# Patient Record
Sex: FEMALE | Race: WHITE | Employment: OTHER | URBAN - METROPOLITAN AREA
[De-identification: names, ages, dates, MRNs, and addresses within clinical notes are randomized per-mention and may not be internally consistent; named-entity substitution may affect disease eponyms.]

---

## 2017-01-05 PROBLEM — I10 ESSENTIAL HYPERTENSION: Status: ACTIVE | Noted: 2017-01-05

## 2017-06-19 ENCOUNTER — HOSPITAL ENCOUNTER (OUTPATIENT)
Dept: MAMMOGRAPHY | Age: 67
Discharge: HOME | End: 2017-06-19
Attending: FAMILY MEDICINE
Payer: MEDICARE

## 2017-06-19 DIAGNOSIS — Z12.31 VISIT FOR SCREENING MAMMOGRAM: ICD-10-CM

## 2017-06-19 PROCEDURE — 77067 SCR MAMMO BI INCL CAD: CPT

## 2017-07-25 PROBLEM — R05.9 COUGH: Status: ACTIVE | Noted: 2017-07-25

## 2017-08-03 ENCOUNTER — HOSPITAL ENCOUNTER (OUTPATIENT)
Dept: CT IMAGING | Age: 67
Discharge: HOME | End: 2017-08-03
Attending: INTERNAL MEDICINE
Payer: MEDICARE

## 2017-08-03 DIAGNOSIS — J84.9 ILD (INTERSTITIAL LUNG DISEASE) (HCC): ICD-10-CM

## 2017-08-03 PROCEDURE — 71250 CT THORAX DX C-: CPT

## 2017-08-08 NOTE — PROGRESS NOTES
Spoke with the patient in regards to their CT Chest wo Cont results. Explained to the patient that per Dr. Felix Mccartney the scan shows signs of ILD and for our next steps he would like to set the patient up with CPFT's and blood work done and review the results with the patient once they are received. The patient was fine with this and did not have any further questions or concerns. CPFT will be scheduled and orders for the lab work have been ordered. // Emily BROWN

## 2017-08-15 ENCOUNTER — HOSPITAL ENCOUNTER (OUTPATIENT)
Dept: LAB | Age: 67
Discharge: HOME | End: 2017-08-15
Payer: MEDICARE

## 2017-08-15 DIAGNOSIS — J84.9 ILD (INTERSTITIAL LUNG DISEASE) (HCC): ICD-10-CM

## 2017-08-15 LAB
CRP SERPL-MCNC: <0.3 MG/DL (ref 0–0.9)
ERYTHROCYTE [SEDIMENTATION RATE] IN BLOOD: 17 MM/HR (ref 0–30)

## 2017-08-15 PROCEDURE — 86200 CCP ANTIBODY: CPT | Performed by: INTERNAL MEDICINE

## 2017-08-15 PROCEDURE — 86430 RHEUMATOID FACTOR TEST QUAL: CPT | Performed by: INTERNAL MEDICINE

## 2017-08-15 PROCEDURE — 85652 RBC SED RATE AUTOMATED: CPT | Performed by: INTERNAL MEDICINE

## 2017-08-15 PROCEDURE — 86038 ANTINUCLEAR ANTIBODIES: CPT | Performed by: INTERNAL MEDICINE

## 2017-08-15 PROCEDURE — 36415 COLL VENOUS BLD VENIPUNCTURE: CPT | Performed by: INTERNAL MEDICINE

## 2017-08-15 PROCEDURE — 86431 RHEUMATOID FACTOR QUANT: CPT | Performed by: INTERNAL MEDICINE

## 2017-08-15 PROCEDURE — 86140 C-REACTIVE PROTEIN: CPT | Performed by: INTERNAL MEDICINE

## 2017-08-15 PROCEDURE — 86225 DNA ANTIBODY NATIVE: CPT | Performed by: INTERNAL MEDICINE

## 2017-08-16 LAB
ANA SER QL: POSITIVE
CCP IGA+IGG SERPL IA-ACNC: 5 UNITS (ref 0–19)
CENTROMERE B AB SER-ACNC: 1.5 AI (ref 0–0.9)
CHROMATIN AB SERPL-ACNC: 2 AI (ref 0–0.9)
DSDNA AB SER-ACNC: 14 IU/ML (ref 0–9)
ENA JO1 AB SER-ACNC: <0.2 AI (ref 0–0.9)
ENA RNP AB SER-ACNC: 0.2 AI (ref 0–0.9)
ENA SCL70 AB SER-ACNC: 0.8 AI (ref 0–0.9)
ENA SM AB SER-ACNC: >8 AI (ref 0–0.9)
ENA SS-A AB SER-ACNC: <0.2 AI (ref 0–0.9)
ENA SS-B AB SER-ACNC: <0.2 AI (ref 0–0.9)
RHEUMATOID FACT SER QL LA: POSITIVE
RHEUMATOID FACT TITR SER LA: NORMAL {TITER}
SEE BELOW, 164869: ABNORMAL

## 2017-08-17 NOTE — PROGRESS NOTES
Patient has been notified of their blood work results. I explained to the patient that per Dr. Abad Orta the results showed suggestive connective tissue disease such as lupus and he would like to refer to the patient to Rheumatology to see if they concur. The patient was fine with this and did not have any further questions or concerns at this time. Referral to Rheumatology has been ordered. // Brandt BROWN

## 2017-09-18 PROBLEM — M32.9 SLE (SYSTEMIC LUPUS ERYTHEMATOSUS) (HCC): Chronic | Status: ACTIVE | Noted: 2017-09-18

## 2017-09-18 PROBLEM — M35.9 INTERSTITIAL LUNG DISEASE DUE TO CONNECTIVE TISSUE DISEASE (HCC): Chronic | Status: ACTIVE | Noted: 2017-09-18

## 2017-09-18 PROBLEM — J84.89 INTERSTITIAL LUNG DISEASE DUE TO CONNECTIVE TISSUE DISEASE (HCC): Chronic | Status: ACTIVE | Noted: 2017-09-18

## 2018-01-10 PROBLEM — M32.8 OTHER FORMS OF SYSTEMIC LUPUS ERYTHEMATOSUS (HCC): Status: ACTIVE | Noted: 2017-09-18

## 2018-01-23 ENCOUNTER — PATIENT OUTREACH (OUTPATIENT)
Dept: CASE MANAGEMENT | Age: 68
End: 2018-01-23

## 2018-01-23 NOTE — PROGRESS NOTES
Patient categorized as high risk patient, level 3/4. Chart reviewed to determine need for CCM services. Patient has had no admissions or ED visits in 3 years. Patient sees several specialists and has established care with Dr. Vidal Gracia office. Patient is self-directed in care as she is in communication with care team outside of office visits. Patient's chronic conditions are managed. At this time there is no evidence to suggest that CCM services are needed. This note will not be viewable in [address]

## 2018-01-25 PROBLEM — H10.30 ACUTE BACTERIAL CONJUNCTIVITIS: Status: ACTIVE | Noted: 2018-01-25

## 2018-01-25 PROBLEM — J02.0 STREP PHARYNGITIS: Status: ACTIVE | Noted: 2018-01-25

## 2018-01-25 PROBLEM — J06.9 ACUTE URI: Status: ACTIVE | Noted: 2018-01-25

## 2018-03-09 PROBLEM — I10 ESSENTIAL HYPERTENSION: Chronic | Status: ACTIVE | Noted: 2017-01-05

## 2018-03-09 PROBLEM — R05.9 COUGH: Status: RESOLVED | Noted: 2017-07-25 | Resolved: 2018-03-09

## 2018-03-09 PROBLEM — M32.8 OTHER FORMS OF SYSTEMIC LUPUS ERYTHEMATOSUS (HCC): Chronic | Status: ACTIVE | Noted: 2017-09-18

## 2018-04-18 PROBLEM — J06.9 ACUTE URI: Status: RESOLVED | Noted: 2018-01-25 | Resolved: 2018-04-18

## 2018-04-18 PROBLEM — H10.30 ACUTE BACTERIAL CONJUNCTIVITIS: Status: RESOLVED | Noted: 2018-01-25 | Resolved: 2018-04-18

## 2018-04-18 PROBLEM — J02.0 STREP PHARYNGITIS: Status: RESOLVED | Noted: 2018-01-25 | Resolved: 2018-04-18

## 2018-04-20 PROBLEM — N95.1 MENOPAUSE SYNDROME: Status: ACTIVE | Noted: 2018-04-20

## 2018-05-07 PROBLEM — I73.00 RAYNAUD'S PHENOMENON WITHOUT GANGRENE: Status: ACTIVE | Noted: 2018-05-07

## 2018-05-07 PROBLEM — R00.2 PALPITATIONS: Status: ACTIVE | Noted: 2018-05-07

## 2018-06-29 ENCOUNTER — HOSPITAL ENCOUNTER (OUTPATIENT)
Dept: MAMMOGRAPHY | Age: 68
Discharge: HOME | End: 2018-06-29
Attending: FAMILY MEDICINE
Payer: MEDICARE

## 2018-06-29 DIAGNOSIS — Z12.31 VISIT FOR SCREENING MAMMOGRAM: ICD-10-CM

## 2018-06-29 PROCEDURE — 77067 SCR MAMMO BI INCL CAD: CPT

## 2018-09-06 ENCOUNTER — HOSPITAL ENCOUNTER (OUTPATIENT)
Dept: GENERAL RADIOLOGY | Age: 68
Discharge: HOME | End: 2018-09-06
Attending: INTERNAL MEDICINE
Payer: MEDICARE

## 2018-09-06 DIAGNOSIS — J84.9 ILD (INTERSTITIAL LUNG DISEASE) (HCC): ICD-10-CM

## 2018-09-06 PROCEDURE — 71046 X-RAY EXAM CHEST 2 VIEWS: CPT

## 2018-12-19 PROBLEM — E78.00 HYPERCHOLESTEROLEMIA: Status: ACTIVE | Noted: 2018-12-19

## 2019-02-21 PROBLEM — R05.9 COUGH: Status: ACTIVE | Noted: 2019-02-21

## 2019-03-22 PROBLEM — J06.9 ACUTE URI: Status: RESOLVED | Noted: 2018-01-25 | Resolved: 2019-03-22

## 2019-07-03 ENCOUNTER — HOSPITAL ENCOUNTER (OUTPATIENT)
Dept: MAMMOGRAPHY | Age: 69
Discharge: HOME | End: 2019-07-03
Attending: FAMILY MEDICINE
Payer: MEDICARE

## 2019-07-03 DIAGNOSIS — Z12.31 VISIT FOR SCREENING MAMMOGRAM: ICD-10-CM

## 2019-07-03 PROCEDURE — 77067 SCR MAMMO BI INCL CAD: CPT

## 2019-10-31 ENCOUNTER — HOSPITAL ENCOUNTER (OUTPATIENT)
Dept: PHYSICAL THERAPY | Age: 69
Discharge: HOME | End: 2019-10-31
Payer: MEDICARE

## 2019-10-31 PROCEDURE — 97110 THERAPEUTIC EXERCISES: CPT

## 2019-10-31 PROCEDURE — 97162 PT EVAL MOD COMPLEX 30 MIN: CPT

## 2019-10-31 NOTE — PROGRESS NOTES
Omid Gross  : 1950  [place] / [place] / Product Type: Medicare /  [address] at [facility] [place]  [address] [place] [address], [address], [address] [facility]  [phone]   [phone]       OUTPATIENT PHYSICAL THERAPY: Daily Treatment Note 10/31/2019  Visit Count: 1     ICD-10: Treatment Diagnosis: Pain in right hip (M25.551); Stiffness of right hip, not elsewhere classified (M25.651); Sciatica, right side (M54.31)  Precautions/Allergies: From EMR: Sulfa (sulfonamide antibiotics) and Penicillins   TREATMENT PLAN:  Effective Dates: 10/31/2019 TO 2019 (60 days). Frequency/Duration: 1-2 time a week for 60 Day(s) MEDICAL/REFERRING DIAGNOSIS:Sciatica, right side [M54.31]   DATE OF ONSET: 1 1/2 years ago  REFERRING PHYSICIAN: Santino Paz MD MD Orders: Willy Radford and Treat  Return MD Appointment: 2020       Pre-treatment Symptoms/Complaints:  See evaluation note from today. Pain: Initial:    0/10 now, 9/10 at worst Post Session:  0/10   Medications Last Reviewed:  10/31/19    Updated Objective Findings:   See evaluation note from today     TREATMENT:     Therapeutic Exercise: (15 Mintues): Instruction and performance in lumbopelvic and hip mobility exercises, including lumbar extension in standing; pelvic tilts, hip pull back/pelvic shifts, lower trunk rotation, and bent knee fall outs in hook-lying; active hamstrings stretch in supine 90/90, and quadruped respiratory belly lift. Good return demonstration with initial training. HEP: Provided written HEP for the above exercises. She can use heat as needed. Advised to continue to modify activity for symptom management. She verbalizes understanding. [employer] Portal [id number]    Treatment/Session Summary:    · Response to Treatment:  Good start to joint mobility exercises with out immediate symptom increase. Symptoms with low irritability noted today an no significant pain reported with thesee exercises. Receptive to the HEP provided today. · Communication/Consultation:  None today  · Equipment provided today:  None today  · Recommendations/Intent for next treatment session: Continue with manual treatment for lumbar and hip joint stiffness; address flexibility and lower quarter muscle balance issues; review HEP and update as appropriate; and use modalities as needed for pain modulation.      Total Treatment Billable Duration: 15 mintes  PT Patient Time In/Time Out  Time In: 0900  Time Out: 1200 [facility], PT    Future Appointments   Date Time Provider Ron Thurman   11/7/2019  8:15 AM Yennifer Sen, PT [facility]   11/14/2019  9:00 AM Gatito Sessions, PT [facility]   11/21/2019 10:30 AM [facility]   11/27/2019  9:45 AM Gatito Sessions, [facility]   12/17/2019 10:00 AM Margie Shone, MD [facility]

## 2019-10-31 NOTE — THERAPY EVALUATION
Easton Hernandez  : 1950  [place]  Secondary: [employer] Garcia Senior Medicare [address] at [facility] [place]  [address] [place] [address], [address], [place] [address].  [phone]   [phone]       OUTPATIENT PHYSICAL THERAPY:Initial Assessment 10/31/2019     ICD-10: Treatment Diagnosis: Pain in right hip (M25.551); Stiffness of right hip, not elsewhere classified (M25.651); Sciatica, right side (M54.31)  Precautions/Allergies: From EMR: Sulfa (sulfonamide antibiotics) and Penicillins   TREATMENT PLAN:  Effective Dates: 10/31/2019 TO 2019 (60 days). Frequency/Duration: 1-2 time a week for 60 Day(s) MEDICAL/REFERRING DIAGNOSIS:Sciatica, right side [M54.31]   DATE OF ONSET: 1 1/2 years ago  REFERRING PHYSICIAN: Natalia Esquivel MD MD Orders: Alba Armijo and Treat  Return MD Appointment: 2020     INITIAL ASSESSMENT:  Ms. Treva Moralez presents the complaint of R lumbosacral, hip, and LE pain for the past 1 1/2 years that appears to be worsening recently. She has a history of s/p L4-5 laminectomy in  with good resolve of symptoms until this pain episode onset. Lumbar AROM is in a capsular pattern with mild joint sign on extension and R side bend. Straight Leg Raise, Slump, and lumbar quadrant tests are negative for symptom reproduction. The R hip presents with positive joint sign. FABERE, FADIR, Kee and Korey tests are all positive on the R. And there is decreased rotation ROM, especially IR. The hip may be contributing to her pain problem as well. The current impairments of pain, stiffness and muscle balance issues to the lumbopelvic and hip region on the R are limiting her comfort and ability to do basic mobility and her normal activities. She will benefit from PT to address these impairments. PROBLEM LIST (Impacting functional limitations):  1. Pain R lumbosacral spine, hip, LE  2. Stiffness lumbar spine, R hip  3.  Functional weakness LQ INTERVENTIONS PLANNED: (Treatment may consist of any combination of the following)  1. Manual Therapies, thermal and electric modalities for pain  2. Manual Therapies, thermal and electric modalities, therapeutic exercise for stiffness  3. Therapeutic Exercise, Therapeutic Activity for weakness and back care functional mobility training     GOALS: (Goals have been discussed and agreed upon with patient.)  Discharge Goals: Time Frame: 8 weeks  1. Report no more than 4/10 intermittent pain with normal activities, bending, lifting, and housework. 2. R hip PROM is grossly equal L and Kee test negative on R for improved mobility comfort. 3. Score at least 55/80 on the LEFS and less than 25% on the Modified Oswestry for improved perceived ability to perform activities. 4. Independent with back care body mechanics with basic ADL's, and a back and hip HEP for continued self-management. OUTCOME MEASURE:   Tool Used: Lower Extremity Functional Scale (LEFS)  Score:  Initial: 39/80 Most Recent: X/80 (Date: -- )   Interpretation of Score: 20 questions each scored on a 5 point scale with 0 representing \"extreme difficulty or unable to perform\" and 4 representing \"no difficulty\". The lower the score, the greater the functional disability. 80/80 represents no disability. Minimal detectable change is 9 points. Tool Used: Modified Oswestry Low Back Pain Questionnaire  Score:  Initial: 20/50 or 40% disabilty Most Recent: X/50 (Date: -- )   Interpretation of Score: Each section is scored on a 0-5 scale, 5 representing the greatest disability. The scores of each section are added together for a total score of 50. MEDICAL NECESSITY:   · Current pain epsidode limiting basic mobilty, normal activities. · Pain, stiffness, weakness to R lumbopelvic and hip region  REASON FOR SERVICES/OTHER COMMENTS:  · Current pain episode limiting basic mobilty and normal activity level.   Total Duration:  PT Patient Time In/Time Out  Time In: 0900  Time Out: 0950    Rehabilitation Potential For Stated Goals: Good, but may be limited by extent of the lumbar spine and R hip joints  Regarding Thierry Hopping therapy, I certify that the treatment plan above will be carried out by a therapist or under their direction. Thank you for this referral,    Dick Gonzales, PT, MSPT, OCS     Referring Physician Signature: Cody Mauro MD _______________________________ Date _____________       PAIN/SUBJECTIVE:   Initial:   0/10 now, 9/10 at worst Post Session:  0/10   HISTORY:   History of Injury/Illness (Reason for Referral): Pain starting about 1 1/2 years ago the next day after doing a lot of house cleaning. Pain has been intermittent, but persisted and has been worse more recently. Pain is located to central and R lower back, posterior hip, lateral hip and thigh, and lateral calf. Denies numbness and tingling. Pain is intermittent. It is worse first thing thing in the morning, and with prolonged standing, walking, sitting/driving, and vacuuming. Pressing on R thigh muscles helps relieve pain. Has been treating with chiro treatment every other week now; some stretching/exercise; home traction unit. No diagnostic imagine done this episode. Was prescribed a prednisone dose pack to start 11/5/19. Goal is to walk without pain; no pain in R thigh. Past Medical History/Comorbidities: 11 year history of back pain; s/p L4-5 laminectomy 5/2009. Ms. Jesi Lee  has a past medical history of ADD (attention deficit disorder), Arthritis, Depression, Essential hypertension (1/5/2017), Interstitial lung disease due to connective tissue disease ([facility] [address] Menopause, Mitral valve prolapse, Sciatic nerve disease, right, SLE (systemic lupus erythematosus) ([facility] [address] (9/18/2017), Unspecified adverse effect of anesthesia, and Unspecified sleep apnea. Ms. Jesi Lee  has a past surgical history that includes hx gyn; hx other surgical; hx lumbar laminectomy (2009); and hx colonoscopy (08/20/2013).   Social History/Living Environment: Lives with her  in a single-story home. Prior Level of Function/Work/Activity: Independent with all basic mobility. Pain limits activities. She is retired. Active with household activities. Ambulatory/Rehab Services H2 Model Falls Risk Assessment   Risk Factors:       No Risk Factors Identified Ability to Rise from Chair:       (1)  Pushes up, successful in one attempt   Falls Prevention Plan:       No modifications necessary   Total: (5 or greater = High Risk): 1   ©2010 [facility] [employer]. All Rights Reserved. [facility] [employer] Patent #[id number]. Federal Law prohibits the replication, distribution or use without written permission from [facility] [employer]   Current Medications: From EMR:      Current Outpatient Medications:     hydroxychloroquine (PLAQUENIL) 200 mg tablet, Take 1 pill once a day after food. , Disp: 30 Tab, Rfl: 3    amLODIPine (NORVASC) 5 mg tablet, Take 1 Tab by mouth daily. , Disp: 90 Tab, Rfl: 1    amitriptyline (ELAVIL) 10 mg tablet, Take 1 Tab by mouth daily. , Disp: 90 Tab, Rfl: 3    sertraline (ZOLOFT) 100 mg tablet, Take 1 Tab by mouth daily. , Disp: 90 Tab, Rfl: 1    rosuvastatin (CRESTOR) 10 mg tablet, Take 1 Tab by mouth nightly., Disp: 90 Tab, Rfl: 1    omeprazole (PRILOSEC) 20 mg capsule, Take 1 Cap by mouth two (2) times a day., Disp: 180 Cap, Rfl: 1    dextroamphetamine-amphetamine (ADDERALL) 30 mg tablet, Take 1 Tab by mouth dailyEarliest Fill Date: 2/19/19. Max Daily Amount: 1 Tab, Disp: 30 Tab, Rfl: 0    dextroamphetamine-amphetamine (ADDERALL) 30 mg tablet, Take 1 Tab by mouth dailyEarliest Fill Date: 1/19/19. Max Daily Amount: 1 Tab, Disp: 30 Tab, Rfl: 0    dextroamphetamine-amphetamine (ADDERALL) 30 mg tablet, Take 1 Tab by mouth dailyEarliest Fill Date: 12/19/18.   Max Daily Amount: 1 Tab, Disp: 30 Tab, Rfl: 0    diclofenac EC (VOLTAREN) 75 mg EC tablet, Take 1 Tab by mouth two (2) times a day., Disp: 60 Tab, Rfl: 5   acyclovir (ZOVIRAX) 400 mg tablet, Take 1 Tab by mouth three (3) times daily. , Disp: 90 Tab, Rfl: 11    alclometasone (ACLOVATE) 0.05 % topical cream, Apply  to affected area two (2) times a day. apply thin layer as directed, Disp: 15 g, Rfl: 0    nystatin (MYCOSTATIN) topical cream, Apply  to affected area two (2) times a day., Disp: 15 g, Rfl: 11    hydrocortisone (CORTAID) 1 % topical cream, Apply  to affected area two (2) times a day. use thin layer, Disp: 30 g, Rfl: 11    conjugated estrogens (PREMARIN) 0.625 mg/gram vaginal cream, Insert 0.5 g into vagina two (2) days a week., Disp: 1 Each, Rfl: 11    ferrous sulfate (IRON) 325 mg (65 mg iron) tablet, Take  by mouth Daily (before breakfast). , Disp: , Rfl:     aspirin delayed-release 81 mg tablet, Take  by mouth daily. , Disp: , Rfl:     cholecalciferol (VITAMIN D3) 400 unit tab tablet, Take  by mouth daily. , Disp: , Rfl:     b complex vitamins (B COMPLEX 1) tablet, Take 1 Tab by mouth daily. , Disp: , Rfl:     MV,[place],MIN/IRON/FOLIC ACID/LUT (COMPLETE MULTI PO), Take  by mouth., Disp: , Rfl:     cpap machine kit, by Does Not Apply route., Disp: , Rfl:     acetaminophen (TYLENOL) 325 mg tablet, Take  by mouth every four (4) hours as needed for Pain., Disp: , Rfl:     CYANOCOBALAMIN (VITAMIN B-12 PO), take 1 Tab by mouth daily. Vitamin b 12 complex , Disp: , Rfl:     CALCIUM CARBONATE/VITAMIN D3 (CALCIUM 600 + D PO), take 1 Tab by mouth daily. , Disp: , Rfl:     ASCORBIC ACID (VITAMIN C PO), take 400 mg by mouth daily. , Disp: , Rfl:     DOCOSAHEXANOIC ACID/EPA (FISH OIL PO), Take 1,200 mg by mouth daily. , Disp: , Rfl:     vitamin E (AQUA GEMS) 400 unit capsule, take 1 Cap by mouth daily. , Disp: , Rfl:    Date Last Reviewed: 10/31/19   Number of Personal Factors/Comorbidities that affect the Plan of Care: 1-2: MODERATE COMPLEXITY   EXAMINATION:     Observation/Orthostatic Postural Assessment: Walks into clinic without significant distress or limp. Standing alignment: R iliac crest appears low with trunk in R side bend. Supine alignment: R LE appears short, R ASIS mildly high vs L. Prone alignment: R LE appears short, PSIS high vs L. .    ROM:  Trunk AROM: Flexion=full; Extension=50% pain; Side Bend L=75%; Side Bend R=50%; Rotation L=75%; Rotation L=75%  PROM L Hip ER= 25; IR=30 deg  PROM R Hip ER=20; IR=15 deg  Strength:  L Hip: grossly 5/5 throughout  R Hip: 4 to abd, IR, 4+ to ext, ER  Special Tests: SLR and Slump tests: negative, Prone Knee Bend test: negative. Lumbar Spine quadrant test: negative. FABERE: positive pain and stiffness R, mild stiffness L; FADIR: positive pain, stiffness R. Modified Kee: positive on R; Korey: positive R>L. Hamstring length: L and R >60 deg. Neurological Screen: Motor and light touch sensation intact to LE's. Functional Mobility: Sit to/from Stand: independent. Bed Mobility: independent, mildly slow. Walking on level ground: mildly stiff trunk/pelvis with mild glut med lurch pattern on R stance. Balance:  Static Balance in Tandem stance firm surface/eyes open L and R >30\" good control; Single-leg stand: firm surface/eyes open L>10\" good control, R<10\" fair control. Body Structures Involved:  4. Nerves  5. Joints  6. Muscles Body Functions Affected:  4. Sensory/Pain  5. Neuromusculoskeletal  6. Movement Related Activities and Participation Affected:  1. General Tasks and Demands  2.  Mobility   Number of elements (examined above) that affect the Plan of Care: 4+: HIGH COMPLEXITY   CLINICAL PRESENTATION:   Presentation: Evolving clinical presentation with changing clinical characteristics: MODERATE COMPLEXITY   CLINICAL DECISION MAKING:   Use of outcome tool(s) and clinical judgement create a POC that gives a: Questionable prediction of patient's progress: MODERATE COMPLEXITY

## 2019-11-07 ENCOUNTER — HOSPITAL ENCOUNTER (OUTPATIENT)
Dept: PHYSICAL THERAPY | Age: 69
Discharge: HOME | End: 2019-11-07
Payer: MEDICARE

## 2019-11-07 PROCEDURE — 97110 THERAPEUTIC EXERCISES: CPT

## 2019-11-07 NOTE — PROGRESS NOTES
Jarad Root  : 1950  [place] / Product Type: Medicare /  [address]  at [facility] [place]  [address] [place] [address], [address], [place] [address].  [phone]   [phone]       OUTPATIENT PHYSICAL THERAPY: Daily Treatment Note 2019  Visit Count: 2     ICD-10: Treatment Diagnosis: Pain in right hip (M25.551); Stiffness of right hip, not elsewhere classified (M25.651); Sciatica, right side (M54.31)  Precautions/Allergies: From EMR: Sulfa (sulfonamide antibiotics) and Penicillins   TREATMENT PLAN:  Effective Dates: 10/31/2019 TO 2019 (60 days). Frequency/Duration: 1-2 time a week for 60 Day(s) MEDICAL/REFERRING DIAGNOSIS:Sciatica, right side [M54.31]   DATE OF ONSET: 1 1/2 years ago  REFERRING PHYSICIAN: Ling Sparrow MD MD Orders: Helena Copeland and Treat  Return MD Appointment: 2020       Pre-treatment Symptoms/Complaints:  Reports compliance with HEP. Pain is a little better. She started on prednisone yesterday for 5 days. She has also been going to a chiropractor for several months. Pain: Initial: Pain Intensity 1: 0    Post Session:  None   Medications Last Reviewed:  Prednisone started 19    Updated Objective Findings:   See evaluation note from today     TREATMENT:     Therapeutic Exercise: (30 Minutes):  Exercises per grid below to improve mobility and strength. Required moderate visual and verbal cues to ensure correct performance. Progressed complexity of movement as indicated.      Date:  19 Date:   Date:     Activity/Exercise Parameters Parameters Parameters   Standing lumbar extn 5 sec x 10     Supine pelvic tilt 5 sec x10     LTR x10     Bent knee fall outs x10     90/90 hamstring stretch 5 sec 1 x 10 ea     Quadruped belly lift 1x10     Trunk flex with ball 5 sec 2x10     Trunk diagonals with ball 1 x 10 ea     Hip adductor squeeze 2x10     Hip ER with band Red 2x10     SKTC B 1x10     bridging 2x10 HEP: continue current HEP  [facility] Portal [id number]    Treatment/Session Summary:    · Response to Treatment:  No increased pain with exercises and notes that she had less pain when she first got out of bed today. Seems to be progressing. · Communication/Consultation:  None today  · Equipment provided today:  None today  · Recommendations/Intent for next treatment session: continue with exercises. Manual therapy and modals as needed.       Total Treatment Billable Duration: 30 mintes  PT Patient Time In/Time Out  Time In: 0226  Time Out: 455 [facility] Ayesha Colmenares PT

## 2019-11-14 ENCOUNTER — HOSPITAL ENCOUNTER (OUTPATIENT)
Dept: PHYSICAL THERAPY | Age: 69
Discharge: HOME | End: 2019-11-14
Payer: MEDICARE

## 2019-11-14 PROCEDURE — 97110 THERAPEUTIC EXERCISES: CPT

## 2019-11-14 PROCEDURE — 97140 MANUAL THERAPY 1/> REGIONS: CPT

## 2019-11-14 NOTE — PROGRESS NOTES
Phillip Esteves  : 1950  [place] / Product Type: Medicare /  [address]  at [facility] [place]  [address] [facility], [address], [place] [address].  [phone]   [phone]       OUTPATIENT PHYSICAL THERAPY: Daily Treatment Note 2019  Visit Count: 3     ICD-10: Treatment Diagnosis: Pain in right hip (M25.551); Stiffness of right hip, not elsewhere classified (M25.651); Sciatica, right side (M54.31)  Precautions/Allergies: From EMR: Sulfa (sulfonamide antibiotics) and Penicillins   TREATMENT PLAN:  Effective Dates: 10/31/2019 TO 2019 (60 days). Frequency/Duration: 1-2 time a week for 60 Day(s) MEDICAL/REFERRING DIAGNOSIS:Sciatica, right side [M54.31]   DATE OF ONSET: 1 1/2 years ago  REFERRING PHYSICIAN: Christina Olivas MD MD Orders: Mirian Lewis and Treat  Return MD Appointment: 2020       Pre-treatment Symptoms/Complaints:  Says she was very painful to the lateral hip/thigh all day on . Could not walk on it. 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 A little better at the beginning of the week. Had chiro treatment on Tuesday. Hurt all day yesterday. Sore this morning with walking. Finished prednisone dose pack on the . Dr. Rochelle Pelletier is scheduling an MRI for her, then she will follow up with him. Pain: Initial:   0/10 at rest, 6-7/10 with walking Post Session:  0/10   Medications Last Reviewed: diclofenac 2x/day 19    Updated Objective Findings:   Observation: R LE short/ASIS high in supine. ROM: R Hip PROM and AROM stiff to IR>ER. Strength: Pain weakness to R hip abd. Special Tests: FABERE and FADIR:positive stiffness and pain reproduction. SLR, PKB negative. TREATMENT:     Manual Therapies: (15 Minutes): Muscle Energy Technique sequence for posterior rotated R innominate with reduction of leg length discrepancy.  Hip joint mobilizations for stiffness and pain with long axis traction, grade 3, distraction grade 3 to 4, physiologic hip quadrant grade 4- - to 4-, 3x30\" each. Positional Release to R piriformis for muscle restriction. And Mulligrabiel's PRP to R hip in FABERE position for stiffness and pain, 3x10. Therapeutic Exercise: (27 Mintues): Assisted Active Isolated Stretching to R posterior hip, hip adductors, lateral hip, hamstrings in 90/90 and SLR, hip flexors and quads in side-lying, and quads in prone, 3\"x10 each; reciprocal inhibition stretch to hip flexors in side-lying 10\"x6. Lumbopelvic muscle activation with myokinematic hip add pull back with IR in side-lying 10\"x5; bent knee hip abd/ER in side-lying 10\"x5; and standing back to wall hp shift/pull back L and Rnstruction in hip abd/ER and standing hip shift for HEP. Reviewed performance of initial HEP. Appears to have good understanding. HEP:Verbal instructioin for new HEP above. She is to continue with initial HEP as well. She verbalizes understanding. [employer] Portal [id number]    Treatment/Session Summary:    · Response to Treatment: Continued R lateral hip/thigh pain primarily with weight bearing activities. Still presents with positive hip joint signs. · Communication/Consultation:  None today  · Equipment provided today:  None today  · Recommendations/Intent for next treatment session: She has conflicts next week, so will follow up the following week. She is to work on her HEP. We will continue with manual treatment for lumbar and hip joint stiffness; address flexibility and lower quarter muscle balance issues; review HEP and update as appropriate; and use modalities as needed for pain modulation.      Total Treatment Billable Duration: 42 minutes  PT Patient Time In/Time Out  Time In: 0900  Time Out: [address], PT    Future Appointments   Date Time Provider Ron Thurman   11/27/2019  9:45 AM Gatito Waldrop, PT [facility]   12/17/2019 10:00 AM Ingrid Barcenas MD [address] [place] [address]

## 2019-11-21 ENCOUNTER — APPOINTMENT (OUTPATIENT)
Dept: PHYSICAL THERAPY | Age: 69
End: 2019-11-21
Payer: MEDICARE

## 2019-11-27 ENCOUNTER — HOSPITAL ENCOUNTER (OUTPATIENT)
Dept: PHYSICAL THERAPY | Age: 69
Discharge: HOME | End: 2019-11-27
Payer: MEDICARE

## 2019-11-27 PROCEDURE — 97110 THERAPEUTIC EXERCISES: CPT

## 2019-11-27 NOTE — PROGRESS NOTES
Halle Tejeda  : 1950  [place] / Product Type: Medicare /  [address] [facility]  at [facility] [place] [address]  [address] [facility], [address], [place] [address].  [phone]   [phone]       OUTPATIENT PHYSICAL THERAPY: Daily Treatment Note 2019  Visit Count: 4     ICD-10: Treatment Diagnosis: Pain in right hip (M25.551); Stiffness of right hip, not elsewhere classified (M25.651); Sciatica, right side (M54.31)  Precautions/Allergies: From EMR: Sulfa (sulfonamide antibiotics) and Penicillins   TREATMENT PLAN:  Effective Dates: 10/31/2019 TO 2019 (60 days). Frequency/Duration: 1-2 time a week for 60 Day(s) MEDICAL/REFERRING DIAGNOSIS:Sciatica, right side [M54.31]   DATE OF ONSET: 1 1/2 years ago  REFERRING PHYSICIAN: Michael Martin MD MD Orders: Irasema Calzada and Treat  Return MD Appointment: 2020     Reports having a consult with Dr. Author Tian last week. MRI done on her back show pathology to entire lumbar spine. Also, did x-rays of the R hip showing marked DJD to the hip joint. He referred her to Dr. Rachelle Soria for [place] [address] consult. Pre-treatment Symptoms/Complaints:  Says her hip is a little better today. Did a lot of cooking yesterday, so on her feet a lot. Has been doing her HEP. Would like written HEP for the exercises. Pain: Initial:   0/10 at rest, 6-7/10 with walking Post Session:  0/10   Medications Last Reviewed: diclofenac 2x/day 19    Updated Objective Findings:   Observation: R LE short/ASIS high in supine. ROM: R Hip PROM and AROM stiff to IR>ER. Strength: Pain weakness to R hip abd. Special Tests: FABERE and FADIR:positive stiffness and pain reproduction. SLR, PKB negative. TREATMENT:     Manual Therapies: (5 Minutes): Hip joint mobilizations for stiffness and pain with long axis traction, grade 3, and distraction grade 3 to 4x30\" each.   Therapeutic Exercise: (36 Mintues): Assisted Active Isolated Stretching to R posterior hip, hip adductors, lateral hip, hamstrings in 90/90 and SLR, hip flexors and quads in side-lying, and quads in prone, 3\"x10 each; reciprocal inhibition stretch to hip flexors in side-lying 10\"x6. Lumbopelvic and hip muscle activation with myokinematic hip add pull back with IR in side-lying 10\"x5; bent knee hip abd/ER in side-lying 10\"x5; added side-lying straight leg hip abd x10, hook-lying bridges x10 and bridge with hip shift; sitting hip ER and IR with hip shift, red band 1x10 each; sit to stand with hip shift L emphasis. HEP: Provided written instructioin for hip exercises done today. Advised in use of cane on L for unload R hip when more sore and limps on it. She verbalizes understanding. [employer] Portal [id number]    Treatment/Session Summary:    · Response to Treatment: New imaging show degenerative pathology to the lumbar spine and the to the R hip. She is to have GENARO hip consult. Stiff to the R hip joint with joint sign and weakness to hip abductors. Good performance of the exercises today. · Communication/Consultation:  None today  · Equipment provided today:  None today  · Recommendations/Intent for next treatment session:  She is to work on her HEP. We will continue with manual treatment for lumbar and hip joint stiffness; address flexibility and lower quarter muscle balance issues; review HEP and update as appropriate; and use modalities as needed for pain modulation.      Total Treatment Billable Duration: 45 minutes  PT Patient Time In/Time Out  Time In: 9324  Time Out: 129 Patricia Blanco PT    Future Appointments   Date Time Provider Ron Thurman   12/4/2019  9:45 AM Lynnda Opitz, Milbert Fat[facility]   12/12/2019  9:00 [facility]   12/17/2019 10:00 AM Verner Earthly, MD [facility]   12/20/2019  9:00 AM Chris Reynolds, ARNIE DUMONT [facility]

## 2019-12-04 ENCOUNTER — HOSPITAL ENCOUNTER (OUTPATIENT)
Dept: PHYSICAL THERAPY | Age: 69
Discharge: HOME | End: 2019-12-04
Payer: MEDICARE

## 2019-12-04 PROCEDURE — 97110 THERAPEUTIC EXERCISES: CPT

## 2019-12-04 PROCEDURE — 97140 MANUAL THERAPY 1/> REGIONS: CPT

## 2019-12-04 NOTE — PROGRESS NOTES
Binh Maravilla  : 1950  [place] / Product Type: Medicare /  [address]  at [facility] [place]  [facility] [facility], [address], [place] [address].  [phone]   [phone]       OUTPATIENT PHYSICAL THERAPY: Daily Treatment Note 2019  Visit Count: 5     ICD-10: Treatment Diagnosis: Pain in right hip (M25.551); Stiffness of right hip, not elsewhere classified (M25.651); Sciatica, right side (M54.31)  Precautions/Allergies: From EMR: Sulfa (sulfonamide antibiotics) and Penicillins   TREATMENT PLAN:  Effective Dates: 10/31/2019 TO 2019 (60 days). Frequency/Duration: 1-2 time a week for 60 Day(s) MEDICAL/REFERRING DIAGNOSIS:Sciatica, right side [M54.31]   DATE OF ONSET: 1 1/2 years ago  REFERRING PHYSICIAN: Shaji Iyer MD MD Orders: Snehal Robledo and Treat  Return MD Appointment: 2020       Pre-treatment Symptoms/Complaints:  Says she has been doing pretty good with low pain to the R hip region and thigh, but increased pain starting yesterday. Says she did a long shopping trip and errands on Monday afternoon/evening. Pain to top/lateral thigh. Pain when on her feet, and typically when on her feet. Pain over night last night, however. No significant pain to the lower back. Used heat on it yesterday and last night. She is ot have her consult with Dr. Marly Connolly on Monday, then here on Thursday next week. Pain: Initial:   7/10 with weight bearing, 0/10 when off feet Post Session: 0/10 imme   Medications Last Reviewed: diclofenac, tylenol PM 19    Updated Objective Findings:   Observation: R LE short/ASIS high in supine. ROM: R Hip PROM and AROM stiff to IR>ER. Strength: Pain weakness to R hip abd. Special Tests: FABERE and FADIR:positive stiffness and pain reproduction. SLR, PKB negative. TREATMENT:     Therapeutic Modalities: (15 Minutes):  Moist heat to R lateral hip/proximal thigh for thermal effects to soft tissue; in L side-lying, x15'. No adverse skin reaction during or post.    Manual Therapies: (25 Minutes): Hip joint mobilizations for stiffness and pain with long axis traction, grade 3, 3x30-60\"; distraction grade 3 to 4x30\"; physiologic IR with knee extended, abd, and hip quadrant, grade 2 to 4- - to 4- as pain allows, 3-4 x30\" each. Therapeutic Exercise: (15 Mintues): Exercises for active Lumbopelvic and hip mobility with hook-lying pelvic tilts and hip shift pull backs 3x10 each. Assisted Active Isolated Stretching to R posterior hip, hip adductors with knee extended and knee flexed, hamstrings in 90/90 and SLR, hip flexors and quads in side-lying, 3\"x10 each. Reviewed mobility and active stretching HEP, and hip muscle activation/strength HEP for continued use with respect to pain. Demonstrates good understanding. HEP: She is to continue with her existing HEP. She verbalizes understanding. [employer] Portal [id number]    Treatment/Session Summary:    · Response to Treatment: Increased pain reported this morning most likely from activity on her feet on Monday. Stiff and painful to end ranges of hip motion. No significant pain immediately after session. · Communication/Consultation:  None today  · Equipment provided today:  None today  · Recommendations/Intent for next treatment session:  She is to work on her HEP. We will follow up after her hip consult next week and plan to update HEP as needed for hip strength and motion.       Total Treatment Billable Duration: 55 minutes  PT Patient Time In/Time Out  Time In: 4628  Time Out: 3200 [facility], PT    Future Appointments   Date Time Provider Ron Thurman   12/12/2019  9:00 AM Delia Ascencio PT [facility]   12/17/2019 10:00 AM Agustín Silverman MD [facility]   12/20/2019  9:00 AM [facility]

## 2019-12-12 ENCOUNTER — HOSPITAL ENCOUNTER (OUTPATIENT)
Dept: PHYSICAL THERAPY | Age: 69
Discharge: HOME | End: 2019-12-12
Payer: MEDICARE

## 2019-12-12 PROCEDURE — 97110 THERAPEUTIC EXERCISES: CPT

## 2019-12-12 NOTE — PROGRESS NOTES
Cristina Markham  : 1950  [place] / Product Type: Medicare /  [address]  at [facility] [place]  [facility], [address], [place] [address].  [phone]   [phone]       OUTPATIENT PHYSICAL THERAPY: Daily Treatment Note 2019  Visit Count: 6     ICD-10: Treatment Diagnosis: Pain in right hip (M25.551); Stiffness of right hip, not elsewhere classified (M25.651); Sciatica, right side (M54.31)  Precautions/Allergies: From EMR: Sulfa (sulfonamide antibiotics) and Penicillins   TREATMENT PLAN:  Effective Dates: 10/31/2019 TO 2019 (60 days). Frequency/Duration: 1-2 time a week for 60 Day(s) MEDICAL/REFERRING DIAGNOSIS:Sciatica, right side [M54.31]   DATE OF ONSET: 1 1/2 years ago  REFERRING PHYSICIAN: Brandi Royal MD MD Orders: Lodema Blend and Treat  Return MD Appointment: 2020       Reports having her consult with Dr. Andria Kebede. He is recommending GENARO, and she is scheduled in March. Pre-treatment Symptoms/Complaints: Says she was sore in the hip yesterday but not as bad today. Has been doing her HEP, and using her cane when she feels she needs it with walking. Pain primarily with walking. Pain to anterior thigh. Pain: Initial:   7/10 with weight bearing, 0/10 when off feet Post Session: 0/10 imme   Medications Last Reviewed: diclofenac, tylenol PM 19    Updated Objective Findings:   No new measure. TREATMENT:     Manual Therapies: (3 Minutes): Hip joint mobilizations for stiffness and pain with long axis traction between exercises. Therapeutic Exercise: (39 Mintues): Exercises for active Lumbopelvic and hip mobility with hook-lying pelvic tilts and hip shift pull backs x10 each.     Assisted Active Isolated Stretching to R posterior hip, hip adductors with knee extended and knee flexed, hip IR with knee extended, lateral hip, hamstrings in 90/90 and SLR, hip flexors and quads in side-lying, 3\"x10 each; abd with knee bent and IR with knee extended done 3 sets. Instruction and performance in active hip AROM/low level strength including: side-lying bent knee hip pull back/IR, hip abd/ER, straight leg abd; prone knee flexion and hip extension, side-lying hip add; hook-lying bridge and bridge with yan-bridge. Instruction for HEP. Good return demonstration with training. HEP: Provided written HEP for the new exercises. She is to continue with her existing HEP. She verbalizes understanding. [employer] Portal [id number]; [facility]     Treatment/Session Summary:    · Response to Treatment: She is now scheduled to have R GENARO in March. Doing well with her exercises. Instructing for pre-op exercise to prep for the surgery. · Communication/Consultation:  None today  · Equipment provided today:  None today  · Recommendations/Intent for next treatment session:  She is to work on her HEP. We will follow up next week to review and modify HEP as needed and prep for discharge until her surgery.      Total Treatment Billable Duration: 43 minutes  PT Patient Time In/Time Out  Time In: 0900  Time Out: 1200 [facility]     Future Appointments   Date Time Provider Ron Thurman   12/17/2019 10:00 AM Bryn Kidd MD [facility]   12/20/2019  9:00 AM Kirt Mattson PT [facility]   3/24/2020 10:00 AM Say Damon MD [facility]

## 2019-12-20 ENCOUNTER — HOSPITAL ENCOUNTER (OUTPATIENT)
Dept: PHYSICAL THERAPY | Age: 69
Discharge: HOME | End: 2019-12-20
Payer: MEDICARE

## 2019-12-20 PROCEDURE — 97110 THERAPEUTIC EXERCISES: CPT

## 2019-12-20 NOTE — THERAPY DISCHARGE
Luis A Harrisangelist  : 1950  [place]  Secondary: [place] [place] Senior Medicare [address] [facility] at [facility] [place]  [address] [facility], [address] [place] [address],[address], [facility] [address]  [phone]   [phone]       OUTPATIENT PHYSICAL THERAPY:Discharge Summary 2019     ICD-10: Treatment Diagnosis: Pain in right hip (M25.551); Stiffness of right hip, not elsewhere classified (M25.651); Sciatica, right side (M54.31)  Precautions/Allergies: From EMR: Sulfa (sulfonamide antibiotics) and Penicillins   TREATMENT PLAN:  Effective Dates: 10/31/2019 TO 2019 (60 days). Frequency/Duration: 1-2 time a week for 60 Day(s) MEDICAL/REFERRING DIAGNOSIS:Sciatica, right side [M54.31]   DATE OF ONSET: 1 1/2 years ago  REFERRING PHYSICIAN: Laurel Clement MD MD Orders: Manuel Fuchs and Treat  Return MD Appointment: 2020     DISCHARGE ASSESSMENT:  Ms. Yohannes Ramirez attended 7 of 7 scheduled visits since the initial visit on 10/31/19 for her complaint of R thigh pain. She is doing somewhat better. She has been diagnosed with hip degenerative joint disease and is scheduled for a GENARO in March. She continues to have pain into the anterior thigh that is intermittent with weight bearing. There is audible crepitus to the hip. Her hip ROM is WFL's, but limited to IR and extension primarily. She has good strength to the hip and knee at this point. She walks with a mild limp on R stance which is improved with use of a cane. She is independent with her HEP. She made progress toward her discharge goals. She is comfortable with self managing until her surgery. ischarge Goals:   1. Report no more than 4/10 intermittent pain with normal activities, bending, lifting, and housework. Progressed 19  2. R hip PROM is grossly equal L and Kee test negative on R for improved mobility comfort. Progressed 19  3.  Score at least 55/80 on the LEFS and less than 25% on the Modified Oswestry for improved perceived ability to perform activities. No follow up score 12/20/19  4. Independent with back care body mechanics with basic ADL's, and a back and hip HEP for continued self-management. Met 12/20/19    RECOMMENDATION: I will plan to discharge Liza Willis from Physical Therapy at this time. She is to continue with her HEP as instructed. I will be happy to follow up with her if there is a need for Physical Therapy prior to GENARO surgery, as well as post op. Thank you for the opportunity to serve this client.     Clyde Mckenzie, PT, MSPT, OCS

## 2019-12-20 NOTE — PROGRESS NOTES
Alexandria Foley  : 1950  [place] / Product Type: Medicare /  [address] at [facility] [place]  [address] [facility], [address], [place] [address].  [phone]   [phone]       OUTPATIENT PHYSICAL THERAPY: Daily Treatment Note 2019  Visit Count: 7     ICD-10: Treatment Diagnosis: Pain in right hip (M25.551); Stiffness of right hip, not elsewhere classified (M25.651); Sciatica, right side (M54.31)  Precautions/Allergies: From EMR: Sulfa (sulfonamide antibiotics) and Penicillins   TREATMENT PLAN:  Effective Dates: 10/31/2019 TO 2019 (60 days). Frequency/Duration: 1-2 time a week for 60 Day(s) MEDICAL/REFERRING DIAGNOSIS:Sciatica, right side [M54.31]   DATE OF ONSET: 1 1/2 years ago  REFERRING PHYSICIAN: Cody Mauro MD MD Orders: José Miguel Duarte and Treat  Return MD Appointment: 2020        Pre-treatment Symptoms/Complaints: Says she has been doing \"pretty good\". Doing her HEP without issue. Uses the cane with walking when there is more pain. Still gets soreness to the front and side of the thigh. Did some walking activity yesterday and did not have too much pain. She is now on a wait list for hip replacement. Feels comfortable with working on her HEP until then. Pain: Initial:   7/10 with weight bearing, 0/10 when off feet Post Session: 0/10 imme   Medications Last Reviewed: diclofenac, tylenol PM 19    Updated Objective Findings:   No new measure. TREATMENT:     Manual Therapies: (5 Minutes): Hip joint mobilizations for stiffness and pain with long axis traction, hip distraction, and IR physiologic mobilization, grade 2 to 4-, 3x20-30\" each.     Therapeutic Exercise: (36 Mintues): Exercises for hip and LE motion with assisted Active Isolated Stretching to R posterior hip, hip adductors with knee extended and knee flexed, hip IR with knee extended, lateral hip, hamstrings in 90/90 and SLR, hip flexors and quads in side-lying, 3\"x10 each. Reviewed and performed HEP exercises, including  side-lying bent knee hip pull back/IR, hip abd/ER, straight leg abd; prone knee flexion, straight knee hip extension, side-lying hip add, and hook-lying bridge. Good understanding and return demonstration. Added static balance in tandem 20-30\" each, and single-leg attempts. HEP: She is to continue with her existing HEP and to add the static balance. She verbalizes understanding. [employer] Portal [id number]; [facility]     Treatment/Session Summary:    · Response to Treatment: She is scheduled to have R GENARO in March. Doing well with her exercises and comfortable with self-managing until then. · Communication/Consultation:  None today  · Equipment provided today:  None today  · Recommendations/Intent for next treatment session:  She is to work on her HEP. Will discharge from PT until surgery.      Total Treatment Billable Duration: 45 minutes  PT Patient Time In/Time Out  Time In: 5036  Time Out: 1200 [facility]    Future Appointments   Date Time Provider Ron Thurman   3/24/2020 10:00 AM Doug Napier MD [facility]   4/16/2020  9:40 AM Alejandra Bernal MD [address] [facility]

## 2020-01-28 ENCOUNTER — HOSPITAL ENCOUNTER (OUTPATIENT)
Dept: PHYSICAL THERAPY | Age: 70
Discharge: HOME | End: 2020-01-28
Payer: MEDICARE

## 2020-01-28 ENCOUNTER — HOME HEALTH ADMISSION (OUTPATIENT)
Dept: HOME HEALTH SERVICES | Facility: HOME HEALTH | Age: 70
End: 2020-01-28
Payer: MEDICARE

## 2020-01-28 ENCOUNTER — HOSPITAL ENCOUNTER (OUTPATIENT)
Dept: SURGERY | Age: 70
Discharge: HOME | End: 2020-01-28
Payer: MEDICARE

## 2020-01-28 LAB
ANION GAP SERPL CALC-SCNC: 6 MMOL/L (ref 7–16)
APTT PPP: 28.1 SEC (ref 24.7–39.8)
ATRIAL RATE: 91 BPM
BASOPHILS # BLD: 0 K/UL (ref 0–0.2)
BASOPHILS NFR BLD: 1 % (ref 0–2)
BUN SERPL-MCNC: 15 MG/DL (ref 8–23)
CALCIUM SERPL-MCNC: 9.2 MG/DL (ref 8.3–10.4)
CALCULATED P AXIS, ECG09: 49 DEGREES
CALCULATED R AXIS, ECG10: 0 DEGREES
CALCULATED T AXIS, ECG11: 4 DEGREES
CHLORIDE SERPL-SCNC: 106 MMOL/L (ref 98–107)
CO2 SERPL-SCNC: 31 MMOL/L (ref 21–32)
CREAT SERPL-MCNC: 0.79 MG/DL (ref 0.6–1)
DIAGNOSIS, 93000: NORMAL
DIFFERENTIAL METHOD BLD: ABNORMAL
EOSINOPHIL # BLD: 0.2 K/UL (ref 0–0.8)
EOSINOPHIL NFR BLD: 2 % (ref 0.5–7.8)
ERYTHROCYTE [DISTWIDTH] IN BLOOD BY AUTOMATED COUNT: 12.3 % (ref 11.9–14.6)
EST. AVERAGE GLUCOSE BLD GHB EST-MCNC: 108 MG/DL
GLUCOSE SERPL-MCNC: 88 MG/DL (ref 65–100)
HBA1C MFR BLD: 5.4 %
HCT VFR BLD AUTO: 40.5 % (ref 35.8–46.3)
HGB BLD-MCNC: 12.6 G/DL (ref 11.7–15.4)
IMM GRANULOCYTES # BLD AUTO: 0 K/UL (ref 0–0.5)
IMM GRANULOCYTES NFR BLD AUTO: 0 % (ref 0–5)
INR PPP: 0.9
LYMPHOCYTES # BLD: 0.9 K/UL (ref 0.5–4.6)
LYMPHOCYTES NFR BLD: 14 % (ref 13–44)
MCH RBC QN AUTO: 29.2 PG (ref 26.1–32.9)
MCHC RBC AUTO-ENTMCNC: 31.1 G/DL (ref 31.4–35)
MCV RBC AUTO: 94 FL (ref 79.6–97.8)
MONOCYTES # BLD: 0.5 K/UL (ref 0.1–1.3)
MONOCYTES NFR BLD: 8 % (ref 4–12)
NEUTS SEG # BLD: 4.7 K/UL (ref 1.7–8.2)
NEUTS SEG NFR BLD: 74 % (ref 43–78)
NRBC # BLD: 0 K/UL (ref 0–0.2)
P-R INTERVAL, ECG05: 164 MS
PLATELET # BLD AUTO: 186 K/UL (ref 150–450)
PMV BLD AUTO: 11.2 FL (ref 9.4–12.3)
POTASSIUM SERPL-SCNC: 3.4 MMOL/L (ref 3.5–5.1)
PROTHROMBIN TIME: 12.3 SEC (ref 11.7–14.5)
Q-T INTERVAL, ECG07: 362 MS
QRS DURATION, ECG06: 88 MS
QTC CALCULATION (BEZET), ECG08: 445 MS
RBC # BLD AUTO: 4.31 M/UL (ref 4.05–5.2)
SODIUM SERPL-SCNC: 143 MMOL/L (ref 136–145)
VENTRICULAR RATE, ECG03: 91 BPM
WBC # BLD AUTO: 6.4 K/UL (ref 4.3–11.1)

## 2020-01-28 PROCEDURE — 80048 BASIC METABOLIC PNL TOTAL CA: CPT

## 2020-01-28 PROCEDURE — 85730 THROMBOPLASTIN TIME PARTIAL: CPT

## 2020-01-28 PROCEDURE — 85025 COMPLETE CBC W/AUTO DIFF WBC: CPT

## 2020-01-28 PROCEDURE — 85610 PROTHROMBIN TIME: CPT

## 2020-01-28 PROCEDURE — 97161 PT EVAL LOW COMPLEX 20 MIN: CPT

## 2020-01-28 PROCEDURE — 83036 HEMOGLOBIN GLYCOSYLATED A1C: CPT

## 2020-01-28 PROCEDURE — 93005 ELECTROCARDIOGRAM TRACING: CPT | Performed by: ANESTHESIOLOGY

## 2020-01-28 PROCEDURE — 36415 COLL VENOUS BLD VENIPUNCTURE: CPT

## 2020-01-28 PROCEDURE — 77030027138 HC INCENT SPIROMETER -A

## 2020-01-28 PROCEDURE — 87641 MR-STAPH DNA AMP PROBE: CPT

## 2020-01-28 NOTE — PROGRESS NOTES
Yu Eugene  : (82 y.o.) [facility] at [facility]  [address], [place] [address]  [phone]       Physical Therapy Prehab Plan of Treatment and Evaluation Summary:2020    ICD-10: Treatment Diagnosis:   · Pain in Right Hip (M25.551)  · Stiffness of Right Hip, Not elsewhere classified (M25.651)  Precautions/Allergies:   Sulfa (sulfonamide antibiotics) and Penicillins  MEDICAL/REFERRING DIAGNOSIS:  Unilateral primary osteoarthritis, right hip [M16.11]  REFERRING PHYSICIAN: Liliana Santo MD  DATE OF SURGERY: 20    Assessment:   Comments:  She is here alone and plans on going home at [place] with the support of her spouse. She is using a cane for amb now. She should do well after surgery     PROBLEM LIST (Impacting functional limitations):  Ms. Lissa Rodgers presents with the following right lower extremity(s) problems:  1. Strength  2. Range of Motion  3. Home Exercise Program  4. Pain   INTERVENTIONS PLANNED:  1. Home Exercise Program  2. Educational Discussion      TREATMENT PLAN: Effective Dates: 2020 TO 2020. Frequency/Duration: Patient to continue to perform home exercise program at least twice per day up until her surgery. GOALS: (Goals have been discussed and agreed upon with patient.)  Discharge Goals: Time Frame: 1 Day  1. Patient will demonstrate independence with a home exercise program designed to increase strength, range of motion and pain control to minimize functional deficits and optimize patient for total joint replacement. Rehabilitation Potential For Stated Goals: Good  Regarding Maddie rodriguez, I certify that the treatment plan above will be carried out by a therapist or under their direction.   Thank you for this referral,  Wesly Clifford, PT               HISTORY:   Present Symptoms:  Pain Intensity 1: 8(at its worst)  Pain Location 1: Hip, Leg  Pain Orientation 1: Anterior, Lateral, Posterior, Right   History of Present Injury/Illness (Reason for Referral):  Medical/Referring Diagnosis: Unilateral primary osteoarthritis, right hip [M16.11]   Past Medical History/Comorbidities:   Ms. Ryne Segura  has a past medical history of ADD (attention deficit disorder), Arthritis, Depression, Essential hypertension (1/5/2017), Interstitial lung disease due to connective tissue disease ([facility] [address] Menopause, Mitral valve prolapse, Sciatic nerve disease, right, SLE (systemic lupus erythematosus) ([address] (9/18/2017), Unspecified adverse effect of anesthesia, and Unspecified sleep apnea. She also has no past medical history of Arrhythmia, Asthma, CAD (coronary artery disease), Cancer ([address] Chronic kidney disease, Chronic pain, COPD, Diabetes ([address] Difficult intubation, GERD (gastroesophageal reflux disease), Heart failure ([address] Liver disease, Malignant hyperthermia due to anesthesia, Nausea & vomiting, Pseudocholinesterase deficiency, PUD (peptic ulcer disease), Seizures ([address] Stroke ([address] Thromboembolus ([address] or Thyroid disease. Ms. Ryne Segura  has a past surgical history that includes hx gyn; hx other surgical; hx lumbar laminectomy (2009); and hx colonoscopy (08/20/2013).   Social History/Living Environment:   Home Environment: Private residence  # Steps to Enter: 3  Hand Rails : Bilateral  One/Two Story Residence: One story  Living Alone: No  Support Systems: Spouse/Significant Other/Partner  Patient Expects to be Discharged to[de-identified] Private residence  Current DME Used/Available at Home: Casie Pronto, Shower chair, Commode, bedside  Tub or Shower Type: Shower  Work/Activity:  retired  Dominant Side:  RIGHT  Current Medications:  See Pre-assessment nursing note   Number of Personal Factors/Comorbidities that affect the Plan of Care: 1-2: MODERATE COMPLEXITY   EXAMINATION:   ADLs (Current Functional Status):   Ambulation:  [x] Independent  [] Walk Indoors Only  [] Walk Outdoors  [x] Use Assistive Device  [] Use Wheelchair Only Dressing:  [x] Independent  Requires Assistance from Someone for:  [] Sock/Shoes  [] Pants  [] Everything   Bathing/Showering:   [x] Independent  [] Requires Assistance from Someone  [] [address] Only Household Activities:  [] Routine house and yard work  [x] Light Housework Only  [] None   Observation/Orthostatic Postural Assessment:    Leg length discrepancy, right(R LE 1/8\" shorter than L)  ROM/Flexibility:   AROM: Within functional limits(L LE)                       RLE AROM  R Hip Flexion: 120  R Hip ABduction: 25   Strength:   Strength: Generally decreased, functional(L LE 4/5)              RLE Strength  R Hip Flexion: 3+  R Hip ABduction: 3+  R Knee Extension: 4  R Ankle Dorsiflexion: 4   Functional Mobility:    Sensation: Intact(LLE)    Stand to Sit: Independent  Sit to Stand: Independent  Stand Pivot Transfers: Independent  Distance (ft): 300 Feet (ft)  Ambulation - Level of Assistance: Modified independent  Assistive Device: Cane, straight  Speed/Susie: Pace decreased (<100 feet/min)  Step Length: Left shortened  Stance: Right decreased  Gait Abnormalities: Antalgic          Balance:    Sitting: Intact  Standing: Intact   Body Structures Involved:  1. Bones  2. Joints  3. Muscles Body Functions Affected:  1. Neuromusculoskeletal  2. Movement Related Activities and Participation Affected:  1. General Tasks and Demands  2. Mobility   Number of elements that affect the Plan of Care: 4+: HIGH COMPLEXITY   CLINICAL PRESENTATION:   Presentation: Stable and uncomplicated: LOW COMPLEXITY   CLINICAL DECISION MAKING:   Outcome Measure: Tool Used: Lower Extremity Functional Scale (LEFS)  Score:  Initial: 25/80 Most Recent: X/80 (Date: -- )   Interpretation of Score: 20 questions each scored on a 5 point scale with 0 representing \"extreme difficulty or unable to perform\" and 4 representing \"no difficulty\". The lower the score, the greater the functional disability. 80/80 represents no disability. Minimal detectable change is 9 points.     Medical Necessity:   · Ms. Franco Parsons is expected to optimize her lower extremity strength and ROM in preparation for joint replacement surgery. Reason for Services/Other Comments:  · Achieve baseline assesment of musculoskeletal system, functional mobility and home environment. , educate in PT HEP in preparation for surgery, educate in hospital plan of care. Use of outcome tool(s) and clinical judgement create a POC that gives a: Clear prediction of patient's progress: LOW COMPLEXITY   TREATMENT:   Treatment/Session Assessment:  Patient was instructed in PT- HEP to increase strength and ROM in LEs. Answered all questions. · Post session pain:  2  · Compliance with Program/Exercises: compliant most of the time.   Total Treatment Duration:  PT Patient Time In/Time Out  Time In: 1045  Time Out: 4500 [facility]

## 2020-01-28 NOTE — PROGRESS NOTES
HERBER met with pt in Prehab to discuss Right GENARO scheduled for 2/18/20. Pt plans to return home with her spouse and HHPT. Pt resides in [facility] and is chose [facility]. [facility] referral completed. Pt has a cane and BSC. Pt plans to borrow a RW from a friend or [employer]. No additional needs or questions identified at this time. [place] Records    The Plan for Transition of Care is related to the following treatment goals: Home [place]    The Patient  was provided with a choice of provider and agrees   with the discharge plan. [x] Yes [] No    Freedom of choice list was provided with basic dialogue that supports the patient's individualized plan of care/goals, treatment preferences and shares the quality data associated with the providers.  [x] Yes [] No

## 2020-01-28 NOTE — PERIOP NOTES
Patient verified name and . Order for consent  found in EHR and matches case posting; patient verified. Type III surgery, walk in assessment complete. Labs per surgeon: CBC,BMP, PT/PTT, Hgb A1C ; results within limits  Labs per anesthesia protocol: no additional  EKG:performed per anesthesia protocol. Results NSR. Copy placed on chart. MRSA/MSSA swab collected; pharmacy to review and dose antibiotic as appropriate. Hospital approved surgical skin cleanser and instructions to return bottle on DOS given per hospital policy. Patient provided with handouts including Guide to Surgery, Pain Management, Hand Hygiene, Blood Transfusion Education, and [facility] Brochure. Patient answered medical/surgical history questions at their best of ability. All prior to admission medications documented in [facility]. Original medication prescription bottle  visualized during patient appointment. Patient instructed to hold all vitamins 3 weeks prior to surgery and NSAIDS 5 days prior to surgery. Patient teach back successful and patient demonstrates knowledge of instruction.

## 2020-01-28 NOTE — ADVANCED PRACTICE NURSE
Total Joint Surgery Preoperative Chart Review      Patient ID:  Erica Lincoln  [id number]  72 y.o.  1950  Surgeon: Dr. Terry Woods  Date of Surgery: 2/10/2020  Procedure: Total Right Hip Arthroplasty  Primary Care Physician: Juan Graham [phone]  Specialty Physician(s):      Subjective:   Erica Lincoln is a 68 y.o. WHITE OR  female who presents for preoperative evaluation for Total Right Hip arthroplasty. This is a preoperative chart review note based on data collected by the nurse at the surgical Pre-Assessment visit. Past Medical History:   Diagnosis Date    ADD (attention deficit disorder)     Arthritis     osteoarthritis in knees and hands    Depression     Essential hypertension 1/5/2017    Interstitial lung disease due to connective tissue disease ([facility]     Menopause     Mitral valve prolapse     Sciatic nerve disease, right     SLE (systemic lupus erythematosus) ([facility] 9/18/2017    Unspecified adverse effect of anesthesia     Unspecified sleep apnea     sleeps with c-pap      Past Surgical History:   Procedure Laterality Date    HX COLONOSCOPY  08/20/2013    with polypectomy    HX GYN      d and c in [address] [place] [address]  2009    HX OTHER SURGICAL      tonsillectomy at age 3yrs.      Family History   Problem Relation Age of Onset    Heart Disease Sister     Stroke Mother     Cancer Father         Lung    Heart Disease Father         CAD/MI    Cancer Brother         cancer    Lung Disease Brother         COPD    Malignant Hyperthermia Neg Hx     Pseudocholinesterase Deficiency Neg Hx     Post-op Nausea/Vomiting Neg Hx     Emergence Delirium Neg Hx     Post-op Cognitive Dysfunction Neg Hx     Other Neg Hx     Breast Cancer Neg Hx       Social History     Tobacco Use    Smoking status: Never Smoker    Smokeless tobacco: Never Used   Substance Use Topics    Alcohol use: Yes       Prior to Admission medications    Medication Sig Start Date End Date Taking? Authorizing Provider   acetaminophen/diphenhydramine (TYLENOL PM PO) Take  by mouth nightly. Yes Provider, Historical   hydroxychloroquine (PLAQUENIL) 200 mg tablet Take 1 pill once a day after food. Patient taking differently: daily (after breakfast). Take 1 pill once a day after food. 12/17/19  Yes Marie Yin MD   amLODIPine (NORVASC) 5 mg tablet Take 1 Tab by mouth daily. 1/16/19  Yes Fe Quijano MD   amitriptyline (ELAVIL) 10 mg tablet Take 1 Tab by mouth daily. Patient taking differently: Take 10 mg by mouth nightly. 1/16/19  Yes Fe Quijano MD   sertraline (ZOLOFT) 100 mg tablet Take 1 Tab by mouth daily. Patient taking differently: Take 100 mg by mouth nightly. 1/16/19  Yes Fe Quijano MD   rosuvastatin (CRESTOR) 10 mg tablet Take 1 Tab by mouth nightly. 1/16/19  Yes Fe Quijano MD   omeprazole (PRILOSEC) 20 mg capsule Take 1 Cap by mouth two (2) times a day. 1/16/19  Yes Fe Quijano MD   dextroamphetamine-amphetamine (ADDERALL) 30 mg tablet Take 1 Tab by mouth dailyEarliest Fill Date: 2/19/19. Max Daily Amount: 1 Tab 2/19/19  Yes Fe Quijano MD   dextroamphetamine-amphetamine (ADDERALL) 30 mg tablet Take 1 Tab by mouth dailyEarliest Fill Date: 1/19/19. Max Daily Amount: 1 Tab 1/19/19  Yes Fe Quijano MD   dextroamphetamine-amphetamine (ADDERALL) 30 mg tablet Take 1 Tab by mouth dailyEarliest Fill Date: 12/19/18. Max Daily Amount: 1 Tab 12/19/18  Yes Fe Quijano MD   diclofenac EC (VOLTAREN) 75 mg EC tablet Take 1 Tab by mouth two (2) times a day. 12/19/18  Yes Fe Quijano MD   acyclovir (ZOVIRAX) 400 mg tablet Take 1 Tab by mouth three (3) times daily. 12/19/18  Yes Fe Quijano MD   nystatin (MYCOSTATIN) topical cream Apply  to affected area two (2) times a day. 5/9/18  Yes Juan Forrest MD   hydrocortisone (CORTAID) 1 % topical cream Apply  to affected area two (2) times a day.  use thin layer 5/9/18  Yes Destiny Langford MD JN   conjugated estrogens (PREMARIN) 0.625 mg/gram vaginal cream Insert 0.5 g into vagina two (2) days a week. 4/21/18  Yes Lani Cui MD   acetaminophen (TYLENOL) 325 mg tablet Take  by mouth every four (4) hours as needed for Pain. Yes Provider, Historical   alclometasone (ACLOVATE) 0.05 % topical cream Apply  to affected area two (2) times a day. apply thin layer as directed 12/19/18   Soni Chapman MD   ferrous sulfate (IRON) 325 mg (65 mg iron) tablet Take  by mouth Daily (before breakfast). Provider, Historical   aspirin delayed-release 81 mg tablet Take  by mouth daily. Provider, Historical   cholecalciferol (VITAMIN D3) 400 unit tab tablet Take  by mouth daily. Provider, Historical   b complex vitamins (B COMPLEX 1) tablet Take 1 Tab by mouth daily. Provider, Historical   MV,[place],MIN/IRON/FOLIC ACID/LUT (COMPLETE MULTI PO) Take  by mouth. Provider, Historical   cpap machine kit by Does Not Apply route. Provider, Historical   CYANOCOBALAMIN (VITAMIN B-12 PO) take 1 Tab by mouth daily. Vitamin b 12 complex     Provider, Historical   CALCIUM CARBONATE/VITAMIN D3 (CALCIUM 600 + D PO) take 1 Tab by mouth daily. Provider, Historical   ASCORBIC ACID (VITAMIN C PO) take 400 mg by mouth daily. Provider, Historical   DOCOSAHEXANOIC ACID/EPA (FISH OIL PO) Take 1,200 mg by mouth daily. Provider, Historical   vitamin E (AQUA GEMS) 400 unit capsule take 1 Cap by mouth daily. Provider, Historical     Allergies   Allergen Reactions    Sulfa (Sulfonamide Antibiotics) Other (comments)     Visual dusturbances    Other Plant, Animal, Environmental Rash     Nickel or inexpensive metals    Penicillins Rash          Objective:     Physical Exam:   No data found.     ECG:    EKG Results     None          Data Review:   Labs:   Recent Results (from the past 24 hour(s))   CBC WITH AUTOMATED DIFF    Collection Time: 01/28/20 10:45 AM   Result Value Ref Range    WBC 6.4 4.3 - 11.1 K/uL RBC 4.31 4.05 - 5.2 M/uL    HGB 12.6 11.7 - 15.4 g/dL    HCT 40.5 35.8 - 46.3 %    MCV 94.0 79.6 - 97.8 FL    MCH 29.2 26.1 - 32.9 PG    MCHC 31.1 (L) 31.4 - 35.0 g/dL    RDW 12.3 11.9 - 14.6 %    PLATELET 427 682 - 273 K/uL    MPV 11.2 9.4 - 12.3 FL    ABSOLUTE NRBC 0.00 0.0 - 0.2 K/uL    DF AUTOMATED      NEUTROPHILS 74 43 - 78 %    LYMPHOCYTES 14 13 - 44 %    MONOCYTES 8 4.0 - 12.0 %    EOSINOPHILS 2 0.5 - 7.8 %    BASOPHILS 1 0.0 - 2.0 %    IMMATURE GRANULOCYTES 0 0.0 - 5.0 %    ABS. NEUTROPHILS 4.7 1.7 - 8.2 K/UL    ABS. LYMPHOCYTES 0.9 0.5 - 4.6 K/UL    ABS. MONOCYTES 0.5 0.1 - 1.3 K/UL    ABS. EOSINOPHILS 0.2 0.0 - 0.8 K/UL    ABS. BASOPHILS 0.0 0.0 - 0.2 K/UL    ABS. IMM.  GRANS. 0.0 0.0 - 0.5 K/UL   HEMOGLOBIN A1C WITH EAG    Collection Time: 01/28/20 10:45 AM   Result Value Ref Range    Hemoglobin A1c 5.4 %    Est. average glucose 192 mg/dL   METABOLIC PANEL, BASIC    Collection Time: 01/28/20 10:45 AM   Result Value Ref Range    Sodium 143 136 - 145 mmol/L    Potassium 3.4 (L) 3.5 - 5.1 mmol/L    Chloride 106 98 - 107 mmol/L    CO2 31 21 - 32 mmol/L    Anion gap 6 (L) 7 - 16 mmol/L    Glucose 88 65 - 100 mg/dL    BUN 15 8 - 23 MG/DL    Creatinine 0.79 0.6 - 1.0 MG/DL    GFR est AA >60 >60 ml/min/1.73m2    GFR est non-AA >60 >60 ml/min/1.73m2    Calcium 9.2 8.3 - 10.4 MG/DL   PROTHROMBIN TIME + INR    Collection Time: 01/28/20 10:45 AM   Result Value Ref Range    Prothrombin time 12.3 11.7 - 14.5 sec    INR 0.9     PTT    Collection Time: 01/28/20 10:45 AM   Result Value Ref Range    aPTT 28.1 24.7 - 39.8 SEC         Problem List:  )  Patient Active Problem List   Diagnosis Code    Sleep apnea G47.30    Arthritis M19.90    Mitral valve prolapse I34.1    ADD (attention deficit disorder) F98.8    Pain of right lower leg M79.661    Chronic right-sided low back pain with right-sided sciatica M54.41, G89.29    Dyspepsia R10.13    Essential hypertension I10    Interstitial lung disease due to connective tissue disease ([facility] M35.8, J84.89    Other forms of systemic lupus erythematosus ([facility] M32.8    Menopause syndrome N95.1    Raynaud's phenomenon without gangrene I73.00    Palpitations R00.2    Hypercholesterolemia E78.00    Cough R05       Total Joint Surgery Pre-Assessment Recommendations:           Patient is to wear home CPAP during hospitalization.      Signed By: LESLI Perez    January 28, 2020

## 2020-01-28 NOTE — PERIOP NOTES
Lab results within limits per anesthesia protocol; OK for surgery. Recent Results (from the past 12 hour(s))   CBC WITH AUTOMATED DIFF    Collection Time: 01/28/20 10:45 AM   Result Value Ref Range    WBC 6.4 4.3 - 11.1 K/uL    RBC 4.31 4.05 - 5.2 M/uL    HGB 12.6 11.7 - 15.4 g/dL    HCT 40.5 35.8 - 46.3 %    MCV 94.0 79.6 - 97.8 FL    MCH 29.2 26.1 - 32.9 PG    MCHC 31.1 (L) 31.4 - 35.0 g/dL    RDW 12.3 11.9 - 14.6 %    PLATELET 755 502 - 917 K/uL    MPV 11.2 9.4 - 12.3 FL    ABSOLUTE NRBC 0.00 0.0 - 0.2 K/uL    DF AUTOMATED      NEUTROPHILS 74 43 - 78 %    LYMPHOCYTES 14 13 - 44 %    MONOCYTES 8 4.0 - 12.0 %    EOSINOPHILS 2 0.5 - 7.8 %    BASOPHILS 1 0.0 - 2.0 %    IMMATURE GRANULOCYTES 0 0.0 - 5.0 %    ABS. NEUTROPHILS 4.7 1.7 - 8.2 K/UL    ABS. LYMPHOCYTES 0.9 0.5 - 4.6 K/UL    ABS. MONOCYTES 0.5 0.1 - 1.3 K/UL    ABS. EOSINOPHILS 0.2 0.0 - 0.8 K/UL    ABS. BASOPHILS 0.0 0.0 - 0.2 K/UL    ABS. IMM.  GRANS. 0.0 0.0 - 0.5 K/UL   HEMOGLOBIN A1C WITH EAG    Collection Time: 01/28/20 10:45 AM   Result Value Ref Range    Hemoglobin A1c 5.4 %    Est. average glucose 639 mg/dL   METABOLIC PANEL, BASIC    Collection Time: 01/28/20 10:45 AM   Result Value Ref Range    Sodium 143 136 - 145 mmol/L    Potassium 3.4 (L) 3.5 - 5.1 mmol/L    Chloride 106 98 - 107 mmol/L    CO2 31 21 - 32 mmol/L    Anion gap 6 (L) 7 - 16 mmol/L    Glucose 88 65 - 100 mg/dL    BUN 15 8 - 23 MG/DL    Creatinine 0.79 0.6 - 1.0 MG/DL    GFR est AA >60 >60 ml/min/1.73m2    GFR est non-AA >60 >60 ml/min/1.73m2    Calcium 9.2 8.3 - 10.4 MG/DL   PROTHROMBIN TIME + INR    Collection Time: 01/28/20 10:45 AM   Result Value Ref Range    Prothrombin time 12.3 11.7 - 14.5 sec    INR 0.9     PTT    Collection Time: 01/28/20 10:45 AM   Result Value Ref Range    aPTT 28.1 24.7 - 39.8 SEC

## 2020-01-29 LAB
BACTERIA SPEC CULT: ABNORMAL
SERVICE CMNT-IMP: ABNORMAL

## 2020-01-31 VITALS
HEIGHT: 68 IN | BODY MASS INDEX: 21.85 KG/M2 | TEMPERATURE: 97.5 F | RESPIRATION RATE: 16 BRPM | WEIGHT: 144.13 LBS | OXYGEN SATURATION: 95 % | SYSTOLIC BLOOD PRESSURE: 136 MMHG | HEART RATE: 88 BPM | DIASTOLIC BLOOD PRESSURE: 65 MMHG

## 2020-01-31 NOTE — PROGRESS NOTES
01/28/20 1030   Oxygen Therapy   O2 Sat (%) 97 %   Pulse via Oximetry 105 beats per minute   O2 Device Room air   Pre-Treatment   Breath Sounds Bilateral Clear   Pre FEV1 (liters) 2.1 liters   % Predicted 82   Incentive Spirometry Treatment   Actual Volume (ml) 1500 ml     Initial respiratory Assessment completed with pt. Pt was interviewed and evaluated in Joint camp prior to surgery. Patient ID:  Tracy Silva  [id number]  47 y.o.  1950  Surgeon: Dr. Man Bacon  Date of Surgery: The linked surgery was not found. Please check manually. Procedure: Total Right Hip Arthroplasty  Primary Care Physician: Rafal Mario -[phone]  Specialists: [facility] 3/22/2019                     Pt instructed in the use of Incentive Spirometry. Pt instructed to bring Incentive Spirometer back on date of surgery & to start using Is upon return to pt room. Pt taught proper cough technique    History of smoking:   DENIES                 Quit date:         Secondhand smoke:FATHER    Past procedures with Oxygen desaturation or delayed awakening:DENIES    Past Medical History:   Diagnosis Date    ADD (attention deficit disorder)     Rx daily    Arthritis     osteoarthritis in knees and hands    Depression     Essential hypertension 01/05/2017    controlled w/med    Interstitial lung disease due to connective tissue disease ([facility]     followed by Marya Rose. most recent OV 3/2019-ILD r/t SLE. seen annually w/CXR and Spirometry.  Menopause     Mitral valve prolapse     Sciatic nerve disease, right     SLE (systemic lupus erythematosus) ([facility] 09/18/2017    followed by [facility]-most recent [address] 12/2019.  SLE stable w/med    Unspecified sleep apnea     sleeps with c-pap      HX OF PNA 2017, INTERSTITIAL LUNG DISEASE  Respiratory history:DENIES SOB                               Respiratory meds:  DENIES    FAMILY PRESENT:            SPOUSE, PAST SLEEP STUDY:        YES                   HX OF KELLY:                        YES                      KELLY assessment:                                               SLEEPS ON SIDE       &      BACK                                                     PHYSICAL EXAM   Body mass index is 22.24 kg/m².    Visit Vitals  /65 (BP 1 Location: Right arm, BP Patient Position: Sitting)   Pulse 88   Temp 97.5 °F (36.4 °C)   Resp 16   Ht 5' 7.5\" (1.715 m)   Wt 65.4 kg (144 lb 2 oz)   SpO2 95%   BMI 22.24 kg/m²     Neck circumference:  38    cm    Loud snoring:                                                YES             Witnessed apnea or wakening gasping or choking:            APNEA  Awakens with headaches:                                              DENIES    Morning or daytime tiredness/ sleepiness:                            TIRED                                                                                         Dry mouth or sore throat in morning:          YES                                                                                   Thomas stage:  3    SACS score:16  Stop Bang   STOP-BANG  Does the patient snore loudly (louder than talking or loud enough to be heard through closed doors)?: Yes  Does the patient often feel tired, fatigued, or sleepy during the daytime, even after a \"good\" night's sleep?: Yes  Has anyone ever observed the patient stop breathing during their sleep? : Yes  Does the patient have or are they being treated for high blood pressure?: Yes  Is the patient's BMI greater than 35?: No  Is your neck circumference greater than 17 inches (Male) or 16 inches (Female)?: No  Is the patient older than 48?: Yes  Is the patient male?: No  KELLY Score: 5  Has the patient been referred to Sleep Medicine?: No  Has the patient previously been diagnosed with Obstructive Sleep Apnea?: Yes  Treated or Untreated?: Treated                            CPAP: HOME CPAP             Referrals:    Pt. Phone Number:

## 2020-02-14 NOTE — H&P
H&P    Patient ID:  Piotr Lozada  [id number]  74 y.o.  1950  Surgeon:  Angella Perales MD  Date of Surgery: * No surgery date entered *  Procedure: Right Hip Total Arthroplasty  Primary Care Physician: Sae Mendez MD        Subjective:  Piotr Lozada is a 68 y.o. WHITE OR  female who presents with Right Hip pain. She has history of Right Hip pain for several months. Symptoms worse with walking long distances and relieved with rest. Conservative treatment consisting of  activity modification has not helped. The patient lives with their family. The patients goal after surgery is improved pain and function. Past Medical History:   Diagnosis Date    ADD (attention deficit disorder)     Rx daily    Arthritis     osteoarthritis in knees and hands    Depression     Essential hypertension 01/05/2017    controlled w/med    Interstitial lung disease due to connective tissue disease ([facility] [address]     followed by [facility]. most recent OV 3/2019-ILD r/t SLE. seen annually w/CXR and Spirometry.  Menopause     Mitral valve prolapse     Sciatic nerve disease, right     SLE (systemic lupus erythematosus) ([facility] [address] 09/18/2017    followed by [facility]-most recent [address] 12/2019.  SLE stable w/med    Unspecified sleep apnea     sleeps with c-pap      Past Surgical History:   Procedure Laterality Date    HX COLONOSCOPY  08/20/2013    with polypectomy    HX DILATION AND CURETTAGE  1985    HX LUMBAR LAMINECTOMY  2009    HX TONSILLECTOMY  1953     Family History   Problem Relation Age of Onset    Heart Disease Sister     Stroke Mother     Cancer Father         Lung    Heart Disease Father         CAD/MI    Cancer Brother         cancer    Lung Disease Brother         COPD    Malignant Hyperthermia Neg Hx     Pseudocholinesterase Deficiency Neg Hx     Post-op Nausea/Vomiting Neg Hx     Emergence Delirium Neg Hx     Post-op Cognitive Dysfunction Neg Hx     Other Neg Hx  Breast Cancer Neg Hx       Social History     Tobacco Use    Smoking status: Never Smoker    Smokeless tobacco: Never Used   Substance Use Topics    Alcohol use: Yes       Prior to Admission medications    Medication Sig Start Date End Date Taking? Authorizing Provider   acetaminophen/diphenhydramine (TYLENOL PM PO) Take  by mouth nightly. Provider, Nghia   hydroxychloroquine (PLAQUENIL) 200 mg tablet Take 1 pill once a day after food. Patient taking differently: daily (after breakfast). Take 1 pill once a day after food. 12/17/19   Yared Cardenas MD   amLODIPine (NORVASC) 5 mg tablet Take 1 Tab by mouth daily. Patient taking differently: Take 5 mg by mouth daily. Per anesthesia protocol:instructed to take am of surgery. 1/16/19   Emigdio Maldonado MD   amitriptyline (ELAVIL) 10 mg tablet Take 1 Tab by mouth daily. Patient taking differently: Take 10 mg by mouth nightly. 1/16/19   Emigdio Maldonado MD   sertraline (ZOLOFT) 100 mg tablet Take 1 Tab by mouth daily. Patient taking differently: Take 100 mg by mouth nightly. 1/16/19   Emigdio Maldonado MD   rosuvastatin (CRESTOR) 10 mg tablet Take 1 Tab by mouth nightly. 1/16/19   Emigdio Maldonado MD   omeprazole (PRILOSEC) 20 mg capsule Take 1 Cap by mouth two (2) times a day. Patient taking differently: Take 20 mg by mouth two (2) times a day. Per anesthesia protocol:instructed to take am of surgery. 1/16/19   Emigdio Maldonado MD   dextroamphetamine-amphetamine (ADDERALL) 30 mg tablet Take 1 Tab by mouth dailyEarliest Fill Date: 2/19/19. Max Daily Amount: 1 Tab 2/19/19   Emigdio Maldonado MD   dextroamphetamine-amphetamine (ADDERALL) 30 mg tablet Take 1 Tab by mouth dailyEarliest Fill Date: 1/19/19. Max Daily Amount: 1 Tab 1/19/19   Emigdio Maldonado MD   dextroamphetamine-amphetamine (ADDERALL) 30 mg tablet Take 1 Tab by mouth dailyEarliest Fill Date: 12/19/18.   Max Daily Amount: 1 Tab 12/19/18   Emigdio Maldonado MD   diclofenac EC (VOLTAREN) 75 mg EC tablet Take 1 Tab by mouth two (2) times a day. Patient taking differently: Take 75 mg by mouth two (2) times a day. Per anesthesia protocol: pt instructed to stop med 5 days prior to day of surgery. 12/19/18   Angela Chopra MD   acyclovir (ZOVIRAX) 400 mg tablet Take 1 Tab by mouth three (3) times daily. 12/19/18   Angela Chopra MD   alclometasone (ACLOVATE) 0.05 % topical cream Apply  to affected area two (2) times a day. apply thin layer as directed 12/19/18   Angela Chopra MD   nystatin (MYCOSTATIN) topical cream Apply  to affected area two (2) times a day. 5/9/18   Samia Rincon MD   hydrocortisone (CORTAID) 1 % topical cream Apply  to affected area two (2) times a day. use thin layer 5/9/18   Samia Rincon MD   conjugated estrogens (PREMARIN) 0.625 mg/gram vaginal cream Insert 0.5 g into vagina two (2) days a week. 4/21/18   Samia Rincon MD   ferrous sulfate (IRON) 325 mg (65 mg iron) tablet Take  by mouth Daily (before breakfast). Provider, Historical   aspirin delayed-release 81 mg tablet Take  by mouth daily. Provider, Historical   cholecalciferol (VITAMIN D3) 400 unit tab tablet Take  by mouth daily. Provider, Historical   b complex vitamins (B COMPLEX 1) tablet Take 1 Tab by mouth daily. Provider, Historical   MV,[place],MIN/IRON/FOLIC ACID/LUT (COMPLETE MULTI PO) Take  by mouth. Provider, Historical   cpap machine kit by Does Not Apply route. Provider, Historical   acetaminophen (TYLENOL) 325 mg tablet Take  by mouth every four (4) hours as needed for Pain. Provider, Historical   CYANOCOBALAMIN (VITAMIN B-12 PO) take 1 Tab by mouth daily. Vitamin b 12 complex     Provider, Historical   CALCIUM CARBONATE/VITAMIN D3 (CALCIUM 600 + D PO) take 1 Tab by mouth daily. Provider, Historical   ASCORBIC ACID (VITAMIN C PO) take 400 mg by mouth daily. Provider, Historical   DOCOSAHEXANOIC ACID/EPA (FISH OIL PO) Take 1,200 mg by mouth daily. Provider, Historical   vitamin E (AQUA GEMS) 400 unit capsule take 1 Cap by mouth daily. Provider, Historical     Allergies   Allergen Reactions    Other Food Other (comments)     Pineapple, tomatoes    Sulfa (Sulfonamide Antibiotics) Other (comments)     Visual dusturbances    Penicillins Rash    Other Plant, Animal, Environmental Rash     Nickel or inexpensive metals        REVIEW OF SYSTEMS:  CONSTITUTIONAL: Denies fever, decreased appetite, weight loss/gain, night sweats or fatigue. HEENT: Denies vision or hearing changes. denies glasses. denies hearing aids. CARDIAC: Denies CP, palpitations, rheumatic fever, murmur, peripheral edema, carotid artery disease or syncopal episodes. RESPIRATORY: Denies dyspnea on exertion, asthma, COPD or orthopnea. GI: Denies GERD, history of GI bleed or melena, PUD, hepatitis or cirrhosis. : Denies dysuria, hematuria. denies BPH symptoms. HEMATOLOGIC: Denies anemia or blood disorders. ENDOCRINE: Denies thyroid disease. MUSCULOSKELETAL: See HPI. NEUROLOGIC: Denies seizure, peripheral neuropathy or memory loss. PSYCH: Denies depression, anxiety or insomnia. SKIN: Denies rash or open sores. Objective:    PHYSICAL EXAM  GENERAL: No data found. EYES: PERRL. EOM intact. MOUTH:Teeth and Gums normal. NECK: Full ROM. Trachea midline. No thyromegaly or JVD. CARDIOVASCULAR: Regular rate and rhythm. No murmur or gallops. No carotid bruits. Peripheral pulses: radial 2 +, PT 2+, DP 2+ bilaterally. LUNGS: CTA bilaterally. No wheezes, rhonchi or rales. GI: positive BS. Abdomen nontender. NEUROLOGIC: Alert and oriented x 3. Bilateral equal strong had grasp and bilateral equal strong plantar flexion and dorsiflexion. GAIT: abnormal MUSCULOSKELETAL: ROM: full without pain. Tenderness: . Crepitus: not present. SKIN: No rash, bruising, swelling, redness or warmth. Labs:  No results found for this or any previous visit (from the past 24 hour(s)).     Xray Right Hip: joint space narrowing    Assessment:  Advanced Right Hip Osteoarthritis. Total Right Hip Arthroplasty Indicated. Patient Active Problem List   Diagnosis Code    Sleep apnea G47.30    Arthritis M19.90    Mitral valve prolapse I34.1    ADD (attention deficit disorder) F98.8    Pain of right lower leg M79.661    Chronic right-sided low back pain with right-sided sciatica M54.41, G89.29    Dyspepsia R10.13    Essential hypertension I10    Interstitial lung disease due to connective tissue disease ([facility] [address] M35.8, J84.89    Other forms of systemic lupus erythematosus ([facility] [address] M32.8    Menopause syndrome N95.1    Raynaud's phenomenon without gangrene I73.00    Palpitations R00.2    Hypercholesterolemia E78.00    Cough R05       Plan:  I have advised the patient of the risks and consequences, including possible complications of performing total joint replacement, as well as not doing this operation. The patient had the opportunity to ask questions and have them answered to their satisfaction.      Signed:  JENNA Taylor 2/14/2020  2

## 2020-02-18 ENCOUNTER — HOSPITAL ENCOUNTER (INPATIENT)
Age: 70
LOS: 1 days | Discharge: HOME HEALTH | DRG: 470 | End: 2020-02-19
Attending: ORTHOPAEDIC SURGERY | Admitting: ORTHOPAEDIC SURGERY
Payer: MEDICARE

## 2020-02-18 ENCOUNTER — APPOINTMENT (OUTPATIENT)
Dept: GENERAL RADIOLOGY | Age: 70
DRG: 470 | End: 2020-02-18
Attending: ORTHOPAEDIC SURGERY
Payer: MEDICARE

## 2020-02-18 ENCOUNTER — ANESTHESIA (OUTPATIENT)
Dept: SURGERY | Age: 70
DRG: 470 | End: 2020-02-18
Payer: MEDICARE

## 2020-02-18 ENCOUNTER — ANESTHESIA EVENT (OUTPATIENT)
Dept: SURGERY | Age: 70
DRG: 470 | End: 2020-02-18
Payer: MEDICARE

## 2020-02-18 DIAGNOSIS — Z96.641 STATUS POST TOTAL REPLACEMENT OF RIGHT HIP: Primary | ICD-10-CM

## 2020-02-18 PROBLEM — M16.11 ARTHRITIS OF RIGHT HIP: Status: ACTIVE | Noted: 2020-02-18

## 2020-02-18 LAB
GLUCOSE BLD STRIP.AUTO-MCNC: 102 MG/DL (ref 65–100)
HGB BLD-MCNC: 10.6 G/DL (ref 11.7–15.4)

## 2020-02-18 PROCEDURE — 74011000250 HC RX REV CODE- 250: Performed by: NURSE ANESTHETIST, CERTIFIED REGISTERED

## 2020-02-18 PROCEDURE — 74011000258 HC RX REV CODE- 258: Performed by: ORTHOPAEDIC SURGERY

## 2020-02-18 PROCEDURE — 77030018547 HC SUT ETHBND1 J&J -B: Performed by: ORTHOPAEDIC SURGERY

## 2020-02-18 PROCEDURE — C1776 JOINT DEVICE (IMPLANTABLE): HCPCS | Performed by: ORTHOPAEDIC SURGERY

## 2020-02-18 PROCEDURE — 77030031139 HC SUT VCRL2 J&J -A: Performed by: ORTHOPAEDIC SURGERY

## 2020-02-18 PROCEDURE — 74011000250 HC RX REV CODE- 250: Performed by: ANESTHESIOLOGY

## 2020-02-18 PROCEDURE — 85018 HEMOGLOBIN: CPT

## 2020-02-18 PROCEDURE — 74011250636 HC RX REV CODE- 250/636: Performed by: NURSE ANESTHETIST, CERTIFIED REGISTERED

## 2020-02-18 PROCEDURE — 74011250637 HC RX REV CODE- 250/637: Performed by: ANESTHESIOLOGY

## 2020-02-18 PROCEDURE — 82962 GLUCOSE BLOOD TEST: CPT

## 2020-02-18 PROCEDURE — 77030002966 HC SUT PDS J&J -A: Performed by: ORTHOPAEDIC SURGERY

## 2020-02-18 PROCEDURE — 76060000033 HC ANESTHESIA 1 TO 1.5 HR: Performed by: ORTHOPAEDIC SURGERY

## 2020-02-18 PROCEDURE — 97110 THERAPEUTIC EXERCISES: CPT

## 2020-02-18 PROCEDURE — 76210000016 HC OR PH I REC 1 TO 1.5 HR: Performed by: ORTHOPAEDIC SURGERY

## 2020-02-18 PROCEDURE — 0SR904A REPLACEMENT OF RIGHT HIP JOINT WITH CERAMIC ON POLYETHYLENE SYNTHETIC SUBSTITUTE, UNCEMENTED, OPEN APPROACH: ICD-10-PCS | Performed by: ORTHOPAEDIC SURGERY

## 2020-02-18 PROCEDURE — 77030018836 HC SOL IRR NACL ICUM -A: Performed by: ORTHOPAEDIC SURGERY

## 2020-02-18 PROCEDURE — 97535 SELF CARE MNGMENT TRAINING: CPT

## 2020-02-18 PROCEDURE — 77030037713 HC CLOSR DEV INCIS ZIP STRY -B: Performed by: ORTHOPAEDIC SURGERY

## 2020-02-18 PROCEDURE — 76010000161 HC OR TIME 1 TO 1.5 HR INTENSV-TIER 1: Performed by: ORTHOPAEDIC SURGERY

## 2020-02-18 PROCEDURE — 65270000029 HC RM PRIVATE

## 2020-02-18 PROCEDURE — 74011000250 HC RX REV CODE- 250: Performed by: ORTHOPAEDIC SURGERY

## 2020-02-18 PROCEDURE — 97161 PT EVAL LOW COMPLEX 20 MIN: CPT

## 2020-02-18 PROCEDURE — 77030013708 HC HNDPC SUC IRR PULS STRY –B: Performed by: ORTHOPAEDIC SURGERY

## 2020-02-18 PROCEDURE — 77030007880 HC KT SPN EPDRL BBMI -B: Performed by: ANESTHESIOLOGY

## 2020-02-18 PROCEDURE — 77030012935 HC DRSG AQUACEL BMS -B: Performed by: ORTHOPAEDIC SURGERY

## 2020-02-18 PROCEDURE — 74011250636 HC RX REV CODE- 250/636: Performed by: ANESTHESIOLOGY

## 2020-02-18 PROCEDURE — 74011250636 HC RX REV CODE- 250/636: Performed by: ORTHOPAEDIC SURGERY

## 2020-02-18 PROCEDURE — 97165 OT EVAL LOW COMPLEX 30 MIN: CPT

## 2020-02-18 PROCEDURE — 74011250637 HC RX REV CODE- 250/637: Performed by: ORTHOPAEDIC SURGERY

## 2020-02-18 PROCEDURE — 77030019557 HC ELECTRD VES SEAL MEDT -F: Performed by: ORTHOPAEDIC SURGERY

## 2020-02-18 PROCEDURE — 77030018673: Performed by: ORTHOPAEDIC SURGERY

## 2020-02-18 PROCEDURE — 72170 X-RAY EXAM OF PELVIS: CPT

## 2020-02-18 PROCEDURE — 36415 COLL VENOUS BLD VENIPUNCTURE: CPT

## 2020-02-18 PROCEDURE — 94760 N-INVAS EAR/PLS OXIMETRY 1: CPT

## 2020-02-18 PROCEDURE — 77030037715 HC DRSG ZIP STRY -B: Performed by: ORTHOPAEDIC SURGERY

## 2020-02-18 PROCEDURE — 77030006835 HC BLD SAW SAG STRY -B: Performed by: ORTHOPAEDIC SURGERY

## 2020-02-18 PROCEDURE — 77030003666 HC NDL SPINAL BD -A: Performed by: ORTHOPAEDIC SURGERY

## 2020-02-18 PROCEDURE — 77030040922 HC BLNKT HYPOTHRM STRY -A: Performed by: ANESTHESIOLOGY

## 2020-02-18 PROCEDURE — 77030033067 HC SUT PDO STRATFX SPIR J&J -B: Performed by: ORTHOPAEDIC SURGERY

## 2020-02-18 PROCEDURE — 77030003665 HC NDL SPN BBMI -A: Performed by: ANESTHESIOLOGY

## 2020-02-18 DEVICE — PINNACLE HIP SOLUTIONS ALTRX POLYETHYLENE ACETABULAR LINER NEUTRAL 36MM ID 54MM OD
Type: IMPLANTABLE DEVICE | Site: HIP | Status: FUNCTIONAL
Brand: PINNACLE ALTRX

## 2020-02-18 RX ORDER — HYDROCORTISONE 1 %
CREAM (GRAM) TOPICAL 2 TIMES DAILY
Status: DISCONTINUED | OUTPATIENT
Start: 2020-02-18 | End: 2020-02-18

## 2020-02-18 RX ORDER — DEXAMETHASONE SODIUM PHOSPHATE 100 MG/10ML
10 INJECTION INTRAMUSCULAR; INTRAVENOUS ONCE
Status: DISCONTINUED | OUTPATIENT
Start: 2020-02-19 | End: 2020-02-19 | Stop reason: HOSPADM

## 2020-02-18 RX ORDER — TRANEXAMIC ACID 100 MG/ML
INJECTION, SOLUTION INTRAVENOUS AS NEEDED
Status: DISCONTINUED | OUTPATIENT
Start: 2020-02-18 | End: 2020-02-18 | Stop reason: HOSPADM

## 2020-02-18 RX ORDER — HYDROMORPHONE HYDROCHLORIDE 1 MG/ML
1 INJECTION, SOLUTION INTRAMUSCULAR; INTRAVENOUS; SUBCUTANEOUS
Status: DISCONTINUED | OUTPATIENT
Start: 2020-02-18 | End: 2020-02-19 | Stop reason: HOSPADM

## 2020-02-18 RX ORDER — SODIUM CHLORIDE 0.9 % (FLUSH) 0.9 %
5-40 SYRINGE (ML) INJECTION AS NEEDED
Status: DISCONTINUED | OUTPATIENT
Start: 2020-02-18 | End: 2020-02-19 | Stop reason: HOSPADM

## 2020-02-18 RX ORDER — MIDAZOLAM HYDROCHLORIDE 1 MG/ML
INJECTION, SOLUTION INTRAMUSCULAR; INTRAVENOUS AS NEEDED
Status: DISCONTINUED | OUTPATIENT
Start: 2020-02-18 | End: 2020-02-18 | Stop reason: HOSPADM

## 2020-02-18 RX ORDER — CELECOXIB 200 MG/1
200 CAPSULE ORAL EVERY 12 HOURS
Status: DISCONTINUED | OUTPATIENT
Start: 2020-02-18 | End: 2020-02-19 | Stop reason: HOSPADM

## 2020-02-18 RX ORDER — MIDAZOLAM HYDROCHLORIDE 1 MG/ML
2 INJECTION, SOLUTION INTRAMUSCULAR; INTRAVENOUS
Status: DISCONTINUED | OUTPATIENT
Start: 2020-02-18 | End: 2020-02-18 | Stop reason: HOSPADM

## 2020-02-18 RX ORDER — SODIUM CHLORIDE, SODIUM LACTATE, POTASSIUM CHLORIDE, CALCIUM CHLORIDE 600; 310; 30; 20 MG/100ML; MG/100ML; MG/100ML; MG/100ML
100 INJECTION, SOLUTION INTRAVENOUS CONTINUOUS
Status: DISCONTINUED | OUTPATIENT
Start: 2020-02-18 | End: 2020-02-18 | Stop reason: HOSPADM

## 2020-02-18 RX ORDER — NALOXONE HYDROCHLORIDE 0.4 MG/ML
.2-.4 INJECTION, SOLUTION INTRAMUSCULAR; INTRAVENOUS; SUBCUTANEOUS
Status: DISCONTINUED | OUTPATIENT
Start: 2020-02-18 | End: 2020-02-19 | Stop reason: HOSPADM

## 2020-02-18 RX ORDER — ACETAMINOPHEN 500 MG
1000 TABLET ORAL ONCE
Status: DISCONTINUED | OUTPATIENT
Start: 2020-02-18 | End: 2020-02-18 | Stop reason: HOSPADM

## 2020-02-18 RX ORDER — EPHEDRINE SULFATE/0.9% NACL/PF 50 MG/5 ML
SYRINGE (ML) INTRAVENOUS AS NEEDED
Status: DISCONTINUED | OUTPATIENT
Start: 2020-02-18 | End: 2020-02-18 | Stop reason: HOSPADM

## 2020-02-18 RX ORDER — ACETAMINOPHEN 500 MG
1000 TABLET ORAL EVERY 6 HOURS
Status: DISCONTINUED | OUTPATIENT
Start: 2020-02-19 | End: 2020-02-19 | Stop reason: HOSPADM

## 2020-02-18 RX ORDER — LIDOCAINE HYDROCHLORIDE 10 MG/ML
0.3 INJECTION INFILTRATION; PERINEURAL ONCE
Status: COMPLETED | OUTPATIENT
Start: 2020-02-18 | End: 2020-02-18

## 2020-02-18 RX ORDER — ASPIRIN 81 MG/1
81 TABLET ORAL EVERY 12 HOURS
Status: DISCONTINUED | OUTPATIENT
Start: 2020-02-18 | End: 2020-02-19 | Stop reason: HOSPADM

## 2020-02-18 RX ORDER — DEXTROAMPHETAMINE SACCHARATE, AMPHETAMINE ASPARTATE, DEXTROAMPHETAMINE SULFATE AND AMPHETAMINE SULFATE 2.5; 2.5; 2.5; 2.5 MG/1; MG/1; MG/1; MG/1
30 TABLET ORAL DAILY
Status: DISCONTINUED | OUTPATIENT
Start: 2020-02-19 | End: 2020-02-19 | Stop reason: HOSPADM

## 2020-02-18 RX ORDER — ROSUVASTATIN CALCIUM 5 MG/1
10 TABLET, COATED ORAL
Status: DISCONTINUED | OUTPATIENT
Start: 2020-02-18 | End: 2020-02-19 | Stop reason: HOSPADM

## 2020-02-18 RX ORDER — LIDOCAINE HYDROCHLORIDE 20 MG/ML
INJECTION, SOLUTION EPIDURAL; INFILTRATION; INTRACAUDAL; PERINEURAL AS NEEDED
Status: DISCONTINUED | OUTPATIENT
Start: 2020-02-18 | End: 2020-02-18 | Stop reason: HOSPADM

## 2020-02-18 RX ORDER — OXYCODONE HYDROCHLORIDE 5 MG/1
5-10 TABLET ORAL
Status: DISCONTINUED | OUTPATIENT
Start: 2020-02-18 | End: 2020-02-19 | Stop reason: HOSPADM

## 2020-02-18 RX ORDER — OXYCODONE HYDROCHLORIDE 5 MG/1
10 TABLET ORAL
Status: COMPLETED | OUTPATIENT
Start: 2020-02-18 | End: 2020-02-18

## 2020-02-18 RX ORDER — ONDANSETRON 2 MG/ML
INJECTION INTRAMUSCULAR; INTRAVENOUS AS NEEDED
Status: DISCONTINUED | OUTPATIENT
Start: 2020-02-18 | End: 2020-02-18 | Stop reason: HOSPADM

## 2020-02-18 RX ORDER — DEXAMETHASONE SODIUM PHOSPHATE 4 MG/ML
INJECTION, SOLUTION INTRA-ARTICULAR; INTRALESIONAL; INTRAMUSCULAR; INTRAVENOUS; SOFT TISSUE AS NEEDED
Status: DISCONTINUED | OUTPATIENT
Start: 2020-02-18 | End: 2020-02-18 | Stop reason: HOSPADM

## 2020-02-18 RX ORDER — CEFAZOLIN SODIUM/WATER 2 G/20 ML
2 SYRINGE (ML) INTRAVENOUS EVERY 8 HOURS
Status: COMPLETED | OUTPATIENT
Start: 2020-02-18 | End: 2020-02-19

## 2020-02-18 RX ORDER — DIPHENHYDRAMINE HCL 25 MG
25 CAPSULE ORAL
Status: DISCONTINUED | OUTPATIENT
Start: 2020-02-18 | End: 2020-02-19 | Stop reason: HOSPADM

## 2020-02-18 RX ORDER — PROPOFOL 10 MG/ML
INJECTION, EMULSION INTRAVENOUS
Status: DISCONTINUED | OUTPATIENT
Start: 2020-02-18 | End: 2020-02-18 | Stop reason: HOSPADM

## 2020-02-18 RX ORDER — AMOXICILLIN 250 MG
2 CAPSULE ORAL DAILY
Status: DISCONTINUED | OUTPATIENT
Start: 2020-02-19 | End: 2020-02-19 | Stop reason: HOSPADM

## 2020-02-18 RX ORDER — ACYCLOVIR 800 MG/1
400 TABLET ORAL 3 TIMES DAILY
Status: DISCONTINUED | OUTPATIENT
Start: 2020-02-18 | End: 2020-02-18

## 2020-02-18 RX ORDER — SODIUM CHLORIDE 0.9 % (FLUSH) 0.9 %
5-40 SYRINGE (ML) INJECTION EVERY 8 HOURS
Status: CANCELLED | OUTPATIENT
Start: 2020-02-18

## 2020-02-18 RX ORDER — ACETAMINOPHEN 10 MG/ML
1000 INJECTION, SOLUTION INTRAVENOUS ONCE
Status: COMPLETED | OUTPATIENT
Start: 2020-02-18 | End: 2020-02-18

## 2020-02-18 RX ORDER — BUPIVACAINE HYDROCHLORIDE 7.5 MG/ML
INJECTION, SOLUTION INTRASPINAL
Status: DISCONTINUED | OUTPATIENT
Start: 2020-02-18 | End: 2020-02-18 | Stop reason: HOSPADM

## 2020-02-18 RX ORDER — CEFAZOLIN SODIUM/WATER 2 G/20 ML
2 SYRINGE (ML) INTRAVENOUS ONCE
Status: COMPLETED | OUTPATIENT
Start: 2020-02-18 | End: 2020-02-18

## 2020-02-18 RX ORDER — PANTOPRAZOLE SODIUM 40 MG/1
40 TABLET, DELAYED RELEASE ORAL
Status: DISCONTINUED | OUTPATIENT
Start: 2020-02-19 | End: 2020-02-19 | Stop reason: HOSPADM

## 2020-02-18 RX ORDER — PROPOFOL 10 MG/ML
INJECTION, EMULSION INTRAVENOUS AS NEEDED
Status: DISCONTINUED | OUTPATIENT
Start: 2020-02-18 | End: 2020-02-18 | Stop reason: HOSPADM

## 2020-02-18 RX ORDER — PROMETHAZINE HYDROCHLORIDE 25 MG/1
25 TABLET ORAL
Status: DISCONTINUED | OUTPATIENT
Start: 2020-02-18 | End: 2020-02-19 | Stop reason: HOSPADM

## 2020-02-18 RX ORDER — FENTANYL CITRATE 50 UG/ML
100 INJECTION, SOLUTION INTRAMUSCULAR; INTRAVENOUS ONCE
Status: DISCONTINUED | OUTPATIENT
Start: 2020-02-18 | End: 2020-02-18 | Stop reason: HOSPADM

## 2020-02-18 RX ORDER — AMITRIPTYLINE HYDROCHLORIDE 10 MG/1
10 TABLET, FILM COATED ORAL
Status: DISCONTINUED | OUTPATIENT
Start: 2020-02-18 | End: 2020-02-19 | Stop reason: HOSPADM

## 2020-02-18 RX ORDER — HYDROMORPHONE HYDROCHLORIDE 2 MG/ML
0.5 INJECTION, SOLUTION INTRAMUSCULAR; INTRAVENOUS; SUBCUTANEOUS
Status: DISCONTINUED | OUTPATIENT
Start: 2020-02-18 | End: 2020-02-18 | Stop reason: HOSPADM

## 2020-02-18 RX ORDER — ONDANSETRON 8 MG/1
8 TABLET, ORALLY DISINTEGRATING ORAL
Status: DISCONTINUED | OUTPATIENT
Start: 2020-02-18 | End: 2020-02-19 | Stop reason: HOSPADM

## 2020-02-18 RX ORDER — SERTRALINE HYDROCHLORIDE 100 MG/1
100 TABLET, FILM COATED ORAL
Status: DISCONTINUED | OUTPATIENT
Start: 2020-02-18 | End: 2020-02-19 | Stop reason: HOSPADM

## 2020-02-18 RX ORDER — ROPIVACAINE HYDROCHLORIDE 2 MG/ML
INJECTION, SOLUTION EPIDURAL; INFILTRATION; PERINEURAL AS NEEDED
Status: DISCONTINUED | OUTPATIENT
Start: 2020-02-18 | End: 2020-02-18 | Stop reason: HOSPADM

## 2020-02-18 RX ORDER — AMLODIPINE BESYLATE 5 MG/1
5 TABLET ORAL DAILY
Status: DISCONTINUED | OUTPATIENT
Start: 2020-02-19 | End: 2020-02-19 | Stop reason: HOSPADM

## 2020-02-18 RX ORDER — SODIUM CHLORIDE 9 MG/ML
100 INJECTION, SOLUTION INTRAVENOUS CONTINUOUS
Status: DISCONTINUED | OUTPATIENT
Start: 2020-02-18 | End: 2020-02-19 | Stop reason: HOSPADM

## 2020-02-18 RX ADMIN — LIDOCAINE HYDROCHLORIDE 0.3 ML: 10 INJECTION, SOLUTION INFILTRATION; PERINEURAL at 10:38

## 2020-02-18 RX ADMIN — PROPOFOL: 10 INJECTION, EMULSION INTRAVENOUS at 13:53

## 2020-02-18 RX ADMIN — Medication 3 AMPULE: at 10:38

## 2020-02-18 RX ADMIN — HYDROMORPHONE HYDROCHLORIDE 1 MG: 1 INJECTION, SOLUTION INTRAMUSCULAR; INTRAVENOUS; SUBCUTANEOUS at 20:47

## 2020-02-18 RX ADMIN — SODIUM CHLORIDE, SODIUM LACTATE, POTASSIUM CHLORIDE, AND CALCIUM CHLORIDE: 600; 310; 30; 20 INJECTION, SOLUTION INTRAVENOUS at 13:52

## 2020-02-18 RX ADMIN — OXYCODONE 10 MG: 5 TABLET ORAL at 17:53

## 2020-02-18 RX ADMIN — DEXAMETHASONE SODIUM PHOSPHATE 5 MG: 4 INJECTION, SOLUTION INTRAMUSCULAR; INTRAVENOUS at 13:20

## 2020-02-18 RX ADMIN — PROPOFOL 20 MG: 10 INJECTION, EMULSION INTRAVENOUS at 13:09

## 2020-02-18 RX ADMIN — AMITRIPTYLINE HYDROCHLORIDE 10 MG: 10 TABLET, FILM COATED ORAL at 20:48

## 2020-02-18 RX ADMIN — LIDOCAINE HYDROCHLORIDE 20 MG: 20 INJECTION, SOLUTION EPIDURAL; INFILTRATION; INTRACAUDAL; PERINEURAL at 13:09

## 2020-02-18 RX ADMIN — Medication 2 G: at 20:46

## 2020-02-18 RX ADMIN — Medication 1 AMPULE: at 20:46

## 2020-02-18 RX ADMIN — TRANEXAMIC ACID 1000 MG: 100 INJECTION, SOLUTION INTRAVENOUS at 13:10

## 2020-02-18 RX ADMIN — Medication 10 MG: at 13:40

## 2020-02-18 RX ADMIN — OXYCODONE 10 MG: 5 TABLET ORAL at 15:16

## 2020-02-18 RX ADMIN — ONDANSETRON 4 MG: 2 INJECTION INTRAMUSCULAR; INTRAVENOUS at 13:20

## 2020-02-18 RX ADMIN — SERTRALINE HYDROCHLORIDE 100 MG: 100 TABLET ORAL at 20:48

## 2020-02-18 RX ADMIN — ONDANSETRON 8 MG: 8 TABLET, ORALLY DISINTEGRATING ORAL at 20:50

## 2020-02-18 RX ADMIN — SODIUM CHLORIDE, SODIUM LACTATE, POTASSIUM CHLORIDE, AND CALCIUM CHLORIDE 100 ML/HR: 600; 310; 30; 20 INJECTION, SOLUTION INTRAVENOUS at 10:38

## 2020-02-18 RX ADMIN — Medication 2 G: at 13:05

## 2020-02-18 RX ADMIN — BUPIVACAINE HYDROCHLORIDE IN DEXTROSE 10 MG: 7.5 INJECTION, SOLUTION SUBARACHNOID at 13:02

## 2020-02-18 RX ADMIN — PROPOFOL 25 MCG/KG/MIN: 10 INJECTION, EMULSION INTRAVENOUS at 13:09

## 2020-02-18 RX ADMIN — ACETAMINOPHEN 1000 MG: 500 TABLET, FILM COATED ORAL at 23:32

## 2020-02-18 RX ADMIN — MIDAZOLAM 2 MG: 1 INJECTION INTRAMUSCULAR; INTRAVENOUS at 12:58

## 2020-02-18 RX ADMIN — CELECOXIB 200 MG: 200 CAPSULE ORAL at 20:47

## 2020-02-18 RX ADMIN — ROSUVASTATIN CALCIUM 10 MG: 5 TABLET, COATED ORAL at 20:48

## 2020-02-18 RX ADMIN — ASPIRIN 81 MG: 81 TABLET ORAL at 20:47

## 2020-02-18 RX ADMIN — ACETAMINOPHEN 1000 MG: 10 INJECTION, SOLUTION INTRAVENOUS at 17:53

## 2020-02-18 RX ADMIN — PROPOFOL 50 MCG/KG/MIN: 10 INJECTION, EMULSION INTRAVENOUS at 13:12

## 2020-02-18 NOTE — ANESTHESIA PROCEDURE NOTES
Spinal Block    Start time: 2/18/2020 12:56 PM  End time: 2/18/2020 1:02 PM  Performed by: Radha Stein MD  Authorized by: Radha Stein MD     Pre-procedure:   Indications: primary anesthetic  Preanesthetic Checklist: patient identified, risks and benefits discussed, anesthesia consent, patient being monitored and timeout performed    Timeout Time: 12:56          Spinal Block:   Patient Position:  Seated  Prep Region:  Lumbar  Prep: chlorhexidine      Location:  L3-4  Technique:  Single shot    Local Dose (mL):  3    Needle:   Needle Type:  Pencan  Needle Gauge:  25 G  Attempts:  1      Events: CSF confirmed, no blood with aspiration and no paresthesia        Assessment:  Insertion:  Uncomplicated  Patient tolerance:  Patient tolerated the procedure well with no immediate complications  All needles out intact, procedure tolerated well without problems

## 2020-02-18 NOTE — PROGRESS NOTES
02/18/20 1615   Oxygen Therapy   O2 Sat (%) 97 %   Pulse via Oximetry 90 beats per minute   O2 Device Room air   Patient achieved   2000   Ml/sec on IS. Patient encouraged to do 10 breaths every hour while awake-patient agreed and demonstrated. No shortness of breath or distress noted. BS are clear b/l. Joint [place] notes reviewed- patient stated that she has home CPAP that she is planning on using.

## 2020-02-18 NOTE — PERIOP NOTES
Teach back method used with patient concerning hibiclens wash, TB screening, incentive spirometer, pain management goals, and discharge needs list.Is goal 1500

## 2020-02-18 NOTE — ANESTHESIA PREPROCEDURE EVALUATION
Relevant Problems   No relevant active problems       Anesthetic History   No history of anesthetic complications            Review of Systems / Medical History  Patient summary reviewed and pertinent labs reviewed    Pulmonary        Sleep apnea: CPAP           Neuro/Psych   Within defined limits           Cardiovascular    Hypertension              Exercise tolerance: >4 METS     GI/Hepatic/Renal     GERD: well controlled           Endo/Other        Arthritis     Other Findings              Physical Exam    Airway  Mallampati: I  TM Distance: 4 - 6 cm  Neck ROM: normal range of motion   Mouth opening: Normal     Cardiovascular  Regular rate and rhythm,  S1 and S2 normal,  no murmur, click, rub, or gallop  Rhythm: regular  Rate: normal         Dental  No notable dental hx       Pulmonary  Breath sounds clear to auscultation               Abdominal         Other Findings            Anesthetic Plan    ASA: 2  Anesthesia type: spinal          Induction: Intravenous  Anesthetic plan and risks discussed with: Patient

## 2020-02-18 NOTE — INTERVAL H&P NOTE
H&P Update: 
Vinod Miller was seen and examined. History and physical has been reviewed. The patient has been examined.  There have been no significant clinical changes since the completion of the originally dated History and Physical.

## 2020-02-18 NOTE — PROGRESS NOTES
Problem: Self Care Deficits Care Plan (Adult)  Goal: *Acute Goals and Plan of Care (Insert Text)  Description  GOALS:   DISCHARGE GOALS (in preparation for going home/rehab):  3 days  1. Ms. Ryan Carlos will perform one lower body dressing activity with minimal assistance with adaptive equipment to demonstrate improved functional mobility and safety. 2.  Ms. Ryan Carlos will perform one lower body bathing activity with minimal  assistance with adaptive equipment to demonstrate improved functional mobility and safety. 3.  Ms. Ryan Carlos will perform toileting/toilet transfer with contact guard assistance with adaptive equipment to demonstrate improved functional mobility and safety. 4.  Ms. Ryan Carlos will perform shower transfer with contact guard assistance with adaptive equipment to demonstrate improved functional mobility and safety. 5.  Ms. Ryan Carlos will state GENARO precautions with two verbal cues to demonstrate improved functional mobility and safety. JOINT CAMP OCCUPATIONAL THERAPY GENARO: Initial Assessment and Daily Note 2/18/2020  INPATIENT: Hospital Day: 1  [place] / [place] / Product Type: Medicare /      NAME/AGE/GENDER: Sandro Nuno is a 68 y.o. female   PRIMARY DIAGNOSIS:  Primary localized osteoarthrosis of right hip [M16.11]   Procedure(s) and Anesthesia Type:     * RIGHT TOTAL HIP ARTHROPLASTY - Spinal (Right)  ICD-10: Treatment Diagnosis:    Pain in Right Hip (M25.551)  Stiffness of Right Hip, Not elsewhere classified (M25.651)      ASSESSMENT:     Ms. Ryan Carlos is s/p Right GENARO and presents with decreased weight bearing on R LE and decreased independence with functional mobility and activities of daily living as compared to prior level of function and safety. Patient would benefit from skilled Occupational Therapy to maximize independence and safety with self-care task and functional mobility.   Pt would also benefit from education on lower body adaptive equipment and hip precautions post-surgery in preparation for going home. Patient able to don gown and briefs at edge of bed. Mobilized from bed to toilet to recliner using a rolling walker with assist. Should progress well with ADL's tomorrow. This section established at most recent assessment   PROBLEM LIST (Impairments causing functional limitations):  Decreased Strength  Decreased ADL/Functional Activities  Decreased Transfer Abilities  Increased Pain  Increased Fatigue  Decreased Flexibility/Joint Mobility  Decreased Knowledge of Precautions   INTERVENTIONS PLANNED: (Benefits and precautions of occupational therapy have been discussed with the patient.)  Activities of daily living training  Adaptive equipment training  Balance training  Clothing management  Donning&doffing training  Theraputic activity     TREATMENT PLAN: Frequency/Duration: Follow patient 1-2tx to address above goals. Rehabilitation Potential For Stated Goals: Excellent     RECOMMENDED REHABILITATION/EQUIPMENT: (at time of discharge pending progress): Continue Skilled Therapy. OCCUPATIONAL PROFILE AND HISTORY:   History of Present Injury/Illness (Reason for Referral): Pt presents this date s/p (Right) GENARO. Past Medical History/Comorbidities:   Ms. Kaylene Daniels  has a past medical history of ADD (attention deficit disorder), Arthritis, Depression, Essential hypertension (01/05/2017), Interstitial lung disease due to connective tissue disease ([address] Menopause, Mitral valve prolapse, Sciatic nerve disease, right, SLE (systemic lupus erythematosus) ([address] (09/18/2017), and Unspecified sleep apnea.  She also has no past medical history of Aneurysm ([address] Arrhythmia, Asthma, CAD (coronary artery disease), Cancer ([address] Chronic kidney disease, Chronic obstructive pulmonary disease ([address] Chronic pain, Coagulation disorder ([address] COPD, Diabetes ([address] Difficult intubation, Endocarditis, GERD (gastroesophageal reflux disease), Heart failure ([address] Liver disease, Malignant hyperthermia due to anesthesia, Morbid obesity ([facility] [address] Nausea & vomiting, Nicotine vapor product user, Non-nicotine vapor product user, Pseudocholinesterase deficiency, PUD (peptic ulcer disease), Rheumatic fever, Seizures ([facility] [address] Stroke ([facility] [address] Thromboembolus ([facility] [address] or Thyroid disease. Ms. Jacqueline Mims  has a past surgical history that includes hx lumbar laminectomy (2009); hx colonoscopy (08/20/2013); hx tonsillectomy (1953); and hx dilation and curettage (1985). Social History/Living Environment:   Home Environment: Private residence  One/Two Story Residence: One story  Living Alone: No  Support Systems: Spouse/Significant Other/Partner  Patient Expects to be Discharged to[de-identified] Private residence  Current DME Used/Available at Home: Skye Beavers  Prior Level of Function/Work/Activity:  Independent prior. Number of Personal Factors/Comorbidities that affect the Plan of Care: Brief history (0):  LOW COMPLEXITY   ASSESSMENT OF OCCUPATIONAL PERFORMANCE[de-identified]   Most Recent Physical Functioning:   Balance  Sitting: Intact  Standing: With support                    Coordination  Fine Motor Skills-Upper: Left Intact; Right Intact  Gross Motor Skills-Upper: Left Intact; Right Intact         Mental Status  Neurologic State: Alert  Orientation Level: Oriented X4  Cognition: Appropriate decision making  Perception: Appears intact                Basic ADLs (From Assessment) Complex ADLs (From Assessment)   Basic ADL  Feeding: Independent  Oral Facial Hygiene/Grooming: Setup  Bathing: Minimum assistance  Upper Body Dressing: Setup  Lower Body Dressing: Moderate assistance  Toileting: Moderate assistance     Grooming/Bathing/Dressing Activities of Daily Living                       Functional Transfers  Bathroom Mobility: Minimum assistance  Toilet Transfer : Moderate assistance  Shower Transfer:  Moderate assistance     Bed/Mat Mobility  Supine to Sit: Minimum assistance  Sit to Stand: Minimum assistance  Stand to Sit: Minimum assistance  Bed to Chair: Minimum assistance         Physical Skills Involved:  Range of Motion  Balance  Strength Cognitive Skills Affected (resulting in the inability to perform in a timely and safe manner):  [facility]  Psychosocial Skills Affected:  WFL    Number of elements that affect the Plan of Care: 1-3:  LOW COMPLEXITY   CLINICAL DECISION MAKIN[address] [place] [address] AM-PAC 6 Clicks   Daily Activity Inpatient Short Form  How much help from another person does the patient currently need. .. Total A Lot A Little None   1. Putting on and taking off regular lower body clothing? [] 1   [x] 2   [] 3   [] 4   2. Bathing (including washing, rinsing, drying)? [] 1   [x] 2   [] 3   [] 4   3. Toileting, which includes using toilet, bedpan or urinal?   [] 1   [x] 2   [] 3   [] 4   4. Putting on and taking off regular upper body clothing? [] 1   [] 2   [] 3   [x] 4   5. Taking care of personal grooming such as brushing teeth? [] 1   [] 2   [] 3   [x] 4   6. Eating meals? [] 1   [] 2   [] 3   [x] 4   © , Trustees of [address] [place] [address], under license to [employer]. All rights reserved     Score:  Initial: 18 Most Recent: X (Date: -- )    Interpretation of Tool:  Represents activities that are increasingly more difficult (i.e. Bed mobility, Transfers, Gait). Medical Necessity:     Skilled intervention continues to be required due to Deficits noted above. Reason for Services/Other Comments:  Patient continues to require skilled intervention due to   New TKA   .    Use of outcome tool(s) and clinical judgement create a POC that gives a: MODERATE COMPLEXITY            TREATMENT:   (In addition to Assessment/Re-Assessment sessions the following treatments were rendered)     Pre-treatment Symptoms/Complaints:    Pain: Initial:   Pain Intensity 1: 4  Post Session:  4     Self Care: (10): Procedure(s) (per grid) utilized to improve and/or restore self-care/home management as related to dressing, bathing, and toileting. Required minimal verbal and tactile cueing to facilitate activities of daily living skills. Initial evaluation 5 minutes. Treatment/Session Assessment:     Response to Treatment:  Good, sitting up in recliner. Education:  [] Home Exercises  [x] Fall Precautions  [x] Hip Precautions [] Going Home Video  [] Knee/Hip Prosthesis Review  [x] Walker Management/Safety [x] Adaptive Equipment as Needed       Interdisciplinary Collaboration:   Physical Therapist  Occupational Therapist  Registered Nurse    After treatment position/precautions:   Up in chair  Bed/Chair-wheels locked  Caregiver at bedside  Call light within reach  RN notified     Compliance with Program/Exercises: Compliant all of the time, Will assess as treatment progresses. Recommendations/Intent for next treatment session:  Treatment next visit will focus on increasing Ms. Tonio Graves independence with bed mobility, transfers, self care, functional mobility, modalities for pain, and patient education.       Total Treatment Duration:  OT Patient Time In/Time Out  Time In: 1615  Time Out: [address] [place], [place]

## 2020-02-18 NOTE — PROGRESS NOTES
Admission Assessment Complete. Pt had a RTHA today. Pt is A&Ox 3.  +2 pedal pulses with purposeful movement in all four extremities. Dressing is clean, dry and intact. Pt denies any pain or need at this time. Bed low and locked. Side rails x3. Call light with in reach. Pt verbalizes understanding of call light.

## 2020-02-18 NOTE — ANESTHESIA POSTPROCEDURE EVALUATION
Procedure(s):  RIGHT TOTAL HIP ARTHROPLASTY. spinal    Anesthesia Post Evaluation      Multimodal analgesia: multimodal analgesia used between 6 hours prior to anesthesia start to PACU discharge  Patient location during evaluation: bedside  Patient participation: complete - patient participated  Level of consciousness: awake and alert  Pain management: adequate  Airway patency: patent  Anesthetic complications: no  Cardiovascular status: hemodynamically stable  Respiratory status: spontaneous ventilation  Hydration status: euvolemic  Comments: Patient stable and may discharge at this time. Good result with spinal anesthesia, resolving normally.     Vitals Value Taken Time   /59 2/18/2020  2:34 PM   Temp     Pulse 73 2/18/2020  2:34 PM   Resp 16 2/18/2020  2:34 PM   SpO2 96 % 2/18/2020  2:34 PM

## 2020-02-18 NOTE — ANESTHESIA PROCEDURE NOTES
Peripheral Block Performed by: Melissa Aviles MD 
Authorized by: Melissa Aviles MD  
 
 
Block Type: Assessment: 
 
Injection Assessment:

## 2020-02-18 NOTE — PERIOP NOTES
TRANSFER - OUT REPORT:    Verbal report given to Jammie Ribeiro on Martha Valencia  being transferred to [facility] routine progression of care       Report consisted of patients Situation, Background, Assessment and   Recommendations(SBAR). Information from the following report(s) SBAR, Kardex, [facility] and Recent Results was reviewed with the receiving nurse. Lines:   Peripheral IV 08/55/90 Left Cephalic (Active)   Site Assessment Clean, dry, & intact 2/18/2020 10:39 AM   Phlebitis Assessment 0 2/18/2020 10:39 AM   Infiltration Assessment 0 2/18/2020 10:39 AM   Dressing Status Clean, dry, & intact 2/18/2020 10:39 AM   Dressing Type Tape;Transparent 2/18/2020 10:39 AM   Hub Color/Line Status Infusing 2/18/2020 10:39 AM   Action Taken Blood drawn 2/18/2020 10:39 AM        Opportunity for questions and clarification was provided.       Patient transported with:   [employer]

## 2020-02-18 NOTE — OP NOTES
[address] [facility]  Total Hip Procedure Note  Patient:Kenyetta Mary   : 1950  Medical Record [id number]      Pre-operative Diagnosis:  Primary localized osteoarthrosis of right hip [M16.11]  Post-operative Diagnosis: Primary localized osteoarthrosis of right hip [M16.11]    Surgeon: Som Martino MD  Assistant:Carlos Corley PA-C    Anesthesia: Spinal      Procedure: Total Hip Arthroplasty   The complexity of the total joint surgery requires the use of a first assistant for positioning, retraction and assistance in closure. The patient's Body mass index is 22.24 kg/m²., BMI's greater then 40 make surgical exposure and retraction extremely difficult and increase operative time. Marvel Stevenson was brought to the operating room and positioned on the operating table. She was anesthestized . IV antibiotics per CMS protocol were administered. Prior to the incision being made a timeout was called identifying the patient, procedure ,operative side and surgeon. Marvel Stevenson was positioned in the lateral decubitus position with the  right  side up. The limb was prepped and draped in the usual sterile fashion. The direct lateral approach was utilized to expose the hip. The incision was carried through the subcutaneous tissue and underlying fascia with homeostasis obtained using the bovie cauterization. A Charnley retractor was inserted. The abductors were taken down at the junction of the anterior and middle third. The sciatic nerve was palpated and identified. Following comparison of leg lengths, the femoral head was dislocated. The neck was osteotomized in the appropriate position just above the lesser trochanteric region. The acetabular retractor was placed and the appropriate capsulotomy performed. Soft tissue was removed from the acetabulum. The acetabulum was sequentially reamed. Using trial components the acetabulum was sized to a 54 mm acetabular cup.       Utilizing the femoral retractor, the canal was prepared with appropriate laterization and reamed with the starter reamer. The canal was then broached progressively to size 4. The calcar planar was untilized. A trial reduction with a size +5.0 neck length was utilized. The Head size was 36mm. This was found to be the most stable to flexion greater than 90 degrees and an internal and external rotation. Limb lengths were found to be equilibrated and with appropriate stability as mentioned above. All trial components were removed. The cementless permanent stem was impacted into place. A trial reduction was performed and the appropiate neck length was selected. A permanent 36mm femoral head was impacted into place. Kenyetta Carias's hip was reduced and the stability was as mentioned as above. The betadine lavage protocol was used. The sciatic nerve was palpated and noted to be intact. The abductors were repaired through drill holes in the greater trochanter. The remainder was closed in layers with a Zipline closure for the skin. The patient was then rolled to a supine position. The sponge and needle counts were correct. The patient tolerated the procedure without difficulty and left the operating room in satisfactory condition. EBL:300cc  Additional Findings: Severe DJD  Implants:   Implant Name Type Inv.  Item Serial No.  Lot No. LRB No. Used Action   Acetabular shell    [employer] [id number] Right 1 Implanted   [place] hole eliminator    [employer] [id number] Right 1 Implanted   ALTRX NEUT [id number] - [id number]  ALTRX NEUT [id number]  [employer] [id number] Right 1 Implanted   Femoral stem    [id number] [id number] Right 1 Implanted   Ceramic femoral head    [employer] [id number] Right 1 Implanted     Signed By: Leila Razo MD

## 2020-02-18 NOTE — PERIOP NOTES
TRANSFER - OUT REPORT:    Verbal report given to Tomi( name) on Luis Creed  being transferred to [facility](unit) for routine post - op       Report consisted of patients Situation, Background, Assessment and   Recommendations(SBAR). Information from the following report(s) SBAR, Procedure Summary and MAR was reviewed with the receiving nurse. Lines:   Peripheral IV 17/89/92 Left Cephalic (Active)   Site Assessment Clean, dry, & intact 2/18/2020  2:49 PM   Phlebitis Assessment 0 2/18/2020  2:49 PM   Infiltration Assessment 0 2/18/2020  2:49 PM   Dressing Status Clean, dry, & intact 2/18/2020  2:49 PM   Dressing Type Transparent;Tape 2/18/2020  2:49 PM   Hub Color/Line Status Infusing 2/18/2020 10:39 AM   Action Taken Blood drawn 2/18/2020 10:39 AM        Opportunity for questions and clarification was provided.       Patient transported with:

## 2020-02-18 NOTE — PROGRESS NOTES
Problem: Mobility Impaired (Adult and Pediatric)  Goal: *Acute Goals and Plan of Care (Insert Text)  Note:   GOALS (1-4 days):  (1.)Ms. Rad Chamberlain will move from supine to sit and sit to supine  in bed with CONTACT GUARD ASSIST.    (2.)Ms. Rad Chamberlain will transfer from bed to chair and chair to bed with SUPERVISION using the least restrictive device. (3.)Ms. Rad Chamberlain will ambulate with SUPERVISION for 250 feet with the least restrictive device. (4.)Ms. Rad Chamberlain will ambulate up/down 4 steps with bilateral  railing with CONTACT GUARD ASSIST with no device. (5.)Ms. Rda Chamberlain will state/observe GENARO precautions with 0 verbal cues. ________________________________________________________________________________________________      PHYSICAL THERAPY JOINT CAMP GENARO: Initial Assessment and PM 2/18/2020  INPATIENT: Hospital Day: 1  [place] / [place] / Product Type: Medicare /      NAME/AGE/GENDER: Felicia Fagan is a 68 y.o. female   PRIMARY DIAGNOSIS:  Primary localized osteoarthrosis of right hip [M16.11]   Procedure(s) and Anesthesia Type:     * RIGHT TOTAL HIP ARTHROPLASTY - Spinal (Right)  ICD-10: Treatment Diagnosis:    Pain in Right Hip (M25.551)  Stiffness of Right Hip, Not elsewhere classified (M25.651)  Other abnormalities of gait and mobility (R26.89)      ASSESSMENT:     Ms. Rad Chamberlain presents S/P R GENARO and demonstrates a decline in her premorbid level of function. She would benefit from further PT while here to address these deficits: decreased R LE strength,ROM,sensation , standing balance, functional mobility and GENARO awareness. She plans on going home at [place] with her spouse's assist. She did have some stress incontinence when getting out of bed. Amb to restroom and was able to cleanse her nicolle area with CGA. She amb in room for 20', performs R LE exs and stays up in recliner. Tor Certain arrives as PT is leaving.     This section established at most recent assessment   PROBLEM LIST (Impairments causing functional limitations):  Decreased Strength  Decreased ADL/Functional Activities  Decreased Transfer Abilities  Decreased Ambulation Ability/Technique  Decreased Balance  Increased Pain  Decreased Activity Tolerance  Increased Fatigue  Decreased Flexibility/Joint Mobility  Decreased Knowledge of Precautions  Decreased [place] with Home Exercise Program  Decreased strength   INTERVENTIONS PLANNED: (Benefits and precautions of physical therapy have been discussed with the patient.)  GENARO education   bed mobility  gait training  home exercise program (HEP)  Range of Motion: active/assisted/passive  Therapeutic Activities  therapeutic exercise/strengthening  transfer training  Group Therapy     TREATMENT PLAN: Frequency/Duration: Follow patient BID for duration of hospital stay to address above goals. Rehabilitation Potential For Stated Goals: Good     RECOMMENDED REHABILITATION/EQUIPMENT: (at time of discharge pending progress): Continue Skilled Therapy and Home Health: Physical Therapy. HISTORY:   History of Present Injury/Illness (Reason for Referral):  S/P R GENARO  Past Medical History/Comorbidities:   Ms. Kaylene Daniels  has a past medical history of ADD (attention deficit disorder), Arthritis, Depression, Essential hypertension (01/05/2017), Interstitial lung disease due to connective tissue disease ([address] Menopause, Mitral valve prolapse, Sciatic nerve disease, right, SLE (systemic lupus erythematosus) ([address] (09/18/2017), and Unspecified sleep apnea.  She also has no past medical history of Aneurysm ([address] Arrhythmia, Asthma, CAD (coronary artery disease), Cancer ([address] Chronic kidney disease, Chronic obstructive pulmonary disease ([address] Chronic pain, Coagulation disorder ([address] COPD, Diabetes ([address] Difficult intubation, Endocarditis, GERD (gastroesophageal reflux disease), Heart failure ([address] Liver disease, Malignant hyperthermia due to anesthesia, Morbid obesity ([address] Nausea & vomiting, Nicotine vapor product user, Non-nicotine vapor product user, Pseudocholinesterase deficiency, PUD (peptic ulcer disease), Rheumatic fever, Seizures ([facility] Stroke ([facility] Thromboembolus ([facility] or Thyroid disease. Ms. Glenn Solorzano  has a past surgical history that includes hx lumbar laminectomy (2009); hx colonoscopy (08/20/2013); hx tonsillectomy (1953); and hx dilation and curettage (1985). Social History/Living Environment:   Home Environment: Private residence  # Steps to Enter: 3  Hand Rails : Bilateral  One/Two Story Residence: One story  Living Alone: No  Support Systems: Spouse/Significant Other/Partner  Patient Expects to be Discharged to[de-identified] Private residence  Current DME Used/Available at Home: [employer] or Shower Type: Shower  Prior Level of Function/Work/Activity:  Functionally I with ADls and amb   Number of Personal Factors/Comorbidities that affect the Plan of Care: 1-2: MODERATE COMPLEXITY   EXAMINATION:   Most Recent Physical Functioning:      Gross Assessment  AROM: Within functional limits(L LE)  Strength: Generally decreased, functional(L LE 3+)  Sensation: Impaired(L LE spinal still intact)        RLE AROM  R Hip Flexion: 70  R Hip ABduction: 15       RLE Strength  R Hip Flexion: 2  R Hip ABduction: 2  R Knee Extension: 2+  R Ankle Dorsiflexion: 2+    Bed Mobility  Supine to Sit: Minimum assistance    Transfers  Sit to Stand: Minimum assistance  Stand to Sit: Minimum assistance  Stand Pivot Transfers: Minimum assistance  Bed to Chair: Minimum assistance  Toilet Transfers : Minimum assistance    Balance  Sitting: Intact  Standing: Pull to stand; With support              Weight Bearing Status  Right Side Weight Bearing: As tolerated  Distance (ft): 20 Feet (ft)  Ambulation - Level of Assistance: Minimal assistance  Assistive Device: Walker, rolling  Speed/Susie: Pace decreased (<100 feet/min)  Step Length: Left shortened  Stance: Right decreased  Gait Abnormalities: Antalgic; Step to gait  Interventions: Safety awareness training;Verbal cues; Visual/Demos     Braces/Orthotics:            Body Structures Involved:  Bones  Joints  Muscles Body Functions Affected:  Sensory/Pain  Neuromusculoskeletal  Movement Related Activities and Participation Affected:  General Tasks and Demands  Mobility  Self Care   Number of elements that affect the Plan of Care: 4+: HIGH COMPLEXITY   CLINICAL PRESENTATION:   Presentation: Stable and uncomplicated: LOW COMPLEXITY   CLINICAL DECISION MAKIN[address] [place] [address] AM-PAC 6 Clicks   Basic Mobility Inpatient Short Form  How much difficulty does the patient currently have. .. Unable A Lot A Little None   1. Turning over in bed (including adjusting bedclothes, sheets and blankets)? [] 1   [] 2   [x] 3   [] 4   2. Sitting down on and standing up from a chair with arms ( e.g., wheelchair, bedside commode, etc.)   [] 1   [] 2   [x] 3   [] 4   3. Moving from lying on back to sitting on the side of the bed? [] 1   [] 2   [x] 3   [] 4   How much help from another person does the patient currently need. .. Total A Lot A Little None   4. Moving to and from a bed to a chair (including a wheelchair)? [] 1   [] 2   [x] 3   [] 4   5. Need to walk in hospital room? [] 1   [] 2   [x] 3   [] 4   6. Climbing 3-5 steps with a railing? [] 1   [x] 2   [] 3   [] 4   © , Trustees of [address] [place] [address], under license to [employer]. All rights reserved     Score:  Initial: 17 Most Recent: X (Date: -- )    Interpretation of Tool:  Represents activities that are increasingly more difficult (i.e. Bed mobility, Transfers, Gait). Medical Necessity:     Patient demonstrates   good   rehab potential due to higher previous functional level. Reason for Services/Other Comments:  Patient   continues to require present interventions due to patient's inability to perform functional mobility at a CGA level safely   .    Use of outcome tool(s) and clinical judgement create a POC that gives a: Clear prediction of patient's progress: LOW COMPLEXITY            TREATMENT:   (In addition to Assessment/Re-Assessment sessions the following treatments were rendered)     Pre-treatment Symptoms/Complaints:  stress incontinence  Pain Initial:   Pain Intensity 1: 3  Pain Location 1: Hip  Pain Orientation 1: Right  Pain Intervention(s) 1: Ambulation/Increased Activity, Elevation, Exercise, Repositioned  Post Session:  2, LEs elevated     Therapeutic Exercise: (10 Minutes):  Exercises per grid below to improve mobility, strength, and coordination. Required minimal visual, verbal, and tactile cues to promote proper body alignment. Progressed range, repetitions, and complexity of movement as indicated. Assessment: 5 minutes   Date:  2/18/20 Date:   Date:     ACTIVITY/EXERCISE AM PM AM PM AM PM   GROUP THERAPY  []  []  []  []  []  []   Ankle Pumps  10       Quad Sets  10       Gluteal Sets  10       Hip ABd/ADduction  10AA       Straight Leg Raises         Knee Slides  10AA       Short Arc Quads  10       Long Arc Quads         Chair Slides                  B = bilateral; AA = active assistive; A = active; P = passive      Treatment/Session Assessment:     Response to Treatment:  did fairly well. Still a little numb in LEs    Education:  [x] Home Exercises  [x] Fall Precautions  [x] Hip Precautions [] D/C Instruction Review  [] Hip Prosthesis Review  [x] Walker Management/Safety [] Adaptive Equipment as Needed       Interdisciplinary Collaboration:   Physical Therapist  Occupational Therapist  Registered Nurse    After treatment position/precautions:   Up in chair  Bed/Chair-wheels locked  Call light within reach  RN notified  Family at bedside    Compliance with Program/Exercises: Will assess as treatment progresses.     Recommendations/Intent for next treatment session:  Treatment next visit will focus on increasing Ms. Wilder Craw independence with bed mobility, transfers, gait training, strength/ROM exercises, modalities for pain, and patient education.       Total Treatment Duration:  PT Patient Time In/Time Out  Time In: 4245  Time Out: 71 Annie Underwood

## 2020-02-19 VITALS
WEIGHT: 144.13 LBS | TEMPERATURE: 98 F | DIASTOLIC BLOOD PRESSURE: 72 MMHG | SYSTOLIC BLOOD PRESSURE: 146 MMHG | HEART RATE: 79 BPM | HEIGHT: 68 IN | OXYGEN SATURATION: 96 % | RESPIRATION RATE: 18 BRPM | BODY MASS INDEX: 21.85 KG/M2

## 2020-02-19 PROBLEM — Z96.649 S/P TOTAL HIP ARTHROPLASTY: Status: ACTIVE | Noted: 2020-02-19

## 2020-02-19 PROCEDURE — 97535 SELF CARE MNGMENT TRAINING: CPT

## 2020-02-19 PROCEDURE — 97116 GAIT TRAINING THERAPY: CPT

## 2020-02-19 PROCEDURE — 97150 GROUP THERAPEUTIC PROCEDURES: CPT

## 2020-02-19 PROCEDURE — 74011250637 HC RX REV CODE- 250/637: Performed by: ORTHOPAEDIC SURGERY

## 2020-02-19 PROCEDURE — 97110 THERAPEUTIC EXERCISES: CPT

## 2020-02-19 PROCEDURE — 74011250636 HC RX REV CODE- 250/636: Performed by: ORTHOPAEDIC SURGERY

## 2020-02-19 RX ORDER — ASPIRIN 81 MG/1
81 TABLET ORAL EVERY 12 HOURS
Qty: 60 TAB | Refills: 0 | Status: SHIPPED | OUTPATIENT
Start: 2020-02-19 | End: 2020-03-20

## 2020-02-19 RX ORDER — OXYCODONE HYDROCHLORIDE 5 MG/1
5-10 TABLET ORAL
Qty: 60 TAB | Refills: 0 | Status: SHIPPED | OUTPATIENT
Start: 2020-02-19 | End: 2020-02-26

## 2020-02-19 RX ADMIN — OXYCODONE 10 MG: 5 TABLET ORAL at 12:17

## 2020-02-19 RX ADMIN — Medication 2 G: at 05:51

## 2020-02-19 RX ADMIN — Medication 1 AMPULE: at 08:38

## 2020-02-19 RX ADMIN — CELECOXIB 200 MG: 200 CAPSULE ORAL at 08:39

## 2020-02-19 RX ADMIN — PANTOPRAZOLE SODIUM 40 MG: 40 TABLET, DELAYED RELEASE ORAL at 05:51

## 2020-02-19 RX ADMIN — OXYCODONE 10 MG: 5 TABLET ORAL at 05:51

## 2020-02-19 RX ADMIN — OXYCODONE 10 MG: 5 TABLET ORAL at 01:50

## 2020-02-19 RX ADMIN — ACETAMINOPHEN 1000 MG: 500 TABLET, FILM COATED ORAL at 05:51

## 2020-02-19 RX ADMIN — AMLODIPINE BESYLATE 5 MG: 5 TABLET ORAL at 08:39

## 2020-02-19 RX ADMIN — DEXTROAMPHETAMINE SACCHARATE, AMPHETAMINE ASPARTATE, DEXTROAMPHETAMINE SULFATE AND AMPHETAMINE SULFATE 30 MG: 2.5; 2.5; 2.5; 2.5 TABLET ORAL at 08:39

## 2020-02-19 NOTE — PHYSICIAN ADVISORY
Short Stay Review Pt Name:  Cheryle Bryant MR#  [id number] [facility]#   [id number] Room and Hospital  315/01  @ [address] [place] [address] Hospitalization date  2/18/2020 10:16 AM 
No discharge date for patient encounter. Current Attending Physician  Gera Bustamante MD  
 
A discharge order has been placed for this episode of hospital care for Ms. Cheryle Bryant; since this hospital stay is less than two midnights, I reviewed MsMelchor Anna Carias's chart. Ms. Katie Young healthcare insurance/benefit include: 
[place] / [place] / Product Type: Medicare /  
 
Utilization Review related case summary:  
Age  68 y.o.  
BMI  Body mass index is 22.24 kg/m². Functional status ASA Class  2 PMHx includes  Mitral valve disease, post menopausal syndrome, HTN, Hyperlipidemia, Dyspepsia, Lupus, sciatica EKG  NSR Echo Hospital course  Uncomplicated TKA PT OT evaluation  Pending Other Social/logistical issues Risk of deterioration at the time this patient was hospitalized   Moderate On the basis of chart review, this patient's hospitalization status     
is appropriate for INPATIENT The information in this document is a recommendation to be used for utilization review and utilization management purposes only. This recommendation is not an order. The recommendation is made based on the information reviewed at the time of the referral, is pursuant to the [place] [facility] Conditions of Participation (42 CFR Part 482), and is neither a judgment nor an assessment with regard to the appropriateness or quality of clinical care. For all Managed Care patients:  The Criteria are intended solely for use as screening guidelines with respect to the medical appropriateness of healthcare services and not for final clinical or payment determinations concerning the type or level of medical care provided, or proposed to be provided, to a patient. They help the reviewers determine whether a patient is appropriate for observation or inpatient admission at the time a decision to admit the patient is being made. All efforts are made to apply the pertinent payor criteria ([employer] or [employer]) as well as the clinical judgements based on the information reviewed at the time of the referral.\" Nothing in this document may be used to limit, alter, or affect clinical services provided to the patient named below. Akosua Nelson MD MPH FACP Cell: [phone] Physician Advisor Adan  [address]  
Utilization Review, Care Management CSN:  [id number] EDWARD:   [id number] Admitted on :  2/18/2020 Discharge order

## 2020-02-19 NOTE — PROGRESS NOTES
Orthopedic Joint Progress Note    2020  Admit Date: 2020  Admit Diagnosis: Primary localized osteoarthrosis of right hip [M16.11]  Arthritis of right hip [M16.11]    1 Day Post-Op    Subjective:     Vinod Glass awake and alert    Review of Systems: Pertinent items are noted in HPI. Objective:     PT/OT:     PATIENT MOBILITY    Bed Mobility  Supine to Sit: Minimum assistance  Transfers  Sit to Stand: Minimum assistance  Stand to Sit: Minimum assistance  Stand Pivot Transfers: Minimum assistance  Bed to Chair: Minimum assistance  Toilet Transfers : Minimum assistance      Gait  Speed/Susie: Pace decreased (<100 feet/min)  Step Length: Left shortened  Stance: Right decreased  Gait Abnormalities: Antalgic, Step to gait  Ambulation - Level of Assistance: Minimal assistance  Distance (ft): 20 Feet (ft)  Assistive Device: Walker, rolling  Interventions: Safety awareness training, Verbal cues, Visual/Demos   Weight Bearing Status  Right Side Weight Bearing: As tolerated        Vital Signs:    Blood pressure 131/78, pulse 82, temperature 97.8 °F (36.6 °C), resp. rate 16, height 5' 7.5\" (1.715 m), weight 65.4 kg (144 lb 2 oz), SpO2 95 %.   Temp (24hrs), Av.9 °F (36.6 °C), Min:97.4 °F (36.3 °C), Max:98.4 °F (36.9 °C)      Pain Control:   Pain Assessment  Pain Scale 1: Numeric (0 - 10)  Pain Intensity 1: 4  Pain Location 1: Hip  Pain Orientation 1: Right  Pain Description 1: Aching  Pain Intervention(s) 1: Medication (see MAR)    Meds:  Current Facility-Administered Medications   Medication Dose Route Frequency    amitriptyline (ELAVIL) tablet 10 mg  10 mg Oral QHS    amLODIPine (NORVASC) tablet 5 mg  5 mg Oral DAILY    dextroamphetamine-amphetamine (ADDERALL) tablet 30 mg  30 mg Oral DAILY    pantoprazole (PROTONIX) tablet 40 mg  40 mg Oral ACB    rosuvastatin (CRESTOR) tablet 10 mg  10 mg Oral QHS    sertraline (ZOLOFT) tablet 100 mg  100 mg Oral QHS    alcohol 62% (NOZIN) nasal  1 Ampule  1 Ampule Topical Q12H    0.9% sodium chloride infusion  100 mL/hr IntraVENous CONTINUOUS    sodium chloride (NS) flush 5-40 mL  5-40 mL IntraVENous PRN    acetaminophen (TYLENOL) tablet 1,000 mg  1,000 mg Oral Q6H    celecoxib (CELEBREX) capsule 200 mg  200 mg Oral Q12H    oxyCODONE IR (ROXICODONE) tablet 5-10 mg  5-10 mg Oral Q4H PRN    HYDROmorphone (PF) (DILAUDID) injection 1 mg  1 mg IntraVENous Q3H PRN    naloxone (NARCAN) injection 0.2-0.4 mg  0.2-0.4 mg IntraVENous Q10MIN PRN    dexamethasone (DECADRON) injection 10 mg  10 mg IntraVENous ONCE    promethazine (PHENERGAN) tablet 25 mg  25 mg Oral Q6H PRN    diphenhydrAMINE (BENADRYL) capsule 25 mg  25 mg Oral Q4H PRN    senna-docusate (PERICOLACE) 8.6-50 mg per tablet 2 Tab  2 Tab Oral DAILY    aspirin delayed-release tablet 81 mg  81 mg Oral Q12H    ondansetron (ZOFRAN ODT) tablet 8 mg  8 mg Oral Q8H PRN        LAB:    Lab Results   Component Value Date/Time    INR 0.9 01/28/2020 10:45 AM     Lab Results   Component Value Date/Time    HGB 10.6 (L) 02/18/2020 08:03 PM    HGB 12.6 01/28/2020 10:45 AM    HGB 12.1 12/17/2019 11:54 AM       Wound Hip Right (Active)   Dressing Status Clean, dry, and intact 2/18/2020  8:30 PM   Dressing Type Aquacel 2/18/2020  8:30 PM   Number of days: 1         Physical Exam:  Calves soft/ neuro intact      Assessment:      Principal Problem:    S/P total hip arthroplasty (2/19/2020)    Active Problems:    Arthritis of right hip (2/18/2020)         Plan:     Continue PT/OT/Rehab  Consult: Rehab team including PT, OT, recreational therapy, and    Home soon    Patient Expects to be Discharged to[de-identified] Private residence

## 2020-02-19 NOTE — PROGRESS NOTES
Pt in bed with family members present. Alert and Oriented x3. Denies pain. No NV deficits noted. +2 pedal pulses. Strong push/pulls. Surgical bandage clean, dry and intact. Bed in low and locked position. Call light within reach.

## 2020-02-19 NOTE — PROGRESS NOTES
Problem: Mobility Impaired (Adult and Pediatric)  Goal: *Acute Goals and Plan of Care (Insert Text)  Note:   GOALS (1-4 days):  (1.)Ms. Franco Parsons will move from supine to sit and sit to supine  in bed with CONTACT GUARD ASSIST.    (2.)Ms. Franco Parsons will transfer from bed to chair and chair to bed with SUPERVISION using the least restrictive device. (3.)Ms. Franco Parsons will ambulate with SUPERVISION for 250 feet with the least restrictive device. (4.)Ms. Franco Parsons will ambulate up/down 4 steps with bilateral  railing with CONTACT GUARD ASSIST with no device. (5.)Ms. Franco Parsons will state/observe GENARO precautions with 0 verbal cues. ________________________________________________________________________________________________      PHYSICAL THERAPY JOINT CAMP GENARO: Daily Note and AM 2/19/2020  INPATIENT: Hospital Day: 2  [place] / [place] / Product Type: Medicare /      NAME/AGE/GENDER: Leatrice Canavan is a 68 y.o. female   PRIMARY DIAGNOSIS:  Primary localized osteoarthrosis of right hip [M16.11]   Procedure(s) and Anesthesia Type:     * RIGHT TOTAL HIP ARTHROPLASTY - Spinal (Right)  ICD-10: Treatment Diagnosis:    · Pain in Right Hip (M25.551)  · Stiffness of Right Hip, Not elsewhere classified (M25.651)  · Other abnormalities of gait and mobility (R26.89)      ASSESSMENT:     Ms. Franco Parsons presents S/P R GENARO and demonstrates a decline in her premorbid level of function. She would benefit from further PT while here to address these deficits: decreased R LE strength,ROM,sensation , standing balance, functional mobility and GENARO awareness. She plans on going home at [place] with her spouse's assist.   2/19/20 AM:  Pt sitting up in chair on arrival. She is agreeable to PT and plans to go home today with HHPT. She did well with mobility and exercises. This section established at most recent assessment   PROBLEM LIST (Impairments causing functional limitations):  1. Decreased Strength  2.  Decreased ADL/Functional Activities  3. Decreased Transfer Abilities  4. Decreased Ambulation Ability/Technique  5. Decreased Balance  6. Increased Pain  7. Decreased Activity Tolerance  8. Increased Fatigue  9. Decreased Flexibility/Joint Mobility  10. Decreased Knowledge of Precautions  11. Decreased [place] with Home Exercise Program  12. Decreased strength   INTERVENTIONS PLANNED: (Benefits and precautions of physical therapy have been discussed with the patient.)  1. GENARO education   2. bed mobility  3. gait training  4. home exercise program (HEP)  5. Range of Motion: active/assisted/passive  6. Therapeutic Activities  7. therapeutic exercise/strengthening  8. transfer training  9. Group Therapy     TREATMENT PLAN: Frequency/Duration: Follow patient BID for duration of hospital stay to address above goals. Rehabilitation Potential For Stated Goals: Good     RECOMMENDED REHABILITATION/EQUIPMENT: (at time of discharge pending progress): Continue Skilled Therapy and Home Health: Physical Therapy. HISTORY:   History of Present Injury/Illness (Reason for Referral):  S/P R GENARO  Past Medical History/Comorbidities:   Ms. Jhoana Romero  has a past medical history of ADD (attention deficit disorder), Arthritis, Depression, Essential hypertension (01/05/2017), Interstitial lung disease due to connective tissue disease ([address] Menopause, Mitral valve prolapse, Sciatic nerve disease, right, SLE (systemic lupus erythematosus) ([address] (09/18/2017), and Unspecified sleep apnea.  She also has no past medical history of Aneurysm ([address] Arrhythmia, Asthma, CAD (coronary artery disease), Cancer ([address] Chronic kidney disease, Chronic obstructive pulmonary disease ([address] Chronic pain, Coagulation disorder ([address] COPD, Diabetes ([address] Difficult intubation, Endocarditis, GERD (gastroesophageal reflux disease), Heart failure ([address] Liver disease, Malignant hyperthermia due to anesthesia, Morbid obesity ([address] Nausea & vomiting, Nicotine vapor product user, Non-nicotine vapor product user, Pseudocholinesterase deficiency, PUD (peptic ulcer disease), Rheumatic fever, Seizures ([facility] Stroke ([facility] Thromboembolus ([facility] or Thyroid disease. Ms. Johnson Jenkins  has a past surgical history that includes hx lumbar laminectomy (2009); hx colonoscopy (08/20/2013); hx tonsillectomy (1953); and hx dilation and curettage (1985). Social History/Living Environment:   Home Environment: Private residence  # Steps to Enter: 3  Hand Rails : Bilateral  One/Two Story Residence: One story  Living Alone: No  Support Systems: Spouse/Significant Other/Partner  Patient Expects to be Discharged to[de-identified] Private residence  Current DME Used/Available at Home: .[employer] or Shower Type: Shower  Prior Level of Function/Work/Activity:  Functionally I with ADls and amb   Number of Personal Factors/Comorbidities that affect the Plan of Care: 1-2: MODERATE COMPLEXITY   EXAMINATION:   Most Recent Physical Functioning:                            Bed Mobility  Supine to Sit: Contact guard assistance    Transfers  Sit to Stand: Stand-by assistance  Stand to Sit: Stand-by assistance  Bed to Chair: Stand-by assistance    Balance  Sitting: Intact  Standing: With support; Without support              Weight Bearing Status  Right Side Weight Bearing: As tolerated  Distance (ft): 120 Feet (ft)  Ambulation - Level of Assistance: Stand-by assistance  Assistive Device: Walker, rolling  Speed/Susie: Delayed;Pace decreased (<100 feet/min)  Step Length: Left shortened;Right shortened  Stance: Right decreased  Gait Abnormalities: Antalgic;Decreased step clearance  Interventions: Safety awareness training;Verbal cues     Braces/Orthotics:     Right Hip Cold  Type: Cold/ice packs      Body Structures Involved:  1. Bones  2. Joints  3. Muscles Body Functions Affected:  1. Sensory/Pain  2. Neuromusculoskeletal  3. Movement Related Activities and Participation Affected:  1. General Tasks and Demands  2. Mobility  3.  Self Care Number of elements that affect the Plan of Care: 4+: HIGH COMPLEXITY   CLINICAL PRESENTATION:   Presentation: Stable and uncomplicated: LOW COMPLEXITY   CLINICAL DECISION MAKIN [facility] [address] [address] AM-PAC 6 Clicks   Basic Mobility Inpatient Short Form  How much difficulty does the patient currently have. .. Unable A Lot A Little None   1. Turning over in bed (including adjusting bedclothes, sheets and blankets)? [] 1   [] 2   [x] 3   [] 4   2. Sitting down on and standing up from a chair with arms ( e.g., wheelchair, bedside commode, etc.)   [] 1   [] 2   [x] 3   [] 4   3. Moving from lying on back to sitting on the side of the bed? [] 1   [] 2   [x] 3   [] 4   How much help from another person does the patient currently need. .. Total A Lot A Little None   4. Moving to and from a bed to a chair (including a wheelchair)? [] 1   [] 2   [x] 3   [] 4   5. Need to walk in hospital room? [] 1   [] 2   [x] 3   [] 4   6. Climbing 3-5 steps with a railing? [] 1   [x] 2   [] 3   [] 4   © , Trustees of [facility] [address] [address], under license to [employer]. All rights reserved     Score:  Initial: 17 Most Recent: X (Date: -- )    Interpretation of Tool:  Represents activities that are increasingly more difficult (i.e. Bed mobility, Transfers, Gait). Medical Necessity:     · Patient demonstrates   · good  ·  rehab potential due to higher previous functional level. Reason for Services/Other Comments:  · Patient   · continues to require present interventions due to patient's inability to perform functional mobility at a CGA level safely   · .    Use of outcome tool(s) and clinical judgement create a POC that gives a: Clear prediction of patient's progress: LOW COMPLEXITY            TREATMENT:   (In addition to Assessment/Re-Assessment sessions the following treatments were rendered)     Pre-treatment Symptoms/Complaints:  Minimal complaints of pain  Pain Initial:      Post Session:  2, LEs elevated     Gait Training (15 Minutes):  Gait training to improve and/or restore physical functioning as related to mobility, strength and balance. Ambulated 120 Feet (ft) with Stand-by assistance using a Walker, rolling and minimal Safety awareness training;Verbal cues related to their stance phase and stride length to promote proper body alignment, promote proper body posture and promote proper body mechanics. Therapeutic Exercise: (10 Minutes):  Exercises per grid below to improve mobility, strength, and coordination. Required minimal visual, verbal, and tactile cues to promote proper body alignment. Progressed range, repetitions, and complexity of movement as indicated. Date:  2/18/20 Date:  2/19/20 Date:     ACTIVITY/EXERCISE AM PM AM PM AM PM   GROUP THERAPY  []  []  []  []  []  []   Ankle Pumps  10 15      Quad Sets  10 15      Gluteal Sets  10 15      Hip ABd/ADduction  10AA 15      Straight Leg Raises         Knee Slides  10AA 15      Short Arc Quads  10       Long Arc Quads   15      Chair Slides                  B = bilateral; AA = active assistive; A = active; P = passive      Treatment/Session Assessment:     Response to Treatment:  Pt doing well with mobility and exercises. Education:  [x] Home Exercises  [x] Fall Precautions  [x] Hip Precautions [] D/C Instruction Review  [x] Hip Prosthesis Review  [x] Walker Management/Safety [] Adaptive Equipment as Needed       Interdisciplinary Collaboration:   o Physical Therapy Assistant  o Registered Nurse    After treatment position/precautions:   o Up in chair  o Bed/Chair-wheels locked  o Caregiver at bedside  o Call light within reach  o RN notified    Compliance with Program/Exercises: Will assess as treatment progresses. Recommendations/Intent for next treatment session:  Treatment next visit will focus on increasing Ms. Mendel Niece independence with bed mobility, transfers, gait training, strength/ROM exercises, modalities for pain, and patient education. Total Treatment Duration:  PT Patient Time In/Time Out  Time In: 0910  Time Out: 0935    Salas Uriarte PTA

## 2020-02-19 NOTE — DISCHARGE SUMMARY
[address] [place] [address]  Total Joint Discharge Summary      Patient ID:  Yoshi Mccray  [id number]  42 y.o.  1950    Admit date: 2/18/2020  Discharge date and time: 2/19/20  Admitting Physician: Jessica Parker MD  Surgeon: Same  Admission Diagnoses: Primary localized osteoarthrosis of right hip [M16.11]  Arthritis of right hip [M16.11]  Discharge Diagnoses: Principal Problem:    S/P total hip arthroplasty (2/19/2020)    Active Problems:    Arthritis of right hip (2/18/2020)                                Perioperative Antibiotics: Ancef 1 to 2 mg was given depending on patient's weight. If allergic to Ancef or due to other indications, patient was given Vancomycin. Hospital Medications given:   [unfilled]  [unfilled]  [unfilled]    Discharge Medications given:  Current Discharge Medication List      START taking these medications    Details   oxyCODONE IR (ROXICODONE) 5 mg immediate release tablet Take 1-2 Tabs by mouth every four (4) hours as needed for Pain for up to 7 days. Max Daily Amount: 60 mg.  Qty: 60 Tab, Refills: 0    Associated Diagnoses: Status post total replacement of right hip         CONTINUE these medications which have CHANGED    Details   aspirin delayed-release 81 mg tablet Take 1 Tab by mouth every twelve (12) hours every twelve (12) hours for 30 days. Qty: 60 Tab, Refills: 0         CONTINUE these medications which have NOT CHANGED    Details   acetaminophen/diphenhydramine (TYLENOL PM PO) Take  by mouth nightly. hydroxychloroquine (PLAQUENIL) 200 mg tablet Take 1 pill once a day after food. Qty: 30 Tab, Refills: 3    Associated Diagnoses: Systemic lupus erythematosus, unspecified SLE type, unspecified organ involvement status (HCC)      amLODIPine (NORVASC) 5 mg tablet Take 1 Tab by mouth daily. Qty: 90 Tab, Refills: 1    Associated Diagnoses: Essential hypertension      amitriptyline (ELAVIL) 10 mg tablet Take 1 Tab by mouth daily.   Qty: 90 Tab, Refills: 3 Associated Diagnoses: Arthritis      sertraline (ZOLOFT) 100 mg tablet Take 1 Tab by mouth daily. Qty: 90 Tab, Refills: 1    Associated Diagnoses: Other depression      rosuvastatin (CRESTOR) 10 mg tablet Take 1 Tab by mouth nightly. Qty: 90 Tab, Refills: 1    Associated Diagnoses: Hypercholesterolemia      omeprazole (PRILOSEC) 20 mg capsule Take 1 Cap by mouth two (2) times a day. Qty: 180 Cap, Refills: 1      !! dextroamphetamine-amphetamine (ADDERALL) 30 mg tablet Take 1 Tab by mouth dailyEarliest Fill Date: 12/19/18. Max Daily Amount: 1 Tab  Qty: 30 Tab, Refills: 0    Associated Diagnoses: Attention deficit disorder, unspecified hyperactivity presence      acyclovir (ZOVIRAX) 400 mg tablet Take 1 Tab by mouth three (3) times daily. Qty: 90 Tab, Refills: 11    Associated Diagnoses: Herpes labialis      alclometasone (ACLOVATE) 0.05 % topical cream Apply  to affected area two (2) times a day. apply thin layer as directed  Qty: 15 g, Refills: 0    Associated Diagnoses: Dermatitis      nystatin (MYCOSTATIN) topical cream Apply  to affected area two (2) times a day. Qty: 15 g, Refills: 11      hydrocortisone (CORTAID) 1 % topical cream Apply  to affected area two (2) times a day. use thin layer  Qty: 30 g, Refills: 11      conjugated estrogens (PREMARIN) 0.625 mg/gram vaginal cream Insert 0.5 g into vagina two (2) days a week. Qty: 1 Each, Refills: 11      ferrous sulfate (IRON) 325 mg (65 mg iron) tablet Take  by mouth Daily (before breakfast). cholecalciferol (VITAMIN D3) 400 unit tab tablet Take  by mouth daily. b complex vitamins (B COMPLEX 1) tablet Take 1 Tab by mouth daily. MV,[place],MIN/IRON/FOLIC ACID/LUT (COMPLETE MULTI PO) Take  by mouth. cpap machine kit by Does Not Apply route. acetaminophen (TYLENOL) 325 mg tablet Take  by mouth every four (4) hours as needed for Pain. CYANOCOBALAMIN (VITAMIN B-12 PO) take 1 Tab by mouth daily.  Vitamin b 12 complex       CALCIUM CARBONATE/VITAMIN D3 (CALCIUM 600 + D PO) take 1 Tab by mouth daily. ASCORBIC ACID (VITAMIN C PO) take 400 mg by mouth daily. DOCOSAHEXANOIC ACID/EPA (FISH OIL PO) Take 1,200 mg by mouth daily. vitamin E (AQUA GEMS) 400 unit capsule take 1 Cap by mouth daily. !! dextroamphetamine-amphetamine (ADDERALL) 30 mg tablet Take 1 Tab by mouth dailyEarliest Fill Date: 2/19/19. Max Daily Amount: 1 Tab  Qty: 30 Tab, Refills: 0    Associated Diagnoses: Attention deficit disorder, unspecified hyperactivity presence      !! dextroamphetamine-amphetamine (ADDERALL) 30 mg tablet Take 1 Tab by mouth dailyEarliest Fill Date: 1/19/19. Max Daily Amount: 1 Tab  Qty: 30 Tab, Refills: 0    Associated Diagnoses: Attention deficit disorder, unspecified hyperactivity presence       !! - Potential duplicate medications found. Please discuss with provider. STOP taking these medications       diclofenac EC (VOLTAREN) 75 mg EC tablet Comments:   Reason for Stopping:                Additional DVT Prophylaxis:  CHRIS Hose,Plexi-Pulse    Postoperative transfusions:   none  Post Op complications: none    Hemoglobin at discharge:   Lab Results   Component Value Date/Time    HGB 10.6 (L) 02/18/2020 08:03 PM       Wound appears to be healing without any evidence of infection. Physical Therapy started on the day following surgery and progressed to independent ambulation with the aid of a walker. At the time of discharge, able to go up and down stairs and had understanding of precautions needed following surgery.       PT/OT:            Assistive Device: Walker (comment)  RLE AROM  R Hip Flexion: 70  R Hip ABduction: 15             Discharged to: home    Discharge instructions:  -Rx pain medication given  - Anticoagulate with: Ecotrin 81 mg PO BID x 4 weeks  -Resume pre hospital diet             -Resume home medications per medical continuation form     -Ambulate with walker, appropriate total joint protocol  -Follow up in office as scheduled       Signed:  Sujey Rose MD  2/19/2020  7:41 AM

## 2020-02-19 NOTE — DISCHARGE INSTRUCTIONS
[facility]   Patient Discharge Instructions    Cheryle Bryant / [id number] : 1950    Admitted 2020 Discharged: 2020     IF YOU HAVE ANY PROBLEMS ONCE YOU ARE AT HOME CALL THE FOLLOWING NUMBERS:   Main office number: [phone]    Take Home Medications     · It is important that you take the medication exactly as they are prescribed. · Keep your medication in the bottles provided by the pharmacist and keep a list of the medication names, dosages, and times to be taken in your wallet. · Do not take other medications without consulting your doctor. What to do at [address] your prehospital diet. If you have excessive nausea or vomitting call your doctor's office     Home Physical Therapy is arranged. Use rolling walker when walking. Patients who have had a joint replacement should not drive until you are seen for your follow up appointment by Dr. Kade Still. When to Call    - Call if you have a temperature greater then 101  - Unable to keep food down  - Loose control of your bladder or bowel function  - Are unable to bear any weight   - Need a pain medication refill       DISCHARGE SUMMARY from Nurse    The following personal items collected during your admission are returned to you:   Dental Appliance: Dental Appliances: None  Vision: Visual Aid: Glasses  Hearing Aid:    Jewelry:    Clothing: Clothing: Ethel Fury  Other Valuables: Other Valuables: Cell Phone  Valuables sent to safe:      PATIENT INSTRUCTIONS:    After general anesthesia or intravenous sedation, for 24 hours or while taking prescription Narcotics:  · Limit your activities  · Do not drive and operate hazardous machinery  · Do not make important personal or business decisions  · Do  not drink alcoholic beverages  · If you have not urinated within 8 hours after discharge, please contact your surgeon on call.     Report the following to your surgeon:  · Excessive pain, swelling, redness or odor of or around the surgical area  · Temperature over 101  · Nausea and vomiting lasting longer than 4 hours or if unable to take medications  · Any signs of decreased circulation or nerve impairment to extremity: change in color, persistent  numbness, tingling, coldness or increase pain  · Any questions, call office @ [phone]      Keep scheduled follow up appointment. If need to change, call office @ [phone]. *  Please give a list of your current medications to your Primary Care Provider. *  Please update this list whenever your medications are discontinued, doses are      changed, or new medications (including over-the-counter products) are added. *  Please carry medication information at all times in case of emergency situations. Patient Education        Hip Replacement Surgery (Posterior): What to Expect at Home  Your Recovery  Hip replacement surgery replaces the worn parts of your hip joint. When you leave the hospital, you will probably be walking with crutches or a walker. You may be able to climb a few stairs and get in and out of bed and chairs. But you will need someone to help you at home for the next few weeks or until you have more energy and can move around better. If you need more extensive rehab, you may go to a specialized rehab center for more treatment. You will go home with a bandage and stitches, staples, tissue glue, or tape strips. You can remove the bandage when your doctor tells you to. If you have stitches or staples, your doctor will remove them 10 days to 3 weeks after your surgery. Glue or tape strips will fall off on their own over time. You may still have some mild pain, and the area may be swollen for 3 to 4 months after surgery. Your doctor will give you medicine for the pain. You will continue the rehabilitation program (rehab) you started in the hospital. The better you do with your rehab exercises, the sooner you will get your strength and movement back.  Most people are able to return to work 4 weeks to 4 months after surgery. This care sheet gives you a general idea about how long it will take for you to recover. But each person recovers at a different pace. Follow the steps below to get better as quickly as possible. How can you care for yourself at home? Activity    · Your doctor may not want your affected leg to cross the center of your body toward the other leg. If so, your therapist may suggest these ideas:  ? Do not cross your legs. ? Be very careful as you get in or out of bed or a car, so your leg does not cross that imaginary line in the middle of your body.     · Rest when you feel tired. You may take a nap, but do not stay in bed all day.     · Work with your physical therapist to learn the best way to exercise. You will probably have to use crutches or a walker for at least 4 to 6 weeks.     · Your doctor may advise you to stay away from activities that put stress on the joint. This includes sports such as tennis, football, and jogging.     · Try not to sit for too long at one time. You will feel less stiff if you take a short walk about every hour. When you sit, use chairs with arms, and do not sit in low chairs.     · Do not bend over more than 90 degrees (like the angle in a letter \"L\").     · Sleep on your back with your legs slightly apart or on your side with a pillow between your knees for about 6 weeks or as your doctor tells you. Do not sleep on your stomach or affected leg.     · Ask your doctor when you can drive again.     · Most people are able to return to work 4 weeks to 4 months after surgery.     · Ask your doctor when it is okay for you to have sex. Diet    · By the time you leave the hospital, you will probably be eating your normal diet. If your stomach is upset, try bland, low-fat foods like plain rice, broiled chicken, toast, and yogurt.  Your doctor may recommend that you take iron and vitamin supplements.     · Drink plenty of fluids (unless your doctor tells you not to).   · Eat healthy foods, and watch your portion sizes. Try to stay at your ideal weight. Too much weight puts more stress on your new hip joint.     · You may notice that your bowel movements are not regular right after your surgery. This is common. Try to avoid constipation and straining with bowel movements. You may want to take a fiber supplement every day. If you have not had a bowel movement after a couple of days, ask your doctor about taking a mild laxative. Medicines    · Your doctor will tell you if and when you can restart your medicines. He or she will also give you instructions about taking any new medicines.     · If you take blood thinners, such as warfarin (Coumadin), clopidogrel (Plavix), or aspirin, be sure to talk to your doctor. He or she will tell you if and when to start taking those medicines again. Make sure that you understand exactly what your doctor wants you to do.     · Your doctor may give you a blood-thinning medicine to prevent blood clots. If you take a blood thinner, be sure you get instructions about how to take your medicine safely. Blood thinners can cause serious bleeding problems. This medicine could be in pill form or as a shot (injection). If a shot is necessary, your doctor will tell you how to do this.     · Be safe with medicines. Take pain medicines exactly as directed. ? If the doctor gave you a prescription medicine for pain, take it as prescribed. ? If you are not taking a prescription pain medicine, ask your doctor if you can take an over-the-counter medicine.     · If you think your pain medicine is making you sick to your stomach:  ? Take your medicine after meals (unless your doctor has told you not to). ? Ask your doctor for a different pain medicine.     · If your doctor prescribed antibiotics, take them as directed. Do not stop taking them just because you feel better. You need to take the full course of antibiotics. Incision care    · If your doctor told you how to care for your cut (incision), follow your doctor's instructions. You will have a dressing over the cut. A dressing helps the incision heal and protects it. Your doctor will tell you how to take care of this.     · If you did not get instructions, follow this general advice:  ? If you have strips of tape on the cut the doctor made, leave the tape on for a week or until it falls off.  ? If you have stitches or staples, your doctor will tell you when to come back to have them removed. ? If you have skin adhesive on the cut, leave it on until it falls off. Skin adhesive is also called glue or liquid stitches. ? Change the bandage every day. ? Wash the area daily with warm water, and pat it dry. Don't use hydrogen peroxide or alcohol. They can slow healing. ? You may cover the area with a gauze bandage if it oozes fluid or rubs against clothing. ? You may shower 24 to 48 hours after surgery. Pat the incision dry. Don't swim or take a bath for the first 2 weeks, or until your doctor tells you it is okay. Exercise    · Your rehab program will include a number of exercises to do. Always do them as your therapist tells you. Ice and elevation    · For pain, put ice or a cold pack on the area for 10 to 20 minutes at a time. Put a thin cloth between the ice and your skin.     · Your ankle may swell for about 3 months. Prop up your ankle when you ice it or anytime you sit or lie down. Try to keep it above the level of your heart. This will help reduce swelling. Other instructions   Continue to wear your support stockings as your doctor says. These help to prevent blood clots. The length of time that you will have to wear them depends on your activity level and the amount of swelling you have. Most people wear these stockings for 4 to 6 weeks after surgery.   Preventing falls is also very important.  To prevent falls:    · Arrange furniture so that you will not trip on it.     · Get rid of throw rugs, and move electrical cords out of the way.     · Walk only in areas with plenty of light.     · Put grab bars in showers and bathtubs.     · Avoid icy or snowy sidewalks.     · Wear shoes with sturdy, flat soles. Follow-up care is a key part of your treatment and safety. Be sure to make and go to all appointments, and call your doctor if you are having problems. It's also a good idea to know your test results and keep a list of the medicines you take. When should you call for help? Call 911 anytime you think you may need emergency care. For example, call if:    · You passed out (lost consciousness).     · You have severe trouble breathing.     · You have sudden chest pain and shortness of breath, or you cough up blood.    Call your doctor now or seek immediate medical care if:    · You have signs that your hip may be dislocated, including:  ? Severe pain and not being able to stand. ? A crooked leg that looks like your hip is out of position. ? Not being able to bend or straighten your leg.     · Your leg or foot is cool or pale or changes color.     · You cannot feel or move your leg.     · You have signs of a blood clot, such as:  ? Pain in your calf, back of the knee, thigh, or groin. ? Redness and swelling in your leg or groin.     · Your incision comes open and begins to bleed, or the bleeding increases.     · You feel like your heart is racing or beating irregularly.     · You have signs of infection, such as:  ? Increased pain, swelling, warmth, or redness. ? Red streaks leading from the incision. ? Pus draining from the incision. ? A fever.    Watch closely for changes in your health, and be sure to contact your doctor if:    · You do not have a bowel movement after taking a laxative.     · You do not get better as expected. Where can you learn more? Go to http://nichole-gonzález.info/.   Enter K887 in the search box to learn more about \"Hip Replacement Surgery (Posterior): What to Expect at Home. \"  Current as of: June 26, 2019  Content Version: 12.2  © 9650-4549 [employer]. Care instructions adapted under license by [employer] (which disclaims liability or warranty for this information). If you have questions about a medical condition or this instruction, always ask your healthcare professional. [address] any warranty or liability for your use of this information. These are general instructions for a healthy lifestyle:    No smoking/ No tobacco products/ Avoid exposure to second hand smoke    Surgeon General's Warning:  Quitting smoking now greatly reduces serious risk to your health. Obesity, smoking, and sedentary lifestyle greatly increases your risk for illness    A healthy diet, regular physical exercise & weight monitoring are important for maintaining a healthy lifestyle    You may be retaining fluid if you have a history of heart failure or if you experience any of the following symptoms:  Weight gain of 3 pounds or more overnight or 5 pounds in a week, increased swelling in our hands or feet or shortness of breath while lying flat in bed. Please call your doctor as soon as you notice any of these symptoms; do not wait until your next office visit. Recognize signs and symptoms of STROKE:    F-face looks uneven    A-arms unable to move or move even    S-speech slurred or non-existent    T-time-call 911 as soon as signs and symptoms begin-DO NOT go       Back to bed or wait to see if you get better-TIME IS BRAIN. The discharge information has been reviewed with the patient. The patient verbalized understanding. Information obtained by :  I understand that if any problems occur once I am at home I am to contact my physician. I understand and acknowledge receipt of the instructions indicated above. Physician's or R.N.'s Signature                                                                  Date/Time                                                                                                                                              Patient or Representative Signature                                                          Date/Time

## 2020-02-19 NOTE — PROGRESS NOTES
Care Management Interventions  PCP Verified by CM: Yes  Mode of Transport at Discharge: Self  Transition of Care Consult (CM Consult): [address]: Yes  Discharge Durable Medical Equipment: No  Physical Therapy Consult: Yes  Current Support Network: Lives with Spouse  Confirm Follow Up Transport: Family  The Plan for Transition of Care is Related to the Following Treatment Goals : return to independent function  The Patient and/or Patient Representative was Provided with a Choice of Provider and Agrees with the Discharge Plan?: Yes  Freedom of Choice List was Provided with Basic Dialogue that Supports the Patient's Individualized Plan of Care/Goals, Treatment Preferences and Shares the Quality Data Associated with the Providers?: Yes  Discharge Location  Discharge Placement: Home with home health  Patient is a 68y.o. year old female admitted for Right GENARO . Patient plans to return home on discharge. Order received to arrange home health. Patient without preference towards agency. Referral sent to [facility]. Patient denies any equipment needs as patient has a walker and raised toilet seat. Will follow until discharge.

## 2020-02-19 NOTE — PROGRESS NOTES
Problem: Mobility Impaired (Adult and Pediatric)  Goal: *Acute Goals and Plan of Care (Insert Text)  Note:   GOALS (1-4 days):  (1.)Ms. Sohan Bourne will move from supine to sit and sit to supine  in bed with CONTACT GUARD ASSIST.    (2.)Ms. Sohan Bourne will transfer from bed to chair and chair to bed with SUPERVISION using the least restrictive device. (3.)Ms. Sohan Bourne will ambulate with SUPERVISION for 250 feet with the least restrictive device. (4.)Ms. Sohan Bourne will ambulate up/down 4 steps with bilateral  railing with CONTACT GUARD ASSIST with no device. (5.)Ms. Sohan Bourne will state/observe GENARO precautions with 0 verbal cues. ________________________________________________________________________________________________      PHYSICAL THERAPY JOINT CAMP GENARO: Daily Note and PM 2/19/2020  INPATIENT: Hospital Day: 2  [place] / [place] / Product Type: Medicare /      NAME/AGE/GENDER: Martha Valencia is a 68 y.o. female   PRIMARY DIAGNOSIS:  Primary localized osteoarthrosis of right hip [M16.11]   Procedure(s) and Anesthesia Type:     * RIGHT TOTAL HIP ARTHROPLASTY - Spinal (Right)  ICD-10: Treatment Diagnosis:    · Pain in Right Hip (M25.551)  · Stiffness of Right Hip, Not elsewhere classified (M25.651)  · Other abnormalities of gait and mobility (R26.89)      ASSESSMENT:     Ms. Sohan Bourne presents S/P R GENARO and demonstrates a decline in her premorbid level of function. She would benefit from further PT while here to address these deficits: decreased R LE strength,ROM,sensation , standing balance, functional mobility and GENARO awareness. She plans on going home at [place] with her spouse's assist.   2/19/20 PM:  Pt sitting up in chair on arrival. She is motivated and ready for group. She still plans to go home today with HHPT. This section established at most recent assessment   PROBLEM LIST (Impairments causing functional limitations):  1. Decreased Strength  2. Decreased ADL/Functional Activities  3.  Decreased Transfer Abilities  4. Decreased Ambulation Ability/Technique  5. Decreased Balance  6. Increased Pain  7. Decreased Activity Tolerance  8. Increased Fatigue  9. Decreased Flexibility/Joint Mobility  10. Decreased Knowledge of Precautions  11. Decreased [place] with Home Exercise Program  12. Decreased strength   INTERVENTIONS PLANNED: (Benefits and precautions of physical therapy have been discussed with the patient.)  1. GENARO education   2. bed mobility  3. gait training  4. home exercise program (HEP)  5. Range of Motion: active/assisted/passive  6. Therapeutic Activities  7. therapeutic exercise/strengthening  8. transfer training  9. Group Therapy     TREATMENT PLAN: Frequency/Duration: Follow patient BID for duration of hospital stay to address above goals. Rehabilitation Potential For Stated Goals: Good     RECOMMENDED REHABILITATION/EQUIPMENT: (at time of discharge pending progress): Continue Skilled Therapy and Home Health: Physical Therapy. HISTORY:   History of Present Injury/Illness (Reason for Referral):  S/P R GENARO  Past Medical History/Comorbidities:   Ms. Jhoana Romero  has a past medical history of ADD (attention deficit disorder), Arthritis, Depression, Essential hypertension (01/05/2017), Interstitial lung disease due to connective tissue disease ([address] Menopause, Mitral valve prolapse, Sciatic nerve disease, right, SLE (systemic lupus erythematosus) ([address] (09/18/2017), and Unspecified sleep apnea.  She also has no past medical history of Aneurysm ([address] Arrhythmia, Asthma, CAD (coronary artery disease), Cancer ([address] Chronic kidney disease, Chronic obstructive pulmonary disease ([address] Chronic pain, Coagulation disorder ([address] COPD, Diabetes ([address] Difficult intubation, Endocarditis, GERD (gastroesophageal reflux disease), Heart failure ([address] Liver disease, Malignant hyperthermia due to anesthesia, Morbid obesity ([address] Nausea & vomiting, Nicotine vapor product user, Non-nicotine vapor product user, Pseudocholinesterase deficiency, PUD (peptic ulcer disease), Rheumatic fever, Seizures ([facility] Stroke ([facility] Thromboembolus ([facility] [address] or Thyroid disease. Ms. Anthony Brennan  has a past surgical history that includes hx lumbar laminectomy (2009); hx colonoscopy (08/20/2013); hx tonsillectomy (1953); and hx dilation and curettage (1985). Social History/Living Environment:   Home Environment: Private residence  # Steps to Enter: 3  Hand Rails : Bilateral  One/Two Story Residence: One story  Living Alone: No  Support Systems: Spouse/Significant Other/Partner  Patient Expects to be Discharged to[de-identified] Private residence  Current DME Used/Available at Home: [employer] or Shower Type: Shower  Prior Level of Function/Work/Activity:  Functionally I with ADls and amb   Number of Personal Factors/Comorbidities that affect the Plan of Care: 1-2: MODERATE COMPLEXITY   EXAMINATION:   Most Recent Physical Functioning:                            Bed Mobility  Supine to Sit: Contact guard assistance    Transfers  Sit to Stand: Stand-by assistance  Stand to Sit: Stand-by assistance  Bed to Chair: Stand-by assistance    Balance  Sitting: Intact  Standing: With support; Without support              Weight Bearing Status  Right Side Weight Bearing: As tolerated  Distance (ft): 284 Feet (ft)(x2)  Ambulation - Level of Assistance: Stand-by assistance  Assistive Device: Walker, rolling  Speed/Susie: Delayed;Pace decreased (<100 feet/min)  Step Length: Left shortened;Right shortened  Stance: Right decreased  Gait Abnormalities: Antalgic;Decreased step clearance  Interventions: Safety awareness training;Verbal cues     Braces/Orthotics:     Right Hip Cold  Type: Cold/ice packs      Body Structures Involved:  1. Bones  2. Joints  3. Muscles Body Functions Affected:  1. Sensory/Pain  2. Neuromusculoskeletal  3. Movement Related Activities and Participation Affected:  1. General Tasks and Demands  2. Mobility  3.  Self Care   Number of elements that affect the Plan of Care: 4+: HIGH COMPLEXITY   CLINICAL PRESENTATION:   Presentation: Stable and uncomplicated: LOW COMPLEXITY   CLINICAL DECISION MAKIN [facility] [address] [address] AM-PAC 6 Clicks   Basic Mobility Inpatient Short Form  How much difficulty does the patient currently have. .. Unable A Lot A Little None   1. Turning over in bed (including adjusting bedclothes, sheets and blankets)? [] 1   [] 2   [x] 3   [] 4   2. Sitting down on and standing up from a chair with arms ( e.g., wheelchair, bedside commode, etc.)   [] 1   [] 2   [x] 3   [] 4   3. Moving from lying on back to sitting on the side of the bed? [] 1   [] 2   [x] 3   [] 4   How much help from another person does the patient currently need. .. Total A Lot A Little None   4. Moving to and from a bed to a chair (including a wheelchair)? [] 1   [] 2   [x] 3   [] 4   5. Need to walk in hospital room? [] 1   [] 2   [x] 3   [] 4   6. Climbing 3-5 steps with a railing? [] 1   [x] 2   [] 3   [] 4   © , Trustees of [facility] [address] [address], under license to [employer]. All rights reserved     Score:  Initial: 17 Most Recent: X (Date: -- )    Interpretation of Tool:  Represents activities that are increasingly more difficult (i.e. Bed mobility, Transfers, Gait). Medical Necessity:     · Patient demonstrates   · good  ·  rehab potential due to higher previous functional level. Reason for Services/Other Comments:  · Patient   · continues to require present interventions due to patient's inability to perform functional mobility at a CGA level safely   · .    Use of outcome tool(s) and clinical judgement create a POC that gives a: Clear prediction of patient's progress: LOW COMPLEXITY            TREATMENT:   (In addition to Assessment/Re-Assessment sessions the following treatments were rendered)     Pre-treatment Symptoms/Complaints:  Minimal complaints of pain  Pain Initial:  Not rated     Post Session:   Not rated     Gait Training (15 Minutes):  Gait training to improve and/or restore physical functioning as related to mobility, strength and balance. Ambulated 284 Feet (ft)(x2) with Stand-by assistance using a Walker, rolling and minimal Safety awareness training;Verbal cues related to their stance phase and stride length to promote proper body alignment, promote proper body posture and promote proper body mechanics. Therapeutic Exercise: (45 Minutes(group)):  Exercises per grid below to improve mobility, strength, and coordination. Required minimal visual, verbal, and tactile cues to promote proper body alignment. Progressed range, repetitions, and complexity of movement as indicated. Date:  2/18/20 Date:  2/19/20 Date:     ACTIVITY/EXERCISE AM PM AM PM AM PM   GROUP THERAPY  []  []  []  [x]  []  []   Ankle Pumps  10 15 15     Quad Sets  10 15 15     Gluteal Sets  10 15 15     Hip ABd/ADduction  10AA 15 15     Straight Leg Raises         Knee Slides  10AA 15 15     Short Arc Quads  10  15     Long Arc Quads   15 15     Chair Slides                  B = bilateral; AA = active assistive; A = active; P = passive      Treatment/Session Assessment:     Response to Treatment:  Continues to do well. Education:  [x] Home Exercises  [x] Fall Precautions  [x] Hip Precautions [] D/C Instruction Review  [x] Hip Prosthesis Review  [x] Walker Management/Safety [] Adaptive Equipment as Needed       Interdisciplinary Collaboration:   o Physical Therapy Assistant  o Registered Nurse    After treatment position/precautions:   o Up in chair  o Bed/Chair-wheels locked  o Caregiver at bedside  o Call light within reach  o RN notified    Compliance with Program/Exercises: Will assess as treatment progresses. Recommendations/Intent for next treatment session:  Treatment next visit will focus on increasing Ms. Antonio Crooks independence with bed mobility, transfers, gait training, strength/ROM exercises, modalities for pain, and patient education.       Total Treatment Duration:  PT Patient Time In/Time Out  Time In: 1300  Time Out: 1400 [facility]

## 2020-02-19 NOTE — PROGRESS NOTES
Problem: Self Care Deficits Care Plan (Adult)  Goal: *Acute Goals and Plan of Care (Insert Text)  Description  GOALS:   DISCHARGE GOALS (in preparation for going home/rehab):  3 days  1. Ms. Ryne Segura will perform one lower body dressing activity with minimal assistance with adaptive equipment to demonstrate improved functional mobility and safety. GOAL MET 2/19/2020    2. Ms. Ryne Segura will perform one lower body bathing activity with minimal  assistance with adaptive equipment to demonstrate improved functional mobility and safety. GOAL MET 2/19/2020  3. Ms. Ryne Segura will perform toileting/toilet transfer with contact guard assistance with adaptive equipment to demonstrate improved functional mobility and safety. GOAL MET 2/19/2020  4. Ms. Ryne Segura will perform shower transfer with contact guard assistance with adaptive equipment to demonstrate improved functional mobility and safety. GOAL MET 2/19/2020  5. Ms. Ryne Segura will state GENARO precautions with two verbal cues to demonstrate improved functional mobility and safety. GOAL MET 2/19/2020         JOINT CAMP OCCUPATIONAL THERAPY GENARO: Daily Note, Discharge, Treatment Day: 2nd and AM 2/19/2020  INPATIENT: Hospital Day: 2  [place] / Product Type: Medicare /      NAME/AGE/GENDER: Chelle Carl is a 68 y.o. female   PRIMARY DIAGNOSIS:  Primary localized osteoarthrosis of right hip [M16.11]   Procedure(s) and Anesthesia Type:     * RIGHT TOTAL HIP ARTHROPLASTY - Spinal (Right)  ICD-10: Treatment Diagnosis:    · Pain in Right Hip (M25.551)  · Stiffness of Right Hip, Not elsewhere classified (M25.651)      ASSESSMENT:      Ms. Ryne Segura is s/p right GENARO and presents with decreased weight bearing on right LE and decreased independence with functional mobility and activities of daily living.   Patient completed shower and dressing as charted below in ADL grid and is ambulating with rolling walker and CGA to stand by  assist.  Patient has met 5/5 goals and plans to return home with good family support. Family able to provide patient with appropriate level of assistance at this time. OT reviewed hip precautions throughout session. Patient instructed to call for assistance when needing to get up from recliner and all needs in reach. Patient verbalized understanding of call light. Issued long sponge, educated and trained in hip kit use. This section established at most recent assessment   PROBLEM LIST (Impairments causing functional limitations):  1. Decreased Strength  2. Decreased ADL/Functional Activities  3. Decreased Transfer Abilities  4. Increased Pain  5. Increased Fatigue  6. Decreased Flexibility/Joint Mobility  7. Decreased Knowledge of Precautions   INTERVENTIONS PLANNED: (Benefits and precautions of occupational therapy have been discussed with the patient.)  1. Activities of daily living training  2. Adaptive equipment training  3. Balance training  4. Clothing management  5. Donning&doffing training  6. Theraputic activity     TREATMENT PLAN: Frequency/Duration: Follow patient 1-2tx to address above goals. Rehabilitation Potential For Stated Goals: Excellent     RECOMMENDED REHABILITATION/EQUIPMENT: (at time of discharge pending progress): Continue Skilled Therapy. OCCUPATIONAL PROFILE AND HISTORY:   History of Present Injury/Illness (Reason for Referral): Pt presents this date s/p (Right) GENARO. Past Medical History/Comorbidities:   Ms. Av Velasquez  has a past medical history of ADD (attention deficit disorder), Arthritis, Depression, Essential hypertension (01/05/2017), Interstitial lung disease due to connective tissue disease ([address] Menopause, Mitral valve prolapse, Sciatic nerve disease, right, SLE (systemic lupus erythematosus) ([address] (09/18/2017), and Unspecified sleep apnea.  She also has no past medical history of Aneurysm ([address] Arrhythmia, Asthma, CAD (coronary artery disease), Cancer ([address] Chronic kidney disease, Chronic obstructive pulmonary disease (HCC), Chronic pain, Coagulation disorder ([facility] COPD, Diabetes ([facility] Difficult intubation, Endocarditis, GERD (gastroesophageal reflux disease), Heart failure ([address] Liver disease, Malignant hyperthermia due to anesthesia, Morbid obesity ([facility] [facility] Nausea & vomiting, Nicotine vapor product user, Non-nicotine vapor product user, Pseudocholinesterase deficiency, PUD (peptic ulcer disease), Rheumatic fever, Seizures ([facility] [address] Stroke ([address] Thromboembolus ([facility] [address] or Thyroid disease. Ms. Heron Johns  has a past surgical history that includes hx lumbar laminectomy (2009); hx colonoscopy (08/20/2013); hx tonsillectomy (1953); and hx dilation and curettage (1985). Social History/Living Environment:   Home Environment: Private residence  # Steps to Enter: 3  Hand Rails : Bilateral  One/Two Story Residence: One story  Living Alone: No  Support Systems: Spouse/Significant Other/Partner  Patient Expects to be Discharged to[de-identified] Private residence  Current DME Used/Available at Home: Brigitte [employer] or Shower Type: Shower  Prior Level of Function/Work/Activity:  Independent prior. Number of Personal Factors/Comorbidities that affect the Plan of Care: Brief history (0):  LOW COMPLEXITY   ASSESSMENT OF OCCUPATIONAL PERFORMANCE[de-identified]   Most Recent Physical Functioning:   Balance  Sitting: Intact  Standing: With support; Without support                              Mental Status  Neurologic State: Alert; Appropriate for age  Orientation Level: Appropriate for age  Cognition: Appropriate decision making; Appropriate for age attention/concentration; Appropriate safety awareness; Follows commands  Perception: Appears intact  Perseveration: No perseveration noted  Safety/Judgement: Awareness of environment; Fall prevention                Basic ADLs (From Assessment) Complex ADLs (From Assessment)   Basic ADL  Feeding: Independent  Oral Facial Hygiene/Grooming: Setup  Bathing: Minimum assistance  Type of Bath: Chlorhexidine (CHG), Shower  Upper Body Dressing: Setup  Lower Body Dressing: Moderate assistance  Toileting: Moderate assistance     Grooming/Bathing/Dressing Activities of Daily Living   Grooming  Grooming Assistance: Supervision  Position Performed: Seated in chair;Standing  Washing Face: Supervision  Washing Hands: Supervision  Brushing Teeth: Supervision  Applying Makeup: Set-up  Brushing/Combing Hair: Supervision Cognitive Retraining  Safety/Judgement: Awareness of environment; Fall prevention   Upper Body Bathing  Bathing Assistance: Supervision  Position Performed: Seated in chair  Adaptive Equipment: Tub bench     Lower Body Bathing  Bathing Assistance: Minimum assistance  Perineal  : Supervision  Position Performed: Standing  Adaptive Equipment: Grab bar  Lower Body : Minimum assistance  Position Performed: Seated in chair;Standing  Adaptive Equipment: Grab bar;Long handled sponge; Tub bench Toileting  Toileting Assistance: Stand-by assistance  Bladder Hygiene: Stand-by assistance  Clothing Management: Stand-by assistance  Adaptive Equipment: Elevated seat;Walker   Upper Body Dressing Assistance  Dressing Assistance: Supervision  Bra: Supervision  Hospital Gown: Supervision  Pullover Shirt: Supervision Functional Transfers  Bathroom Mobility: Stand-by assistance;Contact guard assistance  Toilet Transfer : Stand-by assistance;Contact guard assistance  Shower Transfer: Stand-by assistance;Contact guard assistance  Adaptive Equipment: Bedside commode;Grab bars; Tub transfer bench   Lower Body Dressing Assistance  Dressing Assistance: Contact guard assistance;Minimum assistance  Underpants: Contact guard assistance  Pants With Elastic Waist: Contact guard assistance  Socks: Contact guard assistance  Shoes with Elastic Laces: Minimum assistance  Adaptive Equipment Used: Grab bar;Long handled shoe horn;Reacher;Sock aid; Walker Bed/Mat Mobility  Supine to Sit: Contact guard assistance  Sit to Stand: Contact guard assistance  Stand to Sit: Stand-by assistance;Contact guard assistance  Bed to Chair: Stand-by assistance;Contact guard assistance         Physical Skills Involved:  1. Range of Motion  2. Balance  3. Strength Cognitive Skills Affected (resulting in the inability to perform in a timely and safe manner):  1. [facility]  Psychosocial Skills Affected:  1. WFL    Number of elements that affect the Plan of Care: 1-3:  LOW COMPLEXITY   CLINICAL DECISION MAKIN[address] [place] [address] AM-PAC 6 Clicks   Daily Activity Inpatient Short Form  How much help from another person does the patient currently need. .. Total A Lot A Little None   1. Putting on and taking off regular lower body clothing? [] 1   [] 2   [x] 3   [] 4   2. Bathing (including washing, rinsing, drying)? [] 1   [] 2   [x] 3   [] 4   3. Toileting, which includes using toilet, bedpan or urinal?   [] 1   [] 2   [x] 3   [] 4   4. Putting on and taking off regular upper body clothing? [] 1   [] 2   [] 3   [x] 4   5. Taking care of personal grooming such as brushing teeth? [] 1   [] 2   [] 3   [x] 4   6. Eating meals? [] 1   [] 2   [] 3   [x] 4   © , Trustees of [address] [place] [address], under license to [employer]. All rights reserved     Score:  Initial: 18 Most Recent: 21 discharge 2020    Interpretation of Tool:  Represents activities that are increasingly more difficult (i.e. Bed mobility, Transfers, Gait). ·     Use of outcome tool(s) and clinical judgement create a POC that gives a: MODERATE COMPLEXITY            TREATMENT:   (In addition to Assessment/Re-Assessment sessions the following treatments were rendered)     Pre-treatment Symptoms/Complaints:    Pain: Initial:   Pain Intensity 1: 2  Pain Location 1: Hip  Pain Orientation 1: Right  Pain Intervention(s) 1: Shower  Post Session:  2/10 rest     Self Care: (30): Procedure(s) (per grid) utilized to improve and/or restore self-care/home management as related to dressing, bathing, toileting and grooming. Required minimal visual, verbal and tactile cueing to facilitate activities of daily living skills, compensatory activities and hip kit training. Treatment/Session Assessment:     Response to Treatment:  Good, sitting up in recliner. Education:  [] Home Exercises  [x] Fall Precautions  [x] Hip Precautions [] Going Home Video  [] Knee/Hip Prosthesis Review  [x] Walker Management/Safety [x] Adaptive Equipment as Needed       Interdisciplinary Collaboration:   o Physical Therapy Assistant  o Occupational Therapist  o Registered Nurse    After treatment position/precautions:   o Up in chair  o Bed/Chair-wheels locked  o Call light within reach  o RN notified     Compliance with Program/Exercises: Compliant all of the time. Recommendations/Intent for next treatment session: Pt doing well all goals met and will do well at home with support from spouse. Patient will be discharged home with home health PT. No further Occupational Therapy warranted, will discharge Occupational Therapy services.         Total Treatment Duration:30  OT Patient Time In/Time Out  Time In: 0566  Time Out: [address], OT

## 2020-02-20 ENCOUNTER — HOME CARE VISIT (OUTPATIENT)
Dept: SCHEDULING | Facility: HOME HEALTH | Age: 70
End: 2020-02-20
Payer: MEDICARE

## 2020-02-20 VITALS
HEART RATE: 94 BPM | SYSTOLIC BLOOD PRESSURE: 114 MMHG | DIASTOLIC BLOOD PRESSURE: 72 MMHG | RESPIRATION RATE: 16 BRPM | TEMPERATURE: 99.3 F

## 2020-02-20 PROCEDURE — 3331090001 HH PPS REVENUE CREDIT

## 2020-02-20 PROCEDURE — 400013 HH SOC

## 2020-02-20 PROCEDURE — G0151 HHCP-SERV OF PT,EA 15 MIN: HCPCS

## 2020-02-20 PROCEDURE — 3331090002 HH PPS REVENUE DEBIT

## 2020-02-21 PROCEDURE — 3331090002 HH PPS REVENUE DEBIT

## 2020-02-21 PROCEDURE — 3331090001 HH PPS REVENUE CREDIT

## 2020-02-22 PROCEDURE — 3331090001 HH PPS REVENUE CREDIT

## 2020-02-22 PROCEDURE — 3331090002 HH PPS REVENUE DEBIT

## 2020-02-23 PROCEDURE — 3331090001 HH PPS REVENUE CREDIT

## 2020-02-23 PROCEDURE — 3331090002 HH PPS REVENUE DEBIT

## 2020-02-24 ENCOUNTER — HOME CARE VISIT (OUTPATIENT)
Dept: SCHEDULING | Facility: HOME HEALTH | Age: 70
End: 2020-02-24
Payer: MEDICARE

## 2020-02-24 VITALS
SYSTOLIC BLOOD PRESSURE: 132 MMHG | TEMPERATURE: 97.5 F | RESPIRATION RATE: 17 BRPM | DIASTOLIC BLOOD PRESSURE: 73 MMHG | HEART RATE: 93 BPM

## 2020-02-24 PROCEDURE — G0151 HHCP-SERV OF PT,EA 15 MIN: HCPCS

## 2020-02-24 PROCEDURE — 3331090002 HH PPS REVENUE DEBIT

## 2020-02-24 PROCEDURE — 3331090001 HH PPS REVENUE CREDIT

## 2020-02-25 PROCEDURE — 3331090002 HH PPS REVENUE DEBIT

## 2020-02-25 PROCEDURE — 3331090001 HH PPS REVENUE CREDIT

## 2020-02-26 ENCOUNTER — HOME CARE VISIT (OUTPATIENT)
Dept: SCHEDULING | Facility: HOME HEALTH | Age: 70
End: 2020-02-26
Payer: MEDICARE

## 2020-02-26 VITALS
RESPIRATION RATE: 16 BRPM | HEART RATE: 72 BPM | SYSTOLIC BLOOD PRESSURE: 124 MMHG | DIASTOLIC BLOOD PRESSURE: 64 MMHG | TEMPERATURE: 97.3 F

## 2020-02-26 PROCEDURE — G0157 HHC PT ASSISTANT EA 15: HCPCS

## 2020-02-26 PROCEDURE — 3331090001 HH PPS REVENUE CREDIT

## 2020-02-26 PROCEDURE — 3331090002 HH PPS REVENUE DEBIT

## 2020-02-27 PROCEDURE — 3331090001 HH PPS REVENUE CREDIT

## 2020-02-27 PROCEDURE — 3331090002 HH PPS REVENUE DEBIT

## 2020-02-28 ENCOUNTER — HOME CARE VISIT (OUTPATIENT)
Dept: SCHEDULING | Facility: HOME HEALTH | Age: 70
End: 2020-02-28
Payer: MEDICARE

## 2020-02-28 VITALS
TEMPERATURE: 96.6 F | DIASTOLIC BLOOD PRESSURE: 70 MMHG | SYSTOLIC BLOOD PRESSURE: 118 MMHG | RESPIRATION RATE: 18 BRPM | HEART RATE: 100 BPM

## 2020-02-28 PROCEDURE — A4649 SURGICAL SUPPLIES: HCPCS

## 2020-02-28 PROCEDURE — 3331090001 HH PPS REVENUE CREDIT

## 2020-02-28 PROCEDURE — G0157 HHC PT ASSISTANT EA 15: HCPCS

## 2020-02-28 PROCEDURE — 3331090002 HH PPS REVENUE DEBIT

## 2020-02-29 PROCEDURE — 3331090002 HH PPS REVENUE DEBIT

## 2020-02-29 PROCEDURE — 3331090001 HH PPS REVENUE CREDIT

## 2020-03-01 PROCEDURE — 3331090001 HH PPS REVENUE CREDIT

## 2020-03-01 PROCEDURE — 3331090002 HH PPS REVENUE DEBIT

## 2020-03-02 ENCOUNTER — HOME CARE VISIT (OUTPATIENT)
Dept: SCHEDULING | Facility: HOME HEALTH | Age: 70
End: 2020-03-02
Payer: MEDICARE

## 2020-03-02 PROCEDURE — 3331090002 HH PPS REVENUE DEBIT

## 2020-03-02 PROCEDURE — 3331090001 HH PPS REVENUE CREDIT

## 2020-03-02 PROCEDURE — G0157 HHC PT ASSISTANT EA 15: HCPCS

## 2020-03-03 VITALS
TEMPERATURE: 97 F | DIASTOLIC BLOOD PRESSURE: 70 MMHG | HEART RATE: 74 BPM | RESPIRATION RATE: 16 BRPM | SYSTOLIC BLOOD PRESSURE: 122 MMHG

## 2020-03-03 PROCEDURE — 3331090001 HH PPS REVENUE CREDIT

## 2020-03-03 PROCEDURE — 3331090002 HH PPS REVENUE DEBIT

## 2020-03-04 PROCEDURE — 3331090002 HH PPS REVENUE DEBIT

## 2020-03-04 PROCEDURE — 3331090001 HH PPS REVENUE CREDIT

## 2020-03-05 ENCOUNTER — HOME CARE VISIT (OUTPATIENT)
Dept: SCHEDULING | Facility: HOME HEALTH | Age: 70
End: 2020-03-05
Payer: MEDICARE

## 2020-03-05 PROCEDURE — 3331090001 HH PPS REVENUE CREDIT

## 2020-03-05 PROCEDURE — G0157 HHC PT ASSISTANT EA 15: HCPCS

## 2020-03-05 PROCEDURE — 3331090002 HH PPS REVENUE DEBIT

## 2020-03-06 VITALS
SYSTOLIC BLOOD PRESSURE: 118 MMHG | HEART RATE: 76 BPM | DIASTOLIC BLOOD PRESSURE: 64 MMHG | TEMPERATURE: 97.2 F | RESPIRATION RATE: 16 BRPM

## 2020-03-06 PROCEDURE — 3331090002 HH PPS REVENUE DEBIT

## 2020-03-06 PROCEDURE — 3331090001 HH PPS REVENUE CREDIT

## 2020-03-07 PROCEDURE — 3331090001 HH PPS REVENUE CREDIT

## 2020-03-07 PROCEDURE — 3331090002 HH PPS REVENUE DEBIT

## 2020-03-08 PROCEDURE — 3331090001 HH PPS REVENUE CREDIT

## 2020-03-08 PROCEDURE — 3331090002 HH PPS REVENUE DEBIT

## 2020-03-09 PROCEDURE — 3331090001 HH PPS REVENUE CREDIT

## 2020-03-09 PROCEDURE — 3331090002 HH PPS REVENUE DEBIT

## 2020-03-10 ENCOUNTER — HOME CARE VISIT (OUTPATIENT)
Dept: SCHEDULING | Facility: HOME HEALTH | Age: 70
End: 2020-03-10
Payer: MEDICARE

## 2020-03-10 ENCOUNTER — HOME CARE VISIT (OUTPATIENT)
Dept: HOME HEALTH SERVICES | Facility: HOME HEALTH | Age: 70
End: 2020-03-10
Payer: MEDICARE

## 2020-03-10 VITALS
TEMPERATURE: 96.8 F | SYSTOLIC BLOOD PRESSURE: 124 MMHG | DIASTOLIC BLOOD PRESSURE: 70 MMHG | RESPIRATION RATE: 16 BRPM | HEART RATE: 68 BPM

## 2020-03-10 PROCEDURE — 3331090001 HH PPS REVENUE CREDIT

## 2020-03-10 PROCEDURE — G0157 HHC PT ASSISTANT EA 15: HCPCS

## 2020-03-10 PROCEDURE — 3331090002 HH PPS REVENUE DEBIT

## 2020-03-11 PROCEDURE — 3331090002 HH PPS REVENUE DEBIT

## 2020-03-11 PROCEDURE — 3331090001 HH PPS REVENUE CREDIT

## 2020-03-12 ENCOUNTER — HOME CARE VISIT (OUTPATIENT)
Dept: SCHEDULING | Facility: HOME HEALTH | Age: 70
End: 2020-03-12
Payer: MEDICARE

## 2020-03-12 PROCEDURE — G0157 HHC PT ASSISTANT EA 15: HCPCS

## 2020-03-12 PROCEDURE — 3331090001 HH PPS REVENUE CREDIT

## 2020-03-12 PROCEDURE — 3331090002 HH PPS REVENUE DEBIT

## 2020-03-13 VITALS
HEART RATE: 76 BPM | DIASTOLIC BLOOD PRESSURE: 76 MMHG | RESPIRATION RATE: 16 BRPM | SYSTOLIC BLOOD PRESSURE: 122 MMHG | TEMPERATURE: 97.5 F

## 2020-03-13 PROCEDURE — 3331090002 HH PPS REVENUE DEBIT

## 2020-03-13 PROCEDURE — 3331090001 HH PPS REVENUE CREDIT

## 2020-03-14 PROCEDURE — 3331090002 HH PPS REVENUE DEBIT

## 2020-03-14 PROCEDURE — 3331090001 HH PPS REVENUE CREDIT

## 2020-03-15 PROCEDURE — 3331090001 HH PPS REVENUE CREDIT

## 2020-03-15 PROCEDURE — 3331090002 HH PPS REVENUE DEBIT

## 2020-03-16 ENCOUNTER — HOME CARE VISIT (OUTPATIENT)
Dept: SCHEDULING | Facility: HOME HEALTH | Age: 70
End: 2020-03-16
Payer: MEDICARE

## 2020-03-16 VITALS
TEMPERATURE: 97.5 F | DIASTOLIC BLOOD PRESSURE: 73 MMHG | HEART RATE: 98 BPM | SYSTOLIC BLOOD PRESSURE: 126 MMHG | RESPIRATION RATE: 17 BRPM

## 2020-03-16 PROCEDURE — 3331090002 HH PPS REVENUE DEBIT

## 2020-03-16 PROCEDURE — G0151 HHCP-SERV OF PT,EA 15 MIN: HCPCS

## 2020-03-16 PROCEDURE — 3331090001 HH PPS REVENUE CREDIT

## 2020-03-17 NOTE — PERIOP NOTES
Routed lab results to pt's PCP and surgeon. 8 lb weight loss last month secondary to dietary changes

## 2020-05-14 ENCOUNTER — HOSPITAL ENCOUNTER (OUTPATIENT)
Dept: GENERAL RADIOLOGY | Age: 70
Discharge: HOME | End: 2020-05-14
Attending: INTERNAL MEDICINE
Payer: MEDICARE

## 2020-05-14 DIAGNOSIS — R05.9 COUGH: ICD-10-CM

## 2020-05-14 PROCEDURE — 71046 X-RAY EXAM CHEST 2 VIEWS: CPT

## 2020-07-14 ENCOUNTER — HOSPITAL ENCOUNTER (OUTPATIENT)
Dept: MAMMOGRAPHY | Age: 70
Discharge: HOME | End: 2020-07-14
Attending: FAMILY MEDICINE
Payer: MEDICARE

## 2020-07-14 DIAGNOSIS — Z78.0 MENOPAUSE: ICD-10-CM

## 2020-07-14 DIAGNOSIS — Z12.31 VISIT FOR SCREENING MAMMOGRAM: ICD-10-CM

## 2020-07-14 PROCEDURE — 77080 DXA BONE DENSITY AXIAL: CPT

## 2020-07-14 PROCEDURE — 77067 SCR MAMMO BI INCL CAD: CPT

## 2021-04-01 ENCOUNTER — TRANSCRIBE ORDER (OUTPATIENT)
Dept: SCHEDULING | Age: 71
End: 2021-04-01

## 2021-04-01 DIAGNOSIS — Z12.31 SCREENING MAMMOGRAM FOR HIGH-RISK PATIENT: Primary | ICD-10-CM

## 2021-05-07 ENCOUNTER — HOSPITAL ENCOUNTER (OUTPATIENT)
Dept: GENERAL RADIOLOGY | Age: 71
Discharge: HOME | End: 2021-05-07
Payer: MEDICARE

## 2021-05-07 DIAGNOSIS — R05.9 COUGH: ICD-10-CM

## 2021-05-07 PROCEDURE — 71046 X-RAY EXAM CHEST 2 VIEWS: CPT

## 2021-07-15 ENCOUNTER — HOSPITAL ENCOUNTER (OUTPATIENT)
Dept: MAMMOGRAPHY | Age: 71
Discharge: HOME | End: 2021-07-15
Attending: FAMILY MEDICINE
Payer: MEDICARE

## 2021-07-15 DIAGNOSIS — Z12.31 SCREENING MAMMOGRAM FOR HIGH-RISK PATIENT: ICD-10-CM

## 2021-07-15 PROCEDURE — 77067 SCR MAMMO BI INCL CAD: CPT

## 2021-11-11 ENCOUNTER — HOSPITAL ENCOUNTER (OUTPATIENT)
Dept: GENERAL RADIOLOGY | Age: 71
Discharge: HOME | End: 2021-11-11
Payer: MEDICARE

## 2021-11-11 DIAGNOSIS — M25.531 PAIN AND SWELLING OF RIGHT WRIST: ICD-10-CM

## 2021-11-11 DIAGNOSIS — M25.431 PAIN AND SWELLING OF RIGHT WRIST: ICD-10-CM

## 2021-11-11 PROCEDURE — 73110 X-RAY EXAM OF WRIST: CPT

## 2022-03-18 PROBLEM — N95.1 MENOPAUSE SYNDROME: Status: ACTIVE | Noted: 2018-04-20

## 2022-03-18 PROBLEM — M32.8 OTHER FORMS OF SYSTEMIC LUPUS ERYTHEMATOSUS (HCC): Status: ACTIVE | Noted: 2017-09-18

## 2022-03-19 PROBLEM — Z96.649 S/P TOTAL HIP ARTHROPLASTY: Status: ACTIVE | Noted: 2020-02-19

## 2022-03-19 PROBLEM — E78.00 HYPERCHOLESTEROLEMIA: Status: ACTIVE | Noted: 2018-12-19

## 2022-03-19 PROBLEM — I10 ESSENTIAL HYPERTENSION: Status: ACTIVE | Noted: 2017-01-05

## 2022-03-19 PROBLEM — J84.89 INTERSTITIAL LUNG DISEASE DUE TO CONNECTIVE TISSUE DISEASE (HCC): Status: ACTIVE | Noted: 2017-09-18

## 2022-03-19 PROBLEM — R05.9 COUGH: Status: ACTIVE | Noted: 2019-02-21

## 2022-03-19 PROBLEM — R00.2 PALPITATIONS: Status: ACTIVE | Noted: 2018-05-07

## 2022-03-19 PROBLEM — M35.9 INTERSTITIAL LUNG DISEASE DUE TO CONNECTIVE TISSUE DISEASE (HCC): Status: ACTIVE | Noted: 2017-09-18

## 2022-03-20 PROBLEM — I73.00 RAYNAUD'S PHENOMENON WITHOUT GANGRENE: Status: ACTIVE | Noted: 2018-05-07

## 2022-03-20 PROBLEM — M16.11 ARTHRITIS OF RIGHT HIP: Status: ACTIVE | Noted: 2020-02-18

## 2022-06-03 ENCOUNTER — PREP FOR PROCEDURE (OUTPATIENT)
Dept: ADMINISTRATIVE | Age: 72
End: 2022-06-03

## 2022-06-03 RX ORDER — SODIUM CHLORIDE 9 MG/ML
INJECTION, SOLUTION INTRAVENOUS PRN
OUTPATIENT
Start: 2022-06-03

## 2022-06-03 RX ORDER — SODIUM CHLORIDE 0.9 % (FLUSH) 0.9 %
5-40 SYRINGE (ML) INJECTION PRN
OUTPATIENT
Start: 2022-06-03

## 2022-06-03 RX ORDER — SODIUM CHLORIDE 0.9 % (FLUSH) 0.9 %
5-40 SYRINGE (ML) INJECTION EVERY 12 HOURS SCHEDULED
OUTPATIENT
Start: 2022-06-03

## 2022-06-10 NOTE — PERIOP NOTE
Patient verified name, , and procedure. Type: 1a; abbreviated assessment per anesthesia guidelines    Labs per anesthesia: none    Instructed pt that they will be notified the day before their procedure by the GI Lab for time of arrival if their procedure is [facility] and Pre-op for [place] cases. Arrival times should be called by 5 pm. If no phone is received the patient should contact their respective hospital. The GI lab telephone number is [phone] and ES Pre-op is [phone]. Follow diet and prep instructions per office including NPO status. If patient has NOT received instructions from office patient is advised to call surgeon office, verbalizes understanding. Bath or shower the night before and the am of surgery with non-mositurizing soap. No lotions, oils, powders, cologne on skin. No make up, eye make up or jewelry. Wear loose fitting comfortable, clean clothing. Must have adult present in building the entire time . Medications for the day of procedure Amlodipine, Cymbalta, Plaquenil, Omeprazole,, patient to hold Diclofenac    The following discharge instructions reviewed with patient: medication given during procedure may cause drowsiness for several hours, therefore, do not drive or operate machinery for remainder of the day. You may not drink alcohol on the day of your procedure, please resume regular diet and activity unless otherwise directed. You may experience abdominal distention for several hours that is relieved by the passage of gas. Contact your physician if you have any of the following: fever or chills, severe abdominal pain or excessive amount of bleeding or a large amount when having a bowel movement.  Occasional specks of blood with bowel movement would not be unusual.

## 2022-06-14 NOTE — PROGRESS NOTES
Informed patient of 1000 arrival time for 1130 procedure. Informed patient of COVID protocols. Informed patient that they must have a  with them the entire time that they are in the hospital. Patient verbalized understanding.

## 2022-06-15 ENCOUNTER — ANESTHESIA (OUTPATIENT)
Dept: ENDOSCOPY | Age: 72
End: 2022-06-15
Payer: MEDICARE

## 2022-06-15 ENCOUNTER — HOSPITAL ENCOUNTER (OUTPATIENT)
Age: 72
Setting detail: OUTPATIENT SURGERY
Discharge: HOME | End: 2022-06-15
Attending: INTERNAL MEDICINE | Admitting: INTERNAL MEDICINE
Payer: MEDICARE

## 2022-06-15 ENCOUNTER — ANESTHESIA EVENT (OUTPATIENT)
Dept: ENDOSCOPY | Age: 72
End: 2022-06-15
Payer: MEDICARE

## 2022-06-15 VITALS
HEIGHT: 68 IN | TEMPERATURE: 98.2 F | SYSTOLIC BLOOD PRESSURE: 121 MMHG | RESPIRATION RATE: 16 BRPM | WEIGHT: 150 LBS | OXYGEN SATURATION: 96 % | BODY MASS INDEX: 22.73 KG/M2 | HEART RATE: 69 BPM | DIASTOLIC BLOOD PRESSURE: 66 MMHG

## 2022-06-15 PROCEDURE — 7100000011 HC PHASE II RECOVERY - ADDTL 15 MIN: Performed by: INTERNAL MEDICINE

## 2022-06-15 PROCEDURE — 2580000003 HC RX 258: Performed by: ANESTHESIOLOGY

## 2022-06-15 PROCEDURE — 3700000000 HC ANESTHESIA ATTENDED CARE: Performed by: INTERNAL MEDICINE

## 2022-06-15 PROCEDURE — 2580000003 HC RX 258: Performed by: NURSE ANESTHETIST, CERTIFIED REGISTERED

## 2022-06-15 PROCEDURE — 2500000003 HC RX 250 WO HCPCS: Performed by: NURSE ANESTHETIST, CERTIFIED REGISTERED

## 2022-06-15 PROCEDURE — 3609027000 HC COLONOSCOPY: Performed by: INTERNAL MEDICINE

## 2022-06-15 PROCEDURE — 6360000002 HC RX W HCPCS: Performed by: NURSE ANESTHETIST, CERTIFIED REGISTERED

## 2022-06-15 PROCEDURE — 7100000010 HC PHASE II RECOVERY - FIRST 15 MIN: Performed by: INTERNAL MEDICINE

## 2022-06-15 PROCEDURE — 2709999900 HC NON-CHARGEABLE SUPPLY: Performed by: INTERNAL MEDICINE

## 2022-06-15 PROCEDURE — 3700000001 HC ADD 15 MINUTES (ANESTHESIA): Performed by: INTERNAL MEDICINE

## 2022-06-15 RX ORDER — PROPOFOL 10 MG/ML
INJECTION, EMULSION INTRAVENOUS PRN
Status: DISCONTINUED | OUTPATIENT
Start: 2022-06-15 | End: 2022-06-15 | Stop reason: SDUPTHER

## 2022-06-15 RX ORDER — LIDOCAINE HYDROCHLORIDE 20 MG/ML
INJECTION, SOLUTION EPIDURAL; INFILTRATION; INTRACAUDAL; PERINEURAL PRN
Status: DISCONTINUED | OUTPATIENT
Start: 2022-06-15 | End: 2022-06-15 | Stop reason: SDUPTHER

## 2022-06-15 RX ORDER — SODIUM CHLORIDE, SODIUM LACTATE, POTASSIUM CHLORIDE, CALCIUM CHLORIDE 600; 310; 30; 20 MG/100ML; MG/100ML; MG/100ML; MG/100ML
INJECTION, SOLUTION INTRAVENOUS CONTINUOUS PRN
Status: DISCONTINUED | OUTPATIENT
Start: 2022-06-15 | End: 2022-06-15 | Stop reason: SDUPTHER

## 2022-06-15 RX ORDER — SODIUM CHLORIDE, SODIUM LACTATE, POTASSIUM CHLORIDE, CALCIUM CHLORIDE 600; 310; 30; 20 MG/100ML; MG/100ML; MG/100ML; MG/100ML
INJECTION, SOLUTION INTRAVENOUS CONTINUOUS
Status: DISCONTINUED | OUTPATIENT
Start: 2022-06-15 | End: 2022-06-15 | Stop reason: HOSPADM

## 2022-06-15 RX ADMIN — SODIUM CHLORIDE, POTASSIUM CHLORIDE, SODIUM LACTATE AND CALCIUM CHLORIDE: 600; 310; 30; 20 INJECTION, SOLUTION INTRAVENOUS at 11:14

## 2022-06-15 RX ADMIN — PROPOFOL 160 MCG/KG/MIN: 10 INJECTION, EMULSION INTRAVENOUS at 12:04

## 2022-06-15 RX ADMIN — PROPOFOL 30 MG: 10 INJECTION, EMULSION INTRAVENOUS at 12:03

## 2022-06-15 RX ADMIN — LIDOCAINE HYDROCHLORIDE 40 MG: 20 INJECTION, SOLUTION EPIDURAL; INFILTRATION; INTRACAUDAL; PERINEURAL at 12:03

## 2022-06-15 RX ADMIN — SODIUM CHLORIDE, SODIUM LACTATE, POTASSIUM CHLORIDE, AND CALCIUM CHLORIDE: 600; 310; 30; 20 INJECTION, SOLUTION INTRAVENOUS at 12:00

## 2022-06-15 ASSESSMENT — PAIN - FUNCTIONAL ASSESSMENT
PAIN_FUNCTIONAL_ASSESSMENT: NONE - DENIES PAIN
PAIN_FUNCTIONAL_ASSESSMENT: NONE - DENIES PAIN

## 2022-06-15 NOTE — OP NOTE
Procedure:  Colonoscopy    Date of Procedure:  6/15/2022    Patient:  Danilo Hayes     1950    Indication:  History of colon polyps     Sedation:  MAC    Pre-Procedure Exam:    Mental status:  alert and oriented  Airway:  normal oropharyngeal airway and neck mobility  CV:  regular rate and rhythm   Respiratory:  clear to auscultation    Procedure:  A History and Physical has been performed, and patient medication allergies have been reviewed. Risks of perforation, hemorrhage, adverse drug reaction, and aspiration were discussed. Informed consent was obtained for the procedure, including sedation. The patient was placed in the left lateral decubitus position. The heart rate, oxygen saturations, blood pressure, and response to care were monitored throughout the procedure. After performing a rectal exam, the colonoscope was passed through the anus and advanced under direct vision to the cecum, identified by appendiceal orifice and ileocecal valve. The quality of prep was adequate. A careful inspection was made as the colonoscope was withdrawn, including a retroflexed view of the rectum. The patient tolerated the procedure well. Findings:     ANUS:  Anal exam reveals no masses or hemorrhoids. RECTUM:  Rectal exam reveals no masses or hemorrhoids. SIGMOID COLON:  A few small-mouthed diverticula were seen. DESCENDING COLON:  The mucosa is normal with good vascular pattern and without ulcers, diverticula, and polyps. SPLENIC FLEXURE:  The splenic flexure is normal.   TRANSVERSE COLON:  The mucosa is normal with good vascular pattern and without ulcers, diverticula, and polyps. HEPATIC FLEXURE:  The hepatic flexure is normal.   ASCENDING COLON:  The mucosa is normal with good vascular pattern and without ulcers, diverticula, and polyps. CECUM:  The appendiceal orifice appears normal. The ileocecal valve appears normal.   TERMINAL ILEUM:  The terminal ileum was not entered. Specimen:  Yes    Estimated Blood Loss:  Minimal    Implant:  None           Impression:    Diverticulosis. Plan:  1. High fiber diet indefinitely. 2. Repeat colonoscopy for surveillance in 5 years based on personal history of colon polyps. 3. Return to office on an as-needed basis.      Signed:  Victor Manuel Ames MD  6/15/2022  12:25 PM

## 2022-06-15 NOTE — ANESTHESIA POSTPROCEDURE EVALUATION
Department of Anesthesiology  Postprocedure Note    Patient: Nahomy Moctezuma  MRN: [id number]  YOB: 1950  Date of evaluation: 6/15/2022  Time:  1:28 PM     Procedure Summary     Date: 06/15/22 Room / Location: [facility] 04 / [facility] ENDOSCOPY    Anesthesia Start: 1200 Anesthesia Stop: 1230    Procedure: COLORECTAL CANCER SCREENING, NOT HIGH RISK/23 (N/A Lower GI Region) Diagnosis:       Constipation, unspecified constipation type      Personal history of colonic polyps      (Constipation, unspecified constipation type [K59.00])      (Personal history of colonic polyps [Z86.010])    Surgeons: Lia Dewitt MD Responsible Provider: Deo Norwood MD    Anesthesia Type: Not recorded ASA Status: Not recorded          Anesthesia Type: No value filed. Marlee Phase I: Marlee Score: 9    Marlee Phase II: Marlee Score: 10    Last vitals: Reviewed and per EMR flowsheets.        Anesthesia Post Evaluation    Patient location during evaluation: PACU  Patient participation: complete - patient participated  Level of consciousness: awake and alert  Airway patency: patent  Nausea & Vomiting: no nausea and no vomiting  Complications: no  Cardiovascular status: hemodynamically stable  Respiratory status: acceptable, nonlabored ventilation and spontaneous ventilation  Hydration status: euvolemic  Comments: /66   Pulse 69   Temp 98.2 °F (36.8 °C) (Oral)   Resp 16   Ht 5' 7.5\" (1.715 m)   Wt 150 lb (68 kg)   SpO2 96%   BMI 23.15 kg/m²     Multimodal analgesia pain management approach

## 2022-06-15 NOTE — H&P
History and Physical for Colonoscopy             Date: 6/15/2022     History of Present Illness:  Patient presents to undergo colonoscopy. Past Medical History:   Diagnosis Date    ADD (attention deficit disorder)     Rx daily    Arthritis     osteoarthritis in knees and hands    Depression     Essential hypertension 01/05/2017    controlled w/med    Interstitial lung disease due to connective tissue disease ([facility] [address]     followed by [facility]. most recent OV 3/2019-ILD r/t SLE. seen annually w/CXR and Spirometry.  Menopause     Mitral valve prolapse     Sciatic nerve disease, right     SLE (systemic lupus erythematosus) ([facility] [address] 09/18/2017    followed by [facility]-most recent [address] 12/2019. SLE stable w/med    Unspecified sleep apnea     sleeps with c-pap     Past Surgical History:   Procedure Laterality Date    COLONOSCOPY  08/20/2013    with polypectomy    DILATION AND CURETTAGE OF UTERUS  1985    LUMBAR LAMINECTOMY  2009    TONSILLECTOMY  1953    TOTAL HIP ARTHROPLASTY Right 02/2020      Family History   Problem Relation Age of Onset    Post-op Nausea/Vomiting Neg Hx     Emergence Delirium Neg Hx     Pseudochol. Deficiency Neg Hx     Post-op Cognitive Dysfunction Neg Hx     Other Neg Hx     Breast Cancer Neg Hx     Heart Disease Sister     Stroke Mother     Cancer Father         Lung    Heart Disease Father         CAD/MI    Cancer Brother         cancer    Lung Disease Brother         COPD    Malig Hypertherm Neg Hx      Social History     Tobacco Use    Smoking status: Never Smoker    Smokeless tobacco: Never Used   Substance Use Topics    Alcohol use: Yes        Allergies   Allergen Reactions    Sulfa Antibiotics Other (See Comments)     Visual dusturbances    Penicillins Rash     No current facility-administered medications for this encounter.      Current Outpatient Medications   Medication Sig    acetaminophen (TYLENOL) 500 MG tablet Take 1,000 mg by mouth every 6 hours as needed    acyclovir (ZOVIRAX) 400 MG tablet Take 400 mg by mouth as needed     alclomethasone (ACLOVATE) 0.05 % cream Apply topically 2 times daily    amLODIPine (NORVASC) 10 MG tablet Take 10 mg by mouth daily    amphetamine-dextroamphetamine (ADDERALL) 30 MG tablet Take 1 tablet by mouth daily.  vitamin D3 (CHOLECALCIFEROL) 10 MCG (400 UNIT) TABS tablet Take by mouth daily    diclofenac (VOLTAREN) 75 MG EC tablet Take 75 mg by mouth 2 times daily    DULoxetine (CYMBALTA) 30 MG extended release capsule Take 30 mg by mouth 2 times daily    estradiol (ESTRACE) 0.1 MG/GM vaginal cream Place 1 g vaginally Twice a Week    conjugated estrogens (PREMARIN) 0.625 MG/GM vaginal cream Place 0.5 g vaginally    hydrocortisone 1 % cream Apply topically 2 times daily    hydroxychloroquine (PLAQUENIL) 200 MG tablet Take 1 pill once a day after food.  losartan (COZAAR) 25 MG tablet Take 25 mg by mouth daily    nystatin (MYCOSTATIN) 495827 UNIT/GM cream Apply topically 2 times daily    omeprazole (PRILOSEC) 20 MG delayed release capsule Take 20 mg by mouth 2 times daily    rosuvastatin (CRESTOR) 10 MG tablet Take 10 mg by mouth        Review of Systems:  A detailed 10 organ review of systems is obtained with pertinent positives as listed in the History of Present Illness. All others are negative. Objective:     Physical Exam:  Ht 5' 7\" (1.702 m)   Wt 150 lb (68 kg)   BMI 23.49 kg/m²    General:  Alert and oriented. Heart: Regular rate and rhythm  Lungs:  Clear to auscultation bilaterally  Abdomen: Soft, nontender, nondistended    Impression/Plan:     Proceed with colonoscopy as planned. I have discussed with the patient the technique, benefits, alternatives, and risks of these procedures, including medication reaction, immediate or delayed bleeding, or perforation of the gastrointestinal tract.      Signed By: Caitlyn Patel MD     Nan 15, 2022

## 2022-06-15 NOTE — ANESTHESIA PRE PROCEDURE
Department of Anesthesiology  Preprocedure Note       Name:  Kaye Barber   Age:  66 y.o.  :  1950                                          MRN:  [id number]         Date:  6/15/2022      Surgeon: Romi Leon):  Adria Viveros MD    Procedure: Procedure(s):  COLORECTAL CANCER SCREENING, NOT HIGH RISK/23    Medications prior to admission:   Prior to Admission medications    Medication Sig Start Date End Date Taking? Authorizing Provider   acetaminophen (TYLENOL) 500 MG tablet Take 1,000 mg by mouth every 6 hours as needed    Ar Automatic Reconciliation   acyclovir (ZOVIRAX) 400 MG tablet Take 400 mg by mouth as needed  18   Ar Automatic Reconciliation   alclomethasone (ACLOVATE) 0.05 % cream Apply topically 2 times daily 18   Ar Automatic Reconciliation   amLODIPine (NORVASC) 10 MG tablet Take 10 mg by mouth daily 21   Ar Automatic Reconciliation   amphetamine-dextroamphetamine (ADDERALL) 30 MG tablet Take 1 tablet by mouth daily. 19   Ar Automatic Reconciliation   vitamin D3 (CHOLECALCIFEROL) 10 MCG (400 UNIT) TABS tablet Take by mouth daily    Ar Automatic Reconciliation   diclofenac (VOLTAREN) 75 MG EC tablet Take 75 mg by mouth 2 times daily 20   Ar Automatic Reconciliation   DULoxetine (CYMBALTA) 30 MG extended release capsule Take 30 mg by mouth 2 times daily 2/3/22 2/3/23  Ar Automatic Reconciliation   estradiol (ESTRACE) 0.1 MG/GM vaginal cream Place 1 g vaginally Twice a Week 21   Ar Automatic Reconciliation   conjugated estrogens (PREMARIN) 0.625 MG/GM vaginal cream Place 0.5 g vaginally 18   Ar Automatic Reconciliation   hydrocortisone 1 % cream Apply topically 2 times daily 18   Ar Automatic Reconciliation   hydroxychloroquine (PLAQUENIL) 200 MG tablet Take 1 pill once a day after food.  3/14/22   Ar Automatic Reconciliation   losartan (COZAAR) 25 MG tablet Take 25 mg by mouth daily    Ar Automatic Reconciliation   nystatin (MYCOSTATIN) 174978 UNIT/GM cream Apply topically 2 times daily 6/30/21   Ar Automatic Reconciliation   omeprazole (PRILOSEC) 20 MG delayed release capsule Take 20 mg by mouth 2 times daily 1/16/19   Ar Automatic Reconciliation   rosuvastatin (CRESTOR) 10 MG tablet Take 10 mg by mouth 1/16/19   Ar Automatic Reconciliation       Current medications:    Current Facility-Administered Medications   Medication Dose Route Frequency Provider Last Rate Last Admin    lactated ringers infusion   IntraVENous Continuous Deo Norwood  mL/hr at 06/15/22 1114 New Bag at 06/15/22 1114       Allergies: Allergies   Allergen Reactions    Sulfa Antibiotics Other (See Comments)     Visual dusturbances    Penicillins Rash       Problem List:    Patient Active Problem List   Diagnosis Code    Arthritis M19.90    Pain of right lower leg M79.661    Other forms of systemic lupus erythematosus ([facility] [address] M32.8    Menopause syndrome N95.1    Mitral valve prolapse I34.1    Interstitial lung disease due to connective tissue disease ([facility] [address] J84.89, M35.9    Sleep apnea G47.30    Palpitations R00.2    ADD (attention deficit disorder) F98.8    Essential hypertension I10    S/P total hip arthroplasty Z96.649    Dyspepsia R10.13    Chronic right-sided low back pain with right-sided sciatica M54.41, G89.29    Hypercholesterolemia E78.00    Cough R05.9    Arthritis of right hip M16.11    Raynaud's phenomenon without gangrene I73.00       Past Medical History:        Diagnosis Date    ADD (attention deficit disorder)     Rx daily    Arthritis     osteoarthritis in knees and hands    Depression     Essential hypertension 01/05/2017    controlled w/med    Interstitial lung disease due to connective tissue disease ([facility] [address]     followed by [facility] most recent OV 3/2019-ILD r/t SLE. seen annually w/CXR and Spirometry.     Menopause     Mitral valve prolapse     Sciatic nerve disease, right     SLE (systemic lupus erythematosus) ([facility] [address] 09/18/2017 followed by [facility]ology-most recent [address] 12/2019. SLE stable w/med    Unspecified sleep apnea     sleeps with c-pap       Past Surgical History:        Procedure Laterality Date    COLONOSCOPY  08/20/2013    with polypectomy    DILATION AND CURETTAGE OF UTERUS  1985    LUMBAR LAMINECTOMY  2009    TONSILLECTOMY  1953    TOTAL HIP ARTHROPLASTY Right 02/2020       Social History:    Social History     Tobacco Use    Smoking status: Never Smoker    Smokeless tobacco: Never Used   Substance Use Topics    Alcohol use: Yes                                Counseling given: Not Answered      Vital Signs (Current):   Vitals:    06/10/22 1529 06/15/22 1058   BP:  (!) 153/72   Pulse:  95   Resp:  16   Temp:  98.2 °F (36.8 °C)   TempSrc:  Oral   SpO2:  98%   Weight: 150 lb (68 kg) 150 lb (68 kg)   Height: 5' 7\" (1.702 m) 5' 7.5\" (1.715 m)                                              BP Readings from Last 3 Encounters:   06/15/22 (!) 153/72   03/14/22 132/82   11/11/21 134/80       NPO Status: Time of last liquid consumption: 0730                        Time of last solid consumption: 2200                        Date of last liquid consumption: 06/15/22                        Date of last solid food consumption: 06/13/22    BMI:   Wt Readings from Last 3 Encounters:   06/15/22 150 lb (68 kg)   03/14/22 154 lb 3.2 oz (69.9 kg)   11/11/21 154 lb (69.9 kg)     Body mass index is 23.15 kg/m².     CBC:   Lab Results   Component Value Date    WBC 4.2 03/14/2022    RBC 4.12 03/14/2022    HGB 12.6 03/14/2022    HCT 38.5 03/14/2022    MCV 93 03/14/2022    RDW 11.9 03/14/2022     03/14/2022       CMP:   Lab Results   Component Value Date     03/14/2022    K 4.4 03/14/2022     03/14/2022    CO2 23 03/14/2022    BUN 20 03/14/2022    CREATININE 0.70 03/14/2022    GFRAA 85 11/11/2021    AGRATIO 2.0 03/14/2022    GLUCOSE 97 03/14/2022    PROT 6.7 03/14/2022    CALCIUM 9.4 03/14/2022    BILITOT 0.4 03/14/2022 ALKPHOS 95 03/14/2022    AST 22 03/14/2022    ALT 18 03/14/2022       POC Tests: No results for input(s): POCGLU, POCNA, POCK, POCCL, POCBUN, POCHEMO, POCHCT in the last 72 hours. Coags:   Lab Results   Component Value Date    PROTIME 12.3 01/28/2020    INR 0.9 01/28/2020    APTT 28.1 01/28/2020       HCG (If Applicable): No results found for: PREGTESTUR, PREGSERUM, HCG, HCGQUANT     ABGs: No results found for: PHART, PO2ART, WVI6LQX, SDX7RKH, BEART, X0PIBQGX     Type & Screen (If Applicable):  No results found for: LABABO, LABRH    Drug/Infectious Status (If Applicable):  No results found for: HIV, HEPCAB    COVID-19 Screening (If Applicable): No results found for: COVID19        Anesthesia Evaluation  Patient summary reviewed and Nursing notes reviewed no history of anesthetic complications:   Airway: Mallampati: II  TM distance: >3 FB   Neck ROM: full  Mouth opening: > = 3 FB   Dental: normal exam         Pulmonary:normal exam    (+) sleep apnea:                            ROS comment: Interstitial lung disease associated with SLE   Cardiovascular:    (+) hypertension:,         Rhythm: regular  Rate: normal                    Neuro/Psych:   (+) neuromuscular disease (Right sided sciatica):, psychiatric history (ADD):            GI/Hepatic/Renal:             Endo/Other:                     Abdominal:             Vascular: Other Findings:           Anesthesia Plan      TIVA     ASA 2       Induction: intravenous. Anesthetic plan and risks discussed with patient and spouse. Use of blood products discussed with whom consented to blood products.                      Cinthya Pisano MD   6/15/2022

## 2022-06-24 ENCOUNTER — TELEPHONE (OUTPATIENT)
Dept: PULMONOLOGY | Age: 72
End: 2022-06-24

## 2022-06-24 DIAGNOSIS — J84.9 ILD (INTERSTITIAL LUNG DISEASE) (HCC): Primary | ICD-10-CM

## 2022-07-15 ENCOUNTER — OFFICE VISIT (OUTPATIENT)
Dept: RHEUMATOLOGY | Age: 72
End: 2022-07-15
Payer: MEDICARE

## 2022-07-15 VITALS
WEIGHT: 160 LBS | SYSTOLIC BLOOD PRESSURE: 139 MMHG | HEART RATE: 100 BPM | DIASTOLIC BLOOD PRESSURE: 82 MMHG | BODY MASS INDEX: 24.25 KG/M2 | HEIGHT: 68 IN

## 2022-07-15 DIAGNOSIS — Z79.899 LONG-TERM USE OF HIGH-RISK MEDICATION: ICD-10-CM

## 2022-07-15 DIAGNOSIS — M32.9 SYSTEMIC LUPUS ERYTHEMATOSUS, UNSPECIFIED SLE TYPE, UNSPECIFIED ORGAN INVOLVEMENT STATUS (HCC): Primary | ICD-10-CM

## 2022-07-15 DIAGNOSIS — I73.00 RAYNAUD'S DISEASE WITHOUT GANGRENE: ICD-10-CM

## 2022-07-15 LAB
ALBUMIN SERPL-MCNC: 4 G/DL (ref 3.2–4.6)
ALBUMIN/GLOB SERPL: 1.2 {RATIO} (ref 1.2–3.5)
ALP SERPL-CCNC: 112 U/L (ref 50–136)
ALT SERPL-CCNC: 52 U/L (ref 12–65)
ANION GAP SERPL CALC-SCNC: 3 MMOL/L (ref 7–16)
AST SERPL-CCNC: 37 U/L (ref 15–37)
BILIRUB SERPL-MCNC: 0.4 MG/DL (ref 0.2–1.1)
BUN SERPL-MCNC: 20 MG/DL (ref 8–23)
CALCIUM SERPL-MCNC: 9.6 MG/DL (ref 8.3–10.4)
CHLORIDE SERPL-SCNC: 109 MMOL/L (ref 98–107)
CO2 SERPL-SCNC: 30 MMOL/L (ref 21–32)
CREAT SERPL-MCNC: 0.8 MG/DL (ref 0.6–1)
GLOBULIN SER CALC-MCNC: 3.3 G/DL (ref 2.3–3.5)
GLUCOSE SERPL-MCNC: 91 MG/DL (ref 65–100)
POTASSIUM SERPL-SCNC: 4.4 MMOL/L (ref 3.5–5.1)
PROT SERPL-MCNC: 7.3 G/DL (ref 6.3–8.2)
SODIUM SERPL-SCNC: 142 MMOL/L (ref 136–145)

## 2022-07-15 PROCEDURE — 99214 OFFICE O/P EST MOD 30 MIN: CPT | Performed by: INTERNAL MEDICINE

## 2022-07-15 PROCEDURE — 1123F ACP DISCUSS/DSCN MKR DOCD: CPT | Performed by: INTERNAL MEDICINE

## 2022-07-15 ASSESSMENT — ROUTINE ASSESSMENT OF PATIENT INDEX DATA (RAPID3)
ON A SCALE OF ONE TO TEN, HOW DIFFICULT WAS IT FOR YOU TO COMPLETE THE LISTED DAILY PHYSICAL TASKS OVER THE LAST WEEK: 0.4
WHEN YOU AWAKENED IN THE MORNING OVER THE LAST WEEK, PLEASE INDICATE THE AMOUNT OF TIME IT TAKES UNTIL YOU ARE AS LIMBER AS YOU WILL BE FOR THE DAY: 15 MIN
ON A SCALE OF ONE TO TEN, HOW MUCH PAIN HAVE YOU HAD BECAUSE OF YOUR CONDITION OVER THE PAST WEEK?: 2
ON A SCALE OF ONE TO TEN, HOW MUCH OF A PROBLEM HAS UNUSUAL FATIGUE OR TIREDNESS BEEN FOR YOU OVER THE PAST WEEK?: 4
ON A SCALE OF ONE TO TEN, CONSIDERING ALL THE WAYS IN WHICH ILLNESS AND HEALTH CONDITIONS MAY AFFECT YOU AT THIS TIME, PLEASE INDICATE BELOW HOW YOU ARE DOING:: 2

## 2022-07-15 ASSESSMENT — PATIENT HEALTH QUESTIONNAIRE - PHQ9
1. LITTLE INTEREST OR PLEASURE IN DOING THINGS: 0
SUM OF ALL RESPONSES TO PHQ QUESTIONS 1-9: 0
SUM OF ALL RESPONSES TO PHQ9 QUESTIONS 1 & 2: 0
SUM OF ALL RESPONSES TO PHQ QUESTIONS 1-9: 0
2. FEELING DOWN, DEPRESSED OR HOPELESS: 0
SUM OF ALL RESPONSES TO PHQ QUESTIONS 1-9: 0
SUM OF ALL RESPONSES TO PHQ QUESTIONS 1-9: 0

## 2022-07-15 NOTE — PROGRESS NOTES
plaquenil/hydroxychloroquine, sulfasalazine, Arava/leflunomide): Currently  on Plaquenil 200 mg QD       Past biologics, if applicable  (enbrel, humira, simponi, cimzia, xeljanz, orencia, remicade, simponi aria, actemra, rituximab, Kristene Gus, stelara, cosentyx): None       Past NSAIDs, if applicable (motrin, aleve, naproxen, advil, ibuprofen, celebrex, voltaren/diclofenac, etc.):  Motrin/Ibuprofen, Naproxen in the and Diclofenac 75 mg BID in the past.  Currently on Tylenol 325 mg PRN. Last BMD: Summer 2020  Past osteoporosis drugs, if applicable (fosamax, actonel, boniva, reclast, prolia, forteo): None        BMI: 24.69       Current exercise regimen, if any: none  Current vitamin D dose: D3- 400 QD  Current calcium dose: 600 mg QD  Fractures since last visit, if any: None      The patient otherwise has no significant interval changes in health or medical history to report. History Reviewed:    Past Medical History  Past Medical History:   Diagnosis Date    ADD (attention deficit disorder)     Rx daily    Arthritis     osteoarthritis in knees and hands    Depression     Essential hypertension 01/05/2017    controlled w/med    Interstitial lung disease due to connective tissue disease ([facility] [address]     followed by Ronni Mckee. most recent OV 3/2019-ILD r/t SLE. seen annually w/CXR and Spirometry. Menopause     Mitral valve prolapse     Sciatic nerve disease, right     SLE (systemic lupus erythematosus) ([facility] [address] 09/18/2017    followed by [facility] Rheumatology-most recent Dm Chase 12/2019.  SLE stable w/med    Unspecified sleep apnea     sleeps with c-pap       Past Surgical History  Past Surgical History:   Procedure Laterality Date    COLONOSCOPY  08/20/2013    with polypectomy    COLONOSCOPY N/A 6/15/2022    COLORECTAL CANCER SCREENING, NOT HIGH RISK/23 performed by Megha Duncan MD at [address] ARTHROPLASTY Right 02/2020       Family History  Family History   Problem Relation Age of Onset    Post-op Nausea/Vomiting Neg Hx     Emergence Delirium Neg Hx     Pseudochol. Deficiency Neg Hx     Post-op Cognitive Dysfunction Neg Hx     Other Neg Hx     Breast Cancer Neg Hx     Heart Disease Sister     Stroke Mother     Cancer Father         Lung    Heart Disease Father         CAD/MI    Cancer Brother         cancer    Lung Disease Brother         COPD    Malig Hypertherm Neg Hx        Social History  Social History     Socioeconomic History    Marital status:      Spouse name: None    Number of children: None    Years of education: None    Highest education level: None   Tobacco Use    Smoking status: Never    Smokeless tobacco: Never   Substance and Sexual Activity    Alcohol use: Yes    Drug use: No   Social History Narrative    Denies any sexual or physical abuse and feels safe at home. Allergy:  Allergies   Allergen Reactions    Sulfa Antibiotics Other (See Comments)     Visual dusturbances    Penicillins Rash         Current Medications:  Outpatient Encounter Medications as of 7/15/2022   Medication Sig Dispense Refill    acetaminophen (TYLENOL) 500 MG tablet Take 1,000 mg by mouth every 6 hours as needed      acyclovir (ZOVIRAX) 400 MG tablet Take 400 mg by mouth as needed       alclomethasone (ACLOVATE) 0.05 % cream Apply topically 2 times daily      amLODIPine (NORVASC) 10 MG tablet Take 10 mg by mouth daily      amphetamine-dextroamphetamine (ADDERALL) 30 MG tablet Take 1 tablet by mouth daily.       vitamin D3 (CHOLECALCIFEROL) 10 MCG (400 UNIT) TABS tablet Take by mouth daily      diclofenac (VOLTAREN) 75 MG EC tablet Take 75 mg by mouth 2 times daily      DULoxetine (CYMBALTA) 30 MG extended release capsule Take 30 mg by mouth 2 times daily      estradiol (ESTRACE) 0.1 MG/GM vaginal cream Place 1 g vaginally Twice a Week      hydrocortisone 1 % cream Apply topically 2 times daily hydroxychloroquine (PLAQUENIL) 200 MG tablet Take 1 pill once a day after food. losartan (COZAAR) 25 MG tablet Take 25 mg by mouth daily      nystatin (MYCOSTATIN) 157604 UNIT/GM cream Apply topically 2 times daily      omeprazole (PRILOSEC) 20 MG delayed release capsule Take 20 mg by mouth 2 times daily      rosuvastatin (CRESTOR) 10 MG tablet Take 10 mg by mouth      conjugated estrogens (PREMARIN) 0.625 MG/GM vaginal cream Place 0.5 g vaginally       No facility-administered encounter medications on file as of 7/15/2022. REVIEW OF SYSTEMS: The following systems were reviewed with patient today and were negative except for the following (depicted with an \"X\"):        \"X\" General  \"X\" Head and Neck  \"X\" Heart and Breathing  \"X\" Gastrointestinal    Fever/chills   Hair loss   Shortness of breath   Upset stomach    Falls   Dry mouth   Coughing   Diarrhea / constipation    Wt loss   Mouth sores   Wheezing   Heartburn    Wt gain   Ringing ears   Chest pain   Dark or bloody stools    Night sweats   Diff. swallowing  X None of above   Nausea or vomiting   X None of above  X None of above     X None of above                \"X\" Skin  \"X\" Neurology  \"X\" Urinary/Gyn  \"X\" Other    Easy bruising   Numbness/ tingling   Female problems   Depression    Rashes   Weakness   Problems with urination   Feeling anxious    Sun sensitivity   Headaches  X None of above   Problems sleeping   X None of above  X None of above     X None of above          Physical Exam:  Blood pressure 139/82, pulse 100, height 5' 7.5\" (1.715 m), weight 160 lb (72.6 kg). General:  Patient alert, cooperative and in no apparent distress. HEENT: Pupils equally reactive to light and accommodation, minimal scleral injection noted. Heart: Regular rate and rhythm, normal S1 and S2, no rubs or gallops. Lungs: Clear to auscultation bilaterally. Abdomen: Soft, nontender, no hepatosplenomegaly. Skin:  No rashes.  No nail abnormalities. Neurologic:  Oriented, normal speech and affect. Normal gait. Extremities:  No edema in bilateral lower extremities with no cyanosis or clubbing    Muskoskeletal Exam:     I examined the shoulders, elbows, wrists, MCPs, PIPs, DIPs and knees bilaterally for strength, range of motion, deformity, tenderness, swelling, and synovitis. The findings are: Does have tenderness on palpation of the left CMC joint of the thumb with no synovitis, warmth or redness. Flexion as well as extension of the fingers in the left hand is intact. Does have tenderness on palpation of the L-spine with right SI joint tenderness but no left SI joint tenderness. Does have joint crepitus on flexion as well as extension of the knees with no tenderness, synovitis, warmth or redness. Patient otherwise has a normal joint exam without other evidence of joint tenderness, synovitis, warmth, erythema, decreased ROM, weakness or deformities. Radiology Reports Reviewed (if available):  Last 3 months  [unfilled]    Lab Reports Reviewed (if available): Last 3 months    Office Visit on 07/15/2022   Component Date Value Ref Range Status    Sodium 07/15/2022 142  136 - 145 mmol/L Final    Potassium 07/15/2022 4.4  3.5 - 5.1 mmol/L Final    Chloride 07/15/2022 109 (A) 98 - 107 mmol/L Final    CO2 07/15/2022 30  21 - 32 mmol/L Final    Anion Gap 07/15/2022 3 (A) 7 - 16 mmol/L Final    Glucose 07/15/2022 91  65 - 100 mg/dL Final    BUN 07/15/2022 20  8 - 23 MG/DL Final    CREATININE 07/15/2022 0.80  0.6 - 1.0 MG/DL Final    GFR  07/15/2022 >60  >60 ml/min/1.73m2 Final    GFR Non- 07/15/2022 >60  >60 ml/min/1.73m2 Final    Comment:   Estimated GFR is calculated using the Modification of Diet in Renal Disease (MDRD) Study equation, reported for both  Americans (GFRAA) and non- Americans (GFRNA), and normalized to 1.73m2 body surface area.  The physician must decide which value applies to the patient. The MDRD study equation should only be used in individuals age 25 or older. It has not been validated for the following: pregnant women, patients with serious comorbid conditions,or on certain medications, or persons with extremes of body size, muscle mass, or nutritional status.       Calcium 07/15/2022 9.6  8.3 - 10.4 MG/DL Final    Total Bilirubin 07/15/2022 0.4  0.2 - 1.1 MG/DL Final    ALT 07/15/2022 52  12 - 65 U/L Final    AST 07/15/2022 37  15 - 37 U/L Final    Alk Phosphatase 07/15/2022 112  50 - 136 U/L Final    Total Protein 07/15/2022 7.3  6.3 - 8.2 g/dL Final    Albumin 07/15/2022 4.0  3.2 - 4.6 g/dL Final    Globulin 07/15/2022 3.3  2.3 - 3.5 g/dL Final    Albumin/Globulin Ratio 07/15/2022 1.2  1.2 - 3.5   Final    WBC 07/15/2022 4.9  4.3 - 11.1 K/uL Final    RBC 07/15/2022 4.17  4.05 - 5.2 M/uL Final    Hemoglobin 07/15/2022 12.6  11.7 - 15.4 g/dL Final    Hematocrit 07/15/2022 41.4  35.8 - 46.3 % Final    MCV 07/15/2022 99.3 (A) 79.6 - 97.8 FL Final    MCH 07/15/2022 30.2  26.1 - 32.9 PG Final    MCHC 07/15/2022 30.4 (A) 31.4 - 35.0 g/dL Final    RDW 07/15/2022 12.8  11.9 - 14.6 % Final    Platelets 30/53/7327 114 (A) 150 - 450 K/uL Final    PERIPHERAL REVIEW TO FOLLOW    MPV 07/15/2022 12.1  9.4 - 12.3 FL Final    nRBC 07/15/2022 0.00  0.0 - 0.2 K/uL Final    **Note: Absolute NRBC parameter is now reported with Hemogram**    Seg Neutrophils 07/15/2022 64  43 - 78 % Final    Lymphocytes 07/15/2022 19  13 - 44 % Final    Monocytes 07/15/2022 12  4.0 - 12.0 % Final    Eosinophils % 07/15/2022 5  0.5 - 7.8 % Final    Basophils 07/15/2022 0  0.0 - 2.0 % Final    Immature Granulocytes 07/15/2022 0  0.0 - 5.0 % Final    Segs Absolute 07/15/2022 3.2  1.7 - 8.2 K/UL Final    Absolute Lymph # 07/15/2022 0.9  0.5 - 4.6 K/UL Final    Absolute Mono # 07/15/2022 0.6  0.1 - 1.3 K/UL Final    Absolute Eos # 07/15/2022 0.2  0.0 - 0.8 K/UL Final    Basophils Absolute 07/15/2022 0.0  0.0 - 0.2 K/UL Final    Absolute Immature Granulocyte 07/15/2022 0.0  0.0 - 0.5 K/UL Final    RBC Comment 07/15/2022 NORMOCYTIC/NORMOCHROMIC    Final    WBC Comment 07/15/2022 Result Confirmed By Smear    Final    Platelet Comment 81/07/1935 ADEQUATE    Final    Comment: OCCASIONAL  PLATELET AGGREGATES PRESENT  OCCASIONAL  LARGE FORMS PRESENT  PLT CLUMPS PRESENT      Differential Type 07/15/2022 AUTOMATED    Final         The results above were reviewed and discussed with patient. Assessment/Plan:   Sonny Santizo is a 66 y.o. female who presents with:     Systemic lupus erythematosus, unspecified SLE type, unspecified organ involvement status ([facility] [address]): She was instructed to continue hydroxychloroquine 200 mg 1 pill to be taken once a day after food. Last eye exam from August 2021 did not show any evidence of Plaquenil toxicity. While on hydroxychloroquine I did instruct her to keep up with yearly eye exams.  -     Anti-DNA Antibody, Double-Stranded; Future  -     Urinalysis with Reflex to Culture; Future  -     C4 Complement; Future  -     C3 Complement; Future  -     C3 Complement  -     C4 Complement  -     Urinalysis with Reflex to Culture  -     Anti-DNA Antibody, Double-Stranded    2. Raynaud's disease without gangrene: With regard to her symptoms of Raynaud's I did instruct her to keep her hands and her feet as warm as possible. 3. Long-term use of high-risk medication: If there is any noted abnormality I will keep the patient informed but if not I will review her labs with her on follow-up. -     CBC with Auto Differential; Future  -     Comprehensive Metabolic Panel; Future  -     Comprehensive Metabolic Panel  -     CBC with Auto Differential     Disease activity plan:  As stated above. Steroid management plan:  As stated above, if applicable. Pain management plan:  As stated above, if applicable.     Weight management plan:  Weight loss through diet and exercise is always encouraged    Disease prognosis: Good    I appreciate the opportunity to continue to participate in the care of this patient. Follow-up and Dispositions    Return in about 4 months (around 11/15/2022). Electronically signed by:  Radha Kim MD      This note was dictated using dragon voice recognition software.   It has been proofread, but there may still exist voice recognition errors that the author did not detect.                --------------------------------------------------------------------------------------------------------------------------------------------------------------------------------------------------------------------------------

## 2022-07-15 NOTE — PROGRESS NOTES
Kristi Schirmer, M.D.  [address] [place] [address], [address] [facility]  Office : [phone], Fax: [phone] OFFICE VISIT NOTE  Date of Visit:  7/15/2022 10:01 AM    Patient Information:  Name:  Nadine Foley  :  1950  Age:  66 y.o. Gender:  female      Ms. Zuleika Zhao is here today for follow-up of ***      Last visit: [unfilled]      History of Present Illness:    Since the last visit, patient is feeling \"***\".       /10  Location:  ***  Quality:  {:[id number]}  Modifying Factors:  ***  Associated Symptoms:   ; ***   /10         Last TB screen:  TB result:    Current dose of steroids: How long on current dose of steroids: How long on continuous steroid therapy:    Past DMARDs, if applicable (methotrexate, plaquenil/hydroxychloroquine, sulfasalazine, Arava/leflunomide):    Past biologics, if applicable (enbrel, humira, simponi, cimzia, xeljanz, orencia, remicade, simponi aria, actemra, rituximab, Plush Brielle, stelara, cosentyx):    Past NSAIDs, if applicable (motrin, aleve, naproxen, advil, ibuprofen, celebrex, voltaren/diclofenac, etc.):      Last BMD:  Past osteoporosis drugs, if applicable (fosamax, actonel, boniva, reclast, prolia, forteo):    BMI:  Current exercise regimen, if any:  Current vitamin D dose:  Current calcium dose:  Fractures since last visit, if any:         The patient otherwise has no significant interval changes in health or medical history to report. History Reviewed:    Past Medical History  Past Medical History:   Diagnosis Date    ADD (attention deficit disorder)     Rx daily    Arthritis     osteoarthritis in knees and hands    Depression     Essential hypertension 2017    controlled w/med    Interstitial lung disease due to connective tissue disease ([facility] [address]     followed by Kelley Nova. most recent OV 3/2019-ILD r/t SLE. seen annually w/CXR and Spirometry.     Menopause     Mitral valve prolapse     Sciatic nerve disease, right     SLE (systemic lupus erythematosus) ([facility] [address] 09/18/2017    followed by [facility] Rheumatology-most recent [address] 12/2019. SLE stable w/med    Unspecified sleep apnea     sleeps with c-pap       Past Surgical History  Past Surgical History:   Procedure Laterality Date    COLONOSCOPY  08/20/2013    with polypectomy    COLONOSCOPY N/A 6/15/2022    COLORECTAL CANCER SCREENING, NOT HIGH RISK/23 performed by Srinivas Bui MD at [facility]  2009    [address] Right 02/2020       Family History  Family History   Problem Relation Age of Onset    Post-op Nausea/Vomiting Neg Hx     Emergence Delirium Neg Hx     Pseudochol. Deficiency Neg Hx     Post-op Cognitive Dysfunction Neg Hx     Other Neg Hx     Breast Cancer Neg Hx     Heart Disease Sister     Stroke Mother     Cancer Father         Lung    Heart Disease Father         CAD/MI    Cancer Brother         cancer    Lung Disease Brother         COPD    Malig Hypertherm Neg Hx        Social History  Social History     Socioeconomic History    Marital status:      Spouse name: None    Number of children: None    Years of education: None    Highest education level: None   Tobacco Use    Smoking status: Never    Smokeless tobacco: Never   Substance and Sexual Activity    Alcohol use: Yes    Drug use: No   Social History Narrative    Denies any sexual or physical abuse and feels safe at home.                Allergy:  Allergies   Allergen Reactions    Sulfa Antibiotics Other (See Comments)     Visual dusturbances    Penicillins Rash         Current Medications:  Outpatient Encounter Medications as of 7/15/2022   Medication Sig Dispense Refill    acetaminophen (TYLENOL) 500 MG tablet Take 1,000 mg by mouth every 6 hours as needed      acyclovir (ZOVIRAX) 400 MG tablet Take 400 mg by mouth as needed       alclomethasone (ACLOVATE) 0.05 % cream Apply topically 2 times daily      amLODIPine (NORVASC) 10 MG tablet Take 10 mg by mouth daily      amphetamine-dextroamphetamine (ADDERALL) 30 MG tablet Take 1 tablet by mouth daily. vitamin D3 (CHOLECALCIFEROL) 10 MCG (400 UNIT) TABS tablet Take by mouth daily      diclofenac (VOLTAREN) 75 MG EC tablet Take 75 mg by mouth 2 times daily      DULoxetine (CYMBALTA) 30 MG extended release capsule Take 30 mg by mouth 2 times daily      estradiol (ESTRACE) 0.1 MG/GM vaginal cream Place 1 g vaginally Twice a Week      hydrocortisone 1 % cream Apply topically 2 times daily      hydroxychloroquine (PLAQUENIL) 200 MG tablet Take 1 pill once a day after food. losartan (COZAAR) 25 MG tablet Take 25 mg by mouth daily      nystatin (MYCOSTATIN) 226590 UNIT/GM cream Apply topically 2 times daily      omeprazole (PRILOSEC) 20 MG delayed release capsule Take 20 mg by mouth 2 times daily      rosuvastatin (CRESTOR) 10 MG tablet Take 10 mg by mouth      conjugated estrogens (PREMARIN) 0.625 MG/GM vaginal cream Place 0.5 g vaginally       No facility-administered encounter medications on file as of 7/15/2022.            REVIEW OF SYSTEMS: The following systems were reviewed with patient today and were negative except for the following (depicted with an \"X\"):        \"X\" General  \"X\" Head and Neck  \"X\" Heart and Breathing  \"X\" Gastrointestinal    Fever/chills   Hair loss   Shortness of breath   Upset stomach    Falls   Dry mouth   Coughing   Diarrhea / constipation    Wt loss   Mouth sores   Wheezing   Heartburn    Wt gain   Ringing ears   Chest pain   Dark or bloody stools    Night sweats   Diff. swallowing  X None of above   Nausea or vomiting   X None of above  X None of above     X None of above                \"X\" Skin  \"X\" Neurology  \"X\" Urinary/Gyn  \"X\" Other    Easy bruising   Numbness/ tingling   Female problems   Depression    Rashes   Weakness   Problems with urination   Feeling anxious    Sun sensitivity   Headaches  X None of above Problems sleeping   X None of above  X None of above     X None of above          Physical Exam:  Blood pressure 139/82, pulse 100, height 5' 7.5\" (1.715 m), weight 160 lb (72.6 kg). General:  Patient alert, cooperative and in no apparent distress. HEENT:  Heart:  Lungs:   Abdomen:  Skin:  No rashes. No nail abnormalities. Neurologic:  Oriented, normal speech and affect. Normal gait. Extremities:  No edema in bilateral lower extremities. Muskoskeletal Exam:     I examined the shoulders, elbows, wrists, MCPs, PIPs, DIPs and knees bilaterally for strength, range of motion, deformity, tenderness, swelling, and synovitis. The findings are:  No joint tenderness or synovitis. Physical Exam        [facility] Go to the Rheumatology activity and complete the [facility] joint exam.      Joint Exam 07/15/2022     No joint exam has been documented for this visit           cJADAS 10: --     Patient otherwise has a normal joint exam without other evidence of joint tenderness, synovitis, warmth, erythema, decreased ROM, weakness or deformities. Radiology Reports Reviewed (if available):  Last 3 months  [unfilled]    Lab Reports Reviewed (if available): Last 3 months    No visits with results within 3 Month(s) from this visit.    Latest known visit with results is:   Office Visit on 03/14/2022   Component Date Value Ref Range Status    WBC 03/14/2022 4.2  3.4 - 10.8 x10E3/uL Final    RBC 03/14/2022 4.12  3.77 - 5.28 x10E6/uL Final    Hemoglobin 03/14/2022 12.6  11.1 - 15.9 g/dL Final    Hematocrit 03/14/2022 38.5  34.0 - 46.6 % Final    MCV 03/14/2022 93  79 - 97 fL Final    MCH 03/14/2022 30.6  26.6 - 33.0 pg Final    MCHC 03/14/2022 32.7  31.5 - 35.7 g/dL Final    RDW 03/14/2022 11.9  11.7 - 15.4 % Final    Platelets 67/45/4429 164  150 - 450 x10E3/uL Final    Neutrophils % 03/14/2022 62  Not Estab. % Final    Lymphocytes % 03/14/2022 20  Not Estab. % Final    Monocytes % 03/14/2022 12  Not Estab. % Final    Eosinophils % 03/14/2022 5  Not Estab. % Final    Basophils % 03/14/2022 1  Not Estab. % Final    Neutrophils Absolute 03/14/2022 2.6  1.4 - 7.0 x10E3/uL Final    Lymphocytes Absolute 03/14/2022 0.8  0.7 - 3.1 x10E3/uL Final    Monocytes Absolute 03/14/2022 0.5  0.1 - 0.9 x10E3/uL Final    Eosinophils Absolute 03/14/2022 0.2  0.0 - 0.4 x10E3/uL Final    Basophils Absolute 03/14/2022 0.0  0.0 - 0.2 x10E3/uL Final    Immature Granulocytes 03/14/2022 0  Not Estab. % Final    GRANULOCYTE ABSOLUTE COUNT 03/14/2022 0.0  0.0 - 0.1 x10E3/uL Final    C3 COMPLEMENT,[id number] 03/14/2022 132  82 - 167 mg/dL Final    ANTI-DNA (DS) AB, QT 03/14/2022 9  0 - 9 IU/mL Final    Comment:                                    Negative      <5                                     Equivocal  5 - 9                                     Positive      >9      Glucose 03/14/2022 97  65 - 99 mg/dL Final    BUN 03/14/2022 20  8 - 27 mg/dL Final    CREATININE 03/14/2022 0.70  0.57 - 1.00 mg/dL Final    EGFR 03/14/2022 91  >59 mL/min/1.73 Final    Bun/Cre Ratio 03/14/2022 29 (A) 12 - 28 NA Final    Sodium 03/14/2022 144  134 - 144 mmol/L Final    Potassium 03/14/2022 4.4  3.5 - 5.2 mmol/L Final    Chloride 03/14/2022 106  96 - 106 mmol/L Final    CO2 03/14/2022 23  20 - 29 mmol/L Final    Calcium 03/14/2022 9.4  8.7 - 10.3 mg/dL Final    Total Protein 03/14/2022 6.7  6.0 - 8.5 g/dL Final    Albumin 03/14/2022 4.5  3.7 - 4.7 g/dL Final    Globulin, Total 03/14/2022 2.2  1.5 - 4.5 g/dL Final    Albumin/Globulin Ratio 03/14/2022 2.0  1.2 - 2.2 NA Final    Total Bilirubin 03/14/2022 0.4  0.0 - 1.2 mg/dL Final    Alkaline Phosphatase 03/14/2022 95  44 - 121 IU/L Final    AST 03/14/2022 22  0 - 40 IU/L Final    ALT 03/14/2022 18  0 - 32 IU/L Final    C4 COMPLEMENT,[id number] 03/14/2022 23  12 - 38 mg/dL Final         The results above were reviewed and discussed with patient.          Assessment: Maria Luisa Head is a 66 y.o. female who presents with:       {ASSESSMENT/PLAN:16090}      ***    There are no Patient Instructions on file for this visit. Disease activity plan:  As stated above. Steroid management plan:  As stated above, if applicable. Pain management plan:  As stated above, if applicable. Weight management plan:  Weight loss through diet and exercise is always encouraged    Disease prognosis: Good        I appreciate the opportunity to continue to participate in the care of this patient. Electronically signed by:  Teresa Islas MD      This note was dictated using dragon voice recognition software.   It has been proofread, but there may still exist voice recognition errors that the author did not detect.                --------------------------------------------------------------------------------------------------------------------------------------------------------------------------------------------------------------------------------

## 2022-07-16 LAB
BASOPHILS # BLD: 0 K/UL (ref 0–0.2)
BASOPHILS NFR BLD: 0 % (ref 0–2)
DIFFERENTIAL METHOD BLD: ABNORMAL
EOSINOPHIL # BLD: 0.2 K/UL (ref 0–0.8)
EOSINOPHIL NFR BLD: 5 % (ref 0.5–7.8)
ERYTHROCYTE [DISTWIDTH] IN BLOOD BY AUTOMATED COUNT: 12.8 % (ref 11.9–14.6)
HCT VFR BLD AUTO: 41.4 % (ref 35.8–46.3)
HGB BLD-MCNC: 12.6 G/DL (ref 11.7–15.4)
IMM GRANULOCYTES # BLD AUTO: 0 K/UL (ref 0–0.5)
IMM GRANULOCYTES NFR BLD AUTO: 0 % (ref 0–5)
LYMPHOCYTES # BLD: 0.9 K/UL (ref 0.5–4.6)
LYMPHOCYTES NFR BLD: 19 % (ref 13–44)
MCH RBC QN AUTO: 30.2 PG (ref 26.1–32.9)
MCHC RBC AUTO-ENTMCNC: 30.4 G/DL (ref 31.4–35)
MCV RBC AUTO: 99.3 FL (ref 79.6–97.8)
MONOCYTES # BLD: 0.6 K/UL (ref 0.1–1.3)
MONOCYTES NFR BLD: 12 % (ref 4–12)
NEUTS SEG # BLD: 3.2 K/UL (ref 1.7–8.2)
NEUTS SEG NFR BLD: 64 % (ref 43–78)
NRBC # BLD: 0 K/UL (ref 0–0.2)
PLATELET # BLD AUTO: 114 K/UL (ref 150–450)
PLATELET COMMENT: ADEQUATE
PMV BLD AUTO: 12.1 FL (ref 9.4–12.3)
RBC # BLD AUTO: 4.17 M/UL (ref 4.05–5.2)
RBC MORPH BLD: ABNORMAL
WBC # BLD AUTO: 4.9 K/UL (ref 4.3–11.1)
WBC MORPH BLD: ABNORMAL

## 2022-07-18 LAB — DSDNA AB SER-ACNC: 10 IU/ML (ref 0–9)

## 2022-07-19 LAB
C3 SERPL-MCNC: 160 MG/DL (ref 82–167)
C4 SERPL-MCNC: 27 MG/DL (ref 12–38)

## 2022-08-04 ENCOUNTER — APPOINTMENT (OUTPATIENT)
Dept: MAMMOGRAPHY | Age: 72
End: 2022-08-04
Payer: MEDICARE

## 2022-08-04 ENCOUNTER — HOSPITAL ENCOUNTER (OUTPATIENT)
Dept: MAMMOGRAPHY | Age: 72
Discharge: HOME | End: 2022-08-07
Payer: MEDICARE

## 2022-08-04 DIAGNOSIS — Z78.0 POSTMENOPAUSAL: ICD-10-CM

## 2022-08-04 DIAGNOSIS — Z12.31 ENCOUNTER FOR SCREENING MAMMOGRAM FOR MALIGNANT NEOPLASM OF BREAST: ICD-10-CM

## 2022-08-04 PROCEDURE — 77067 SCR MAMMO BI INCL CAD: CPT

## 2022-08-04 PROCEDURE — 77080 DXA BONE DENSITY AXIAL: CPT

## 2022-08-15 RX ORDER — HYDROXYCHLOROQUINE SULFATE 200 MG/1
TABLET, FILM COATED ORAL
Qty: 90 TABLET | Refills: 1 | OUTPATIENT
Start: 2022-08-15

## 2022-08-22 ENCOUNTER — HOSPITAL ENCOUNTER (OUTPATIENT)
Dept: GENERAL RADIOLOGY | Age: 72
Discharge: HOME | End: 2022-08-25
Payer: MEDICARE

## 2022-08-22 DIAGNOSIS — J84.9 ILD (INTERSTITIAL LUNG DISEASE) (HCC): ICD-10-CM

## 2022-08-22 PROCEDURE — 71046 X-RAY EXAM CHEST 2 VIEWS: CPT

## 2022-08-24 DIAGNOSIS — J84.9 ILD (INTERSTITIAL LUNG DISEASE) (HCC): Primary | ICD-10-CM

## 2022-08-25 ENCOUNTER — OFFICE VISIT (OUTPATIENT)
Dept: PULMONOLOGY | Age: 72
End: 2022-08-25
Payer: MEDICARE

## 2022-08-25 VITALS
SYSTOLIC BLOOD PRESSURE: 120 MMHG | HEIGHT: 68 IN | DIASTOLIC BLOOD PRESSURE: 80 MMHG | BODY MASS INDEX: 23.49 KG/M2 | OXYGEN SATURATION: 95 % | HEART RATE: 93 BPM | TEMPERATURE: 98 F | WEIGHT: 155 LBS | RESPIRATION RATE: 20 BRPM

## 2022-08-25 DIAGNOSIS — M35.9 SYSTEMIC INVOLVEMENT OF CONNECTIVE TISSUE, UNSPECIFIED (HCC): ICD-10-CM

## 2022-08-25 DIAGNOSIS — J84.9 ILD (INTERSTITIAL LUNG DISEASE) (HCC): Primary | ICD-10-CM

## 2022-08-25 PROCEDURE — 1123F ACP DISCUSS/DSCN MKR DOCD: CPT | Performed by: INTERNAL MEDICINE

## 2022-08-25 PROCEDURE — 99213 OFFICE O/P EST LOW 20 MIN: CPT | Performed by: INTERNAL MEDICINE

## 2022-08-25 NOTE — PROGRESS NOTES
Patient Name:  Nadeem Cherry                             YOB: 1950  MRN: [id number]                                              Office Visit 8/25/2022    ASSESSMENT AND PLAN:  (Medical Decision Making)    Pt with history of ILD related to SLE. Fortunately has remained mild and stable and has not required therapy, just monitoring.     -will get repeat cPFT\"s now.   -cxr was stable. Will repeat at next appt. -spirometry in 1 year with next appt as well.   -having some symptoms of indigestion or GERD. Encouraged her to f/u with her gastroenterologist if this persists. Already on bid omeprazole. There are no diagnoses linked to this encounter. Madeline Phalen, MD    Total time for encounter on day of encounter was 20 minutes. This time includes chart prep, review of tests/procedures, review of other provider's notes, documentation and counseling patient regarding disease process and medications. _________________________________________________________________________    HISTORY OF PRESENT ILLNESS:    Ms. Brandy Linda in our clinic today who presents with Other (ILD)   She has a history of ADD, KINGA, ILD (UIP pattern on CT) felt to be related to SLE. So far disease has been stable without specific therapy. Returns today for follow up appointment. She had a repeat CXR that was stable. Last spirometry in May 2021 was stable. She states that the past year has been stable for her breathing until the last week or two. Now she is noticing some pain in the center of her chest that she thinks may be acid reflux. For a while it was present all day. She was taking 20mg omeprazole twice a day. Sometimes when she went to bed she would feel like something was stuck in her chest. Soda would help. Otherwise she feels like her breathing has been stable. Denies any LE swelling, palpitations. Just on plaquenil for SLE. No flares of symptoms. Well controlled.        REVIEW OF SYSTEMS: 10 point review of systems is negative except as reported in HPI. PHYSICAL EXAM: Body mass index is 23.92 kg/m². Vitals:    08/25/22 1457   BP: 120/80   Pulse: 93   Resp: 20   Temp: 98 °F (36.7 °C)   TempSrc: Temporal   SpO2: 95%   Weight: 155 lb (70.3 kg)   Height: 5' 7.5\" (1.715 m)     Physical Exam is normal except:   Gen - NAD  Resp - mild bibasilar inspiratory crackles  CV - RRR, no m/r/g. No le edema. DIAGNOSTIC TESTS:                                                                                    LABS:   Lab Results   Component Value Date/Time    WBC 4.9 07/15/2022 10:29 AM    HGB 12.6 07/15/2022 10:29 AM    HCT 41.4 07/15/2022 10:29 AM     07/15/2022 10:29 AM    KYLE Positive 08/15/2017 10:20 AM    CRP 1 04/23/2020 11:05 AM     Imaging: I performed an independent interpretation of the patient's images. CXR:   XR CHEST STANDARD TWO VW 08/22/2022    Narrative  Exam: XR CHEST (2 VW) on 8/22/2022 1:51 PM    Clinical History: The Female patient is 66years old  presenting for  Interstitial pulmonary disease, unspecified. Comparison:  Chest x-ray 5/7/2021    Findings:  Frontal and lateral views of the chest were obtained. There is chronic basilar scarring associated with COPD. No pleural effusions  are seen. The cardiomediastinal silhouette is within normal limits. There are  no acute osseous abnormalities. Impression  1. COPD. CPT code(s) 32034    8/22/22      CT Chest:   CT CHEST WO CONTRAST 08/03/2017    Narrative  CT CHEST WITHOUT CONTRAST (HRCT) DATED 8/3/2017. History: Chronic cough since April. Pneumonia in April. .    Comparison: None. Technique:   Multiple contiguous helical CT images reconstructed at 5 mm were  obtained from the neck base to the mid abdomen without the administration of  contrast. Additional noncontiguous, thin section, axial high-resolution CT  images were obtained with the patient in a prone and supine position.  All CT  scans performed at this facility use one or all of the following: Automated  exposure control, adjustment of the mA and/or kVp according to patient's size,  iterative reconstruction. Findings:  CT Chest:  The base of the neck is unremarkable in appearance. No axillary, or mediastinal  lymphadenopathy is seen. Evaluation of the gene is limited due to noncontrast  technique although no obvious bulky hilar changes are seen. The thoracic aorta  is normal in caliber. Evaluation with lung windows demonstrates no acute infiltrates. No worrisome  nodules, or masses are seen. Evaluation of high resolution images is  unremarkable for abnormal reticular interstitial prominence particularly at the  lung bases. Associated groundglass opacities are seen which do not improve with  prone positioning. In addition, mild cylindrical bronchiectasis is seen  extending into these areas in the lower lobes. Given the basilar predominance,  processes such as IPF, or NSIP is favored. No pleural effusion is seen. Lungs  are expanded without evidence for pneumothorax. Limited evaluation of the upper abdomen demonstrates no acute abnormality. A  hepatic calcification is seen which may be related to chronic pneumonitis  infection. The adrenal glands are unremarkable in appearance. Impression  IMPRESSION:  1. No acute or worrisome findings. Only chronic appearing interstitial changes  are seen as described above. 8/3/17      Nuclear Medicine: No results found for this or any previous visit from the past 3650 days. PFTs:   No flowsheet data found. No results found for this or any previous visit. No results found for this or any previous visit. FeNO: No results found for this or any previous visit. Exercise Oximetry: No results found for this or any previous visit. Echo: No results found for this or any previous visit from the past 3650 days.     [facility] Reference Info: Past Medical History:   Diagnosis Date    ADD (attention deficit disorder)     Rx daily    Arthritis     osteoarthritis in knees and hands    Depression     Essential hypertension 01/05/2017    controlled w/med    Interstitial lung disease due to connective tissue disease ([facility]     followed by Rey Olivier. most recent OV 3/2019-ILD r/t SLE. seen annually w/CXR and Spirometry. Menopause     Mitral valve prolapse     Sciatic nerve disease, right     SLE (systemic lupus erythematosus) ([facility] 09/18/2017    followed by [address] [place] [address] Rheumatology-most recent [address] 12/2019. SLE stable w/med    Unspecified sleep apnea     sleeps with c-pap      Tobacco Use: Low Risk     Smoking Tobacco Use: Never    Smokeless Tobacco Use: Never     Allergies   Allergen Reactions    Sulfa Antibiotics Other (See Comments)     Visual dusturbances    Penicillins Rash     Current Outpatient Medications   Medication Instructions    acetaminophen (TYLENOL) 1,000 mg, Oral, EVERY 6 HOURS PRN    acyclovir (ZOVIRAX) 400 mg, Oral, AS NEEDED    alclomethasone (ACLOVATE) 0.05 % cream Topical, 2 TIMES DAILY    amLODIPine (NORVASC) 10 mg, Oral, DAILY    amphetamine-dextroamphetamine (ADDERALL) 30 MG tablet 1 tablet, Oral, DAILY    conjugated estrogens (PREMARIN) 0.5 g, Vaginal    diclofenac (VOLTAREN) 75 mg, Oral, 2 TIMES DAILY    DULoxetine (CYMBALTA) 30 mg, Oral, 2 TIMES DAILY    estradiol (ESTRACE) 1 g, Vaginal, TWICE WEEKLY    hydrocortisone 1 % cream Topical, 2 TIMES DAILY    hydroxychloroquine (PLAQUENIL) 200 MG tablet Take 1 pill once a day after food.     losartan (COZAAR) 25 mg, Oral, DAILY    nystatin (MYCOSTATIN) 023782 UNIT/GM cream Topical, 2 TIMES DAILY    omeprazole (PRILOSEC) 20 mg, Oral, 2 TIMES DAILY    rosuvastatin (CRESTOR) 10 mg, Oral    vitamin D3 (CHOLECALCIFEROL) 10 MCG (400 UNIT) TABS tablet Oral, DAILY

## 2022-10-06 ENCOUNTER — NURSE ONLY (OUTPATIENT)
Dept: PULMONOLOGY | Age: 72
End: 2022-10-06
Payer: MEDICARE

## 2022-10-06 DIAGNOSIS — J84.9 ILD (INTERSTITIAL LUNG DISEASE) (HCC): Primary | ICD-10-CM

## 2022-10-06 LAB
FEF25-27, POC: 2.44 L/S
FET, POC: NORMAL
FEV 1 , POC: 2.26 L
FEV1/FVC, POC: NORMAL
FVC, POC: NORMAL
LUNG AGE, POC: NORMAL
PEF, POC: 4.86 L/S

## 2022-10-06 PROCEDURE — 94726 PLETHYSMOGRAPHY LUNG VOLUMES: CPT | Performed by: INTERNAL MEDICINE

## 2022-10-06 PROCEDURE — 94010 BREATHING CAPACITY TEST: CPT | Performed by: INTERNAL MEDICINE

## 2022-10-06 PROCEDURE — 94729 DIFFUSING CAPACITY: CPT | Performed by: INTERNAL MEDICINE

## 2022-10-07 ENCOUNTER — TELEPHONE (OUTPATIENT)
Dept: PULMONOLOGY | Age: 72
End: 2022-10-07

## 2022-10-07 NOTE — TELEPHONE ENCOUNTER
Spoke with the patient in regards to their CPFT results, explained per Dr. [facility] that the breathing tests were actually improved to those form 2017 which is great news and that we will keep our current follow up plan unless she needs to be seen sooner. Patient understood the results and did not have any further questions or concerns at this time. // Radha ALBARRAN

## 2022-10-07 NOTE — TELEPHONE ENCOUNTER
----- Message from Almaz Dalton MD sent at 10/6/2022 11:34 AM EDT -----  Can you let the patient know that her breathing tests were actually improved to those from 2017 which is great news. We will keep our current follow up plan unless she needs us sooner.      Almaz Dalton MD

## 2022-11-15 ENCOUNTER — OFFICE VISIT (OUTPATIENT)
Dept: RHEUMATOLOGY | Age: 72
End: 2022-11-15
Payer: MEDICARE

## 2022-11-15 VITALS
HEIGHT: 68 IN | BODY MASS INDEX: 24.37 KG/M2 | RESPIRATION RATE: 18 BRPM | WEIGHT: 160.8 LBS | HEART RATE: 96 BPM | DIASTOLIC BLOOD PRESSURE: 70 MMHG | SYSTOLIC BLOOD PRESSURE: 130 MMHG

## 2022-11-15 DIAGNOSIS — Z79.899 LONG-TERM USE OF HIGH-RISK MEDICATION: ICD-10-CM

## 2022-11-15 DIAGNOSIS — I73.00 RAYNAUD'S DISEASE WITHOUT GANGRENE: ICD-10-CM

## 2022-11-15 DIAGNOSIS — M32.9 SYSTEMIC LUPUS ERYTHEMATOSUS, UNSPECIFIED SLE TYPE, UNSPECIFIED ORGAN INVOLVEMENT STATUS (HCC): Primary | ICD-10-CM

## 2022-11-15 LAB
ALBUMIN SERPL-MCNC: 3.9 G/DL (ref 3.2–4.6)
ALBUMIN/GLOB SERPL: 1.3 {RATIO} (ref 0.4–1.6)
ALP SERPL-CCNC: 116 U/L (ref 50–136)
ALT SERPL-CCNC: 33 U/L (ref 12–65)
ANION GAP SERPL CALC-SCNC: 3 MMOL/L (ref 2–11)
AST SERPL-CCNC: 26 U/L (ref 15–37)
BASOPHILS # BLD: 0 K/UL (ref 0–0.2)
BASOPHILS NFR BLD: 1 % (ref 0–2)
BILIRUB SERPL-MCNC: 0.4 MG/DL (ref 0.2–1.1)
BUN SERPL-MCNC: 17 MG/DL (ref 8–23)
CALCIUM SERPL-MCNC: 9.3 MG/DL (ref 8.3–10.4)
CHLORIDE SERPL-SCNC: 110 MMOL/L (ref 101–110)
CO2 SERPL-SCNC: 31 MMOL/L (ref 21–32)
CREAT SERPL-MCNC: 0.8 MG/DL (ref 0.6–1)
DIFFERENTIAL METHOD BLD: ABNORMAL
EOSINOPHIL # BLD: 0.3 K/UL (ref 0–0.8)
EOSINOPHIL NFR BLD: 6 % (ref 0.5–7.8)
ERYTHROCYTE [DISTWIDTH] IN BLOOD BY AUTOMATED COUNT: 13 % (ref 11.9–14.6)
GLOBULIN SER CALC-MCNC: 3.1 G/DL (ref 2.8–4.5)
GLUCOSE SERPL-MCNC: 81 MG/DL (ref 65–100)
HCT VFR BLD AUTO: 39 % (ref 35.8–46.3)
HGB BLD-MCNC: 12.3 G/DL (ref 11.7–15.4)
IMM GRANULOCYTES # BLD AUTO: 0 K/UL (ref 0–0.5)
IMM GRANULOCYTES NFR BLD AUTO: 0 % (ref 0–5)
LYMPHOCYTES # BLD: 0.9 K/UL (ref 0.5–4.6)
LYMPHOCYTES NFR BLD: 20 % (ref 13–44)
MCH RBC QN AUTO: 30.1 PG (ref 26.1–32.9)
MCHC RBC AUTO-ENTMCNC: 31.5 G/DL (ref 31.4–35)
MCV RBC AUTO: 95.6 FL (ref 82–102)
MONOCYTES # BLD: 0.7 K/UL (ref 0.1–1.3)
MONOCYTES NFR BLD: 15 % (ref 4–12)
NEUTS SEG # BLD: 2.7 K/UL (ref 1.7–8.2)
NEUTS SEG NFR BLD: 58 % (ref 43–78)
NRBC # BLD: 0 K/UL (ref 0–0.2)
PLATELET # BLD AUTO: 152 K/UL (ref 150–450)
PMV BLD AUTO: 12.3 FL (ref 9.4–12.3)
POTASSIUM SERPL-SCNC: 4.4 MMOL/L (ref 3.5–5.1)
PROT SERPL-MCNC: 7 G/DL (ref 6.3–8.2)
RBC # BLD AUTO: 4.08 M/UL (ref 4.05–5.2)
SODIUM SERPL-SCNC: 144 MMOL/L (ref 133–143)
WBC # BLD AUTO: 4.6 K/UL (ref 4.3–11.1)

## 2022-11-15 PROCEDURE — 99214 OFFICE O/P EST MOD 30 MIN: CPT | Performed by: INTERNAL MEDICINE

## 2022-11-15 PROCEDURE — 3078F DIAST BP <80 MM HG: CPT | Performed by: INTERNAL MEDICINE

## 2022-11-15 PROCEDURE — 3074F SYST BP LT 130 MM HG: CPT | Performed by: INTERNAL MEDICINE

## 2022-11-15 PROCEDURE — 1123F ACP DISCUSS/DSCN MKR DOCD: CPT | Performed by: INTERNAL MEDICINE

## 2022-11-15 RX ORDER — HYDROXYCHLOROQUINE SULFATE 200 MG/1
TABLET, FILM COATED ORAL
Qty: 90 TABLET | Refills: 1 | Status: SHIPPED | OUTPATIENT
Start: 2022-11-15

## 2022-11-15 ASSESSMENT — ROUTINE ASSESSMENT OF PATIENT INDEX DATA (RAPID3)
ON A SCALE OF ONE TO TEN, HOW DIFFICULT WAS IT FOR YOU TO COMPLETE THE LISTED DAILY PHYSICAL TASKS OVER THE LAST WEEK: 0.4
ON A SCALE OF ONE TO TEN, HOW MUCH PAIN HAVE YOU HAD BECAUSE OF YOUR CONDITION OVER THE PAST WEEK?: 3
ON A SCALE OF ONE TO TEN, HOW MUCH OF A PROBLEM HAS UNUSUAL FATIGUE OR TIREDNESS BEEN FOR YOU OVER THE PAST WEEK?: 5
ON A SCALE OF ONE TO TEN, CONSIDERING ALL THE WAYS IN WHICH ILLNESS AND HEALTH CONDITIONS MAY AFFECT YOU AT THIS TIME, PLEASE INDICATE BELOW HOW YOU ARE DOING:: 3
WHEN YOU AWAKENED IN THE MORNING OVER THE LAST WEEK, PLEASE INDICATE THE AMOUNT OF TIME IT TAKES UNTIL YOU ARE AS LIMBER AS YOU WILL BE FOR THE DAY: < 10 MIN

## 2022-11-15 ASSESSMENT — JOINT PAIN
TOTAL NUMBER OF SWOLLEN JOINTS: 0
TOTAL NUMBER OF TENDER JOINTS: 8

## 2022-11-15 NOTE — PROGRESS NOTES
Jaqueline Singleton M.D.  [address] [place] [address], [address]   Office : [phone], Fax: [phone] OFFICE VISIT NOTE  Date of Visit:  11/15/2022 9:46 AM    Patient Information:  Name:  Jon Horne  :  1950  Age:  74 y.o. Gender:  female      Ms. Layla Gonzalez is here today for follow-up of systemic lupus erythematosus and raynaud's      Last visit: 7/15/22      History of Present Illness: On talking to the patient today she states that she has had a cold and took Coricidin and Muccinex to help her symptoms. Is taking Omeprazole 20 mg once a day for her GERD symptoms which was recently started by her PCP. She does complain of some pain when she takes a deep breath. She has also been having pain radiating down her right leg. She has had to get a new eye doctor and has an appointment to see the new eye doctor in January. I did instruct her to mention to the eye doctor that she is on Plaquenil. Patient did verbalize understanding. Since the last visit, patient is feeling \"very good\". Pain: 3/10  Location:  Some lower back with tingling and pain with no numbness down the right leg from the right hip to the right foot. Some neck stiffness with no pain with neck ROM. No headaches. Some wrist pain bilaterally and swelling with no redness with no warmth. Some pain with swelling of the MCP and PIP joints with no warmth and redness. Bilateral thumb pain with some swelling with no warmth and redness. Bilateral knee pain with some swelling of the right knee with no warmth and redness. No buckling of either knee. Quality:  Sharp pain. Modifying Factors:  1st thing in the morning the pain and stiffness is the worst.   Associated Symptoms:  Some right LE weakness. Needs occasional help with opening jars. No tingling, numbness or pain down the arms.         DMARD/Biologic 11/15/2022   AM Stiffness < 10 min   Pain 3   Fatigue 5   MDHAQ 0.4   Patient Global Score 3   Medication Name Plaquenil   Dose 200mg     Last TB screen:  2009  TB result: Skin (+); X-Ray (-)       Current dose of steroids: None  How long on current dose of steroids: NA  How long on continuous steroid therapy: NA       Past DMARDs, if applicable (methotrexate, plaquenil/hydroxychloroquine, sulfasalazine, Arava/leflunomide): Currently on Plaquenil 200 mg QD. Past biologics, if applicable  (enbrel, humira, simponi, cimzia, Samanta Getting, HANSON, remicade, simponi aria, actemra, rituximab, Leveda Gemma, stelara, cosentyx): None       Past NSAIDs, if applicable (motrin, aleve, naproxen, advil, ibuprofen, celebrex, voltaren/diclofenac, etc.):  Motrin/Ibuprofen, Naproxen in the past.  Currently takes Diclofenac 75 mg BID. Currently on Tylenol 325 mg PRN. Last BMD: 8/4/22  Past osteoporosis drugs, if applicable (fosamax, actonel, boniva, reclast, prolia, forteo): None        BMI: 24.81       Current exercise regimen, if any: none  Current vitamin D dose: D3- 400 QD  Current calcium dose: 315 mg QD  Fractures since last visit, if any: None      The patient otherwise has no significant interval changes in health or medical history to report. History Reviewed:    Past Medical History  Past Medical History:   Diagnosis Date    ADD (attention deficit disorder)     Rx daily    Arthritis     osteoarthritis in knees and hands    Depression     Essential hypertension 01/05/2017    controlled w/med    Interstitial lung disease due to connective tissue disease ([facility]     followed by Diana Patrick. most recent OV 3/2019-ILD r/t SLE. seen annually w/CXR and Spirometry. Menopause     Mitral valve prolapse     Sciatic nerve disease, right     SLE (systemic lupus erythematosus) ([facility] 09/18/2017    followed by [facility] Rheumatology-most recent [address] 12/2019.  SLE stable w/med    Unspecified sleep apnea     sleeps with c-pap       Past Surgical History  Past Surgical History:   Procedure Laterality Date COLONOSCOPY  08/20/2013    with polypectomy    COLONOSCOPY N/A 6/15/2022    COLORECTAL CANCER SCREENING, NOT HIGH RISK/23 performed by Monroe Valencia MD at [facility]  2009    [address] 02/2020       Family History  Family History   Problem Relation Age of Onset    Post-op Nausea/Vomiting Neg Hx     Emergence Delirium Neg Hx     Pseudochol. Deficiency Neg Hx     Post-op Cognitive Dysfunction Neg Hx     Other Neg Hx     Breast Cancer Neg Hx     Heart Disease Sister     Stroke Mother     Cancer Father         Lung    Heart Disease Father         CAD/MI    Cancer Brother         cancer    Lung Disease Brother         COPD    Malig Hypertherm Neg Hx        Social History  Social History     Socioeconomic History    Marital status:      Spouse name: None    Number of children: None    Years of education: None    Highest education level: None   Tobacco Use    Smoking status: Never    Smokeless tobacco: Never   Substance and Sexual Activity    Alcohol use: Never    Drug use: No   Social History Narrative    Denies any sexual or physical abuse and feels safe at home. Allergy:  Allergies   Allergen Reactions    Sulfa Antibiotics Other (See Comments)     Visual dusturbances    Penicillins Rash         Current Medications:  Outpatient Encounter Medications as of 11/15/2022   Medication Sig Dispense Refill    Calcium Carbonate-Vitamin D (CALTRATE 600+D PO) Take 1 tablet by mouth      hydroxychloroquine (PLAQUENIL) 200 MG tablet Take 1 pill once a day after food.  90 tablet 1    acetaminophen (TYLENOL) 500 MG tablet Take 1,000 mg by mouth every 6 hours as needed      acyclovir (ZOVIRAX) 400 MG tablet Take 400 mg by mouth as needed       alclomethasone (ACLOVATE) 0.05 % cream Apply topically 2 times daily      amLODIPine (NORVASC) 10 MG tablet Take 10 mg by mouth daily      vitamin D3 (CHOLECALCIFEROL) 10 MCG (400 UNIT) TABS tablet Take by mouth daily      diclofenac (VOLTAREN) 75 MG EC tablet Take 75 mg by mouth 2 times daily      DULoxetine (CYMBALTA) 30 MG extended release capsule Take 30 mg by mouth 2 times daily      estradiol (ESTRACE) 0.1 MG/GM vaginal cream Place 1 g vaginally Twice a Week      hydrocortisone 1 % cream Apply topically 2 times daily      losartan (COZAAR) 25 MG tablet Take 25 mg by mouth daily      nystatin (MYCOSTATIN) 530831 UNIT/GM cream Apply topically 2 times daily      omeprazole (PRILOSEC) 20 MG delayed release capsule Take 20 mg by mouth Daily      rosuvastatin (CRESTOR) 10 MG tablet Take 10 mg by mouth      amphetamine-dextroamphetamine (ADDERALL) 30 MG tablet Take 1 tablet by mouth daily. conjugated estrogens (PREMARIN) 0.625 MG/GM vaginal cream Place 0.5 g vaginally      [DISCONTINUED] hydroxychloroquine (PLAQUENIL) 200 MG tablet Take 1 pill once a day after food. No facility-administered encounter medications on file as of 11/15/2022.            REVIEW OF SYSTEMS: The following systems were reviewed with patient today and were negative except for the following (depicted with an \"X\"):        \"X\" General  \"X\" Head and Neck  \"X\" Heart and Breathing  \"X\" Gastrointestinal    Fever/chills   Hair loss   Shortness of breath   Upset stomach    Falls   Dry mouth  x Coughing   Diarrhea / constipation    Wt loss   Mouth sores   Wheezing  x Heartburn    Wt gain  x Ringing ears   Chest pain   Dark or bloody stools    Night sweats   Diff. swallowing   None of above   Nausea or vomiting   X None of above   None of above      None of above                \"X\" Skin  \"X\" Neurology  \"X\" Urinary/Gyn  \"X\" Other    Easy bruising   Numbness/ tingling   Female problems   Depression    Rashes   Weakness   Problems with urination   Feeling anxious    Sun sensitivity   Headaches  X None of above   Problems sleeping   X None of above  X None of above     X None of above Physical Exam:  Blood pressure 130/70, pulse 96, resp. rate 18, height 5' 7.5\" (1.715 m), weight 160 lb 12.8 oz (72.9 kg). General:  Patient alert, cooperative and in no apparent distress. HEENT: Pupils equally reactive to light and accomodation, no scleral injection noted. Heart: Regular rate and rhythm, normal S1 and S2 with no rubs or gallops. Lungs: Clear to auscultation bilaterally with no audible wheeze or rhonchi. Abdomen: Soft, nontender, no hepatosplenomegaly. Skin:  No rashes. No nail abnormalities. Neurologic:  Oriented, normal speech and affect. Normal gait. Extremities:  No edema in bilateral lower extremities with no cyanosis or clubbing. Muskoskeletal Exam:     I examined the shoulders, elbows, wrists, MCPs, PIPs, DIPs and knees bilaterally for strength, range of motion, deformity, tenderness, swelling, and synovitis. The findings are:         Physical Exam              Joint Exam 11/15/2022        Right  Left   CMC   Tender   Tender   PIP 2   Tender   Tender   PIP 3   Tender   Tender   PIP 4   Tender   Tender   PIP 5   Tender   Tender   Lumbar Spine   Tender      Sacroiliac   Tender        Patient otherwise has a normal joint exam without other evidence of joint tenderness, synovitis, warmth, erythema, decreased ROM, weakness or deformities. Radiology Reports Reviewed (if available):  Last 3 months  [unfilled]    Lab Reports Reviewed (if available): Last 3 months    Nurse Only on 10/06/2022   Component Date Value Ref Range Status    FEV 1 , POC 10/06/2022 2.26  L Final    FVC, POC 10/06/2022 2.71 L   Final    PEF, POC 10/06/2022 4.86  L/s Final    FEV1/FVC, POC 10/06/2022 83%   Final    [id number], POC 10/06/2022 2.44  L/s Final         The results above were reviewed and discussed with patient.          Assessment/Plan:   Zhao Abebe is a 74 y.o. female who presents with:     Systemic lupus erythematosus, unspecified SLE type, unspecified organ involvement status [facility]): Patient was instructed to continue hydroxychloroquine 200 mg to be taken once a day after food. While on hydroxychloroquine patient is aware that she would need to keep up with yearly eye exams. She does have an upcoming eye appointment in January that she was instructed to keep. -     hydroxychloroquine (PLAQUENIL) 200 MG tablet; Take 1 pill once a day after food. -     C4 Complement; Future  -     Anti-DNA Antibody, Double-Stranded; Future  -     Urinalysis with Reflex to Culture; Future  -     C3 Complement; Future  -     C3 Complement  -     Urinalysis with Reflex to Culture  -     Anti-DNA Antibody, Double-Stranded  -     C4 Complement    Raynaud's disease without gangrene: I did instruct her to keep her hands and her feet as warm as possible to help with her Raynaud's. Long-term use of high-risk medication: If there is any noted abnormality I will keep the patient informed but if not I will review her labs with her on follow-up. -     Comprehensive Metabolic Panel; Future  -     CBC with Auto Differential; Future  -     CBC with Auto Differential  -     Comprehensive Metabolic Panel     Disease activity plan:  As stated above. Steroid management plan:  As stated above, if applicable. Pain management plan:  As stated above, if applicable. Weight management plan:  Weight loss through diet and exercise is always encouraged    Disease prognosis: Good    I appreciate the opportunity to continue to participate in the care of this patient. Follow-up and Dispositions    Return in about 4 months (around 3/15/2023). Electronically signed by:  Zena Culver MD      This note was dictated using dragon voice recognition software.   It has been proofread, but there may still exist voice recognition errors that the author did not detect.                --------------------------------------------------------------------------------------------------------------------------------------------------------------------------------------------------------------------------------

## 2022-11-16 LAB
APPEARANCE UR: CLEAR
BACTERIA URNS QL MICRO: NEGATIVE /HPF
BILIRUB UR QL: NEGATIVE
CASTS URNS QL MICRO: ABNORMAL /LPF (ref 0–2)
COLOR UR: YELLOW
EPI CELLS #/AREA URNS HPF: ABNORMAL /HPF (ref 0–5)
GLUCOSE UR STRIP.AUTO-MCNC: NEGATIVE MG/DL
HGB UR QL STRIP: NEGATIVE
KETONES UR QL STRIP.AUTO: NEGATIVE MG/DL
LEUKOCYTE ESTERASE UR QL STRIP.AUTO: NEGATIVE
MUCOUS THREADS URNS QL MICRO: 0 /LPF
NITRITE UR QL STRIP.AUTO: NEGATIVE
PH UR STRIP: 6 [PH] (ref 5–9)
PROT UR STRIP-MCNC: NEGATIVE MG/DL
RBC #/AREA URNS HPF: ABNORMAL /HPF (ref 0–5)
SP GR UR REFRACTOMETRY: >1.035 (ref 1–1.02)
URINE CULTURE IF INDICATED: ABNORMAL
UROBILINOGEN UR QL STRIP.AUTO: 0.2 EU/DL (ref 0.2–1)
WBC URNS QL MICRO: ABNORMAL /HPF (ref 0–4)

## 2022-11-17 LAB
C3 SERPL-MCNC: 141 MG/DL (ref 82–167)
C4 SERPL-MCNC: 24 MG/DL (ref 12–38)
DSDNA AB SER-ACNC: 9 IU/ML (ref 0–9)

## 2022-12-28 ENCOUNTER — TELEPHONE (OUTPATIENT)
Dept: RHEUMATOLOGY | Age: 72
End: 2022-12-28

## 2022-12-28 NOTE — TELEPHONE ENCOUNTER
Pt called stating she doesn't really know what a flare is but on 12/24 and 12/25 she was extremely fatigue, took a 4 hour nap that day, got a good nights rest etc , still feels very tired but some better, she doesn't know if she over done it for [place] or ?, thought she would let you know

## 2022-12-29 NOTE — TELEPHONE ENCOUNTER
Reviewed patient's labs from 11/15/2022. Does not appear she was having a flare of lupus at that time and she did not seem to be anemic either which could explain her fatigue. Unfortunately there is no medication to treat the fatigue but she would need to rest enough if she overdid it during Je. That is the only  recommendation I have to treat her fatigue.

## 2023-01-20 NOTE — TELEPHONE ENCOUNTER
Called pt gabym I was doing a flu call to see how shes doing , has her fatigue improved? Asked her to CB with up date.

## 2023-05-02 ENCOUNTER — TRANSCRIBE ORDERS (OUTPATIENT)
Dept: SCHEDULING | Age: 73
End: 2023-05-02

## 2023-05-02 DIAGNOSIS — Z12.31 SCREENING MAMMOGRAM, ENCOUNTER FOR: Primary | ICD-10-CM

## 2023-05-10 ENCOUNTER — OFFICE VISIT (OUTPATIENT)
Dept: ORTHOPEDIC SURGERY | Age: 73
End: 2023-05-10

## 2023-05-10 DIAGNOSIS — M25.552 HIP PAIN, LEFT: Primary | ICD-10-CM

## 2023-05-10 DIAGNOSIS — M70.61 GREATER TROCHANTERIC BURSITIS OF RIGHT HIP: ICD-10-CM

## 2023-05-10 DIAGNOSIS — M70.62 GREATER TROCHANTERIC BURSITIS OF LEFT HIP: ICD-10-CM

## 2023-05-10 DIAGNOSIS — M16.12 PRIMARY OSTEOARTHRITIS OF LEFT HIP: ICD-10-CM

## 2023-05-10 DIAGNOSIS — M54.50 LOW BACK PAIN AT MULTIPLE SITES: ICD-10-CM

## 2023-05-10 DIAGNOSIS — M16.12 PRIMARY OSTEOARTHRITIS OF LEFT HIP: Primary | ICD-10-CM

## 2023-05-10 NOTE — PROGRESS NOTES
Name: Faye Stephens  YOB: 1950  Gender: female  MRN: [id number]      Chief complaint:  LEFT HIP PAIN    History of Present Illness:     Faye Stephens is a 74 y.o. female  She presents today with new complaint of left hip pain. Notes that this pain began a few months ago and is gradually gotten more severe and noticeable. Notes the pain is located over the left groin and thigh region. This pain was exacerbated while walking 6 days in [place] on a recent trip. She also notes pain over the lateral aspect of the left and the right hips today. She does have history of a right MICHAEL performed in 2020 by Dr. Jung Blanchard. Denies any groin or thigh pain today. She also notes a history of chronic low back pain with previous spine surgery. Currently she is walking without any assistive device but has been using a cane intermittently when her symptoms are more apparent. Past Medical History: Allergies: Allergies   Allergen Reactions    Sulfa Antibiotics Other (See Comments)     Visual dusturbances    Penicillins Rash       Medications:  Current Outpatient Medications   Medication Sig    Coenzyme Q10 (CO Q 10) 10 MG CAPS Take by mouth    hydroxychloroquine (PLAQUENIL) 200 MG tablet Take 1 pill once a day after food. Calcium Carbonate-Vitamin D (CALTRATE 600+D PO) Take 1 tablet by mouth    acetaminophen (TYLENOL) 500 MG tablet Take 2 tablets by mouth every 6 hours as needed    acyclovir (ZOVIRAX) 400 MG tablet Take 1 tablet by mouth as needed    alclomethasone (ACLOVATE) 0.05 % cream Apply topically 2 times daily    amLODIPine (NORVASC) 10 MG tablet Take 1 tablet by mouth daily    amphetamine-dextroamphetamine (ADDERALL) 30 MG tablet Take 1 tablet by mouth daily.     vitamin D3 (CHOLECALCIFEROL) 10 MCG (400 UNIT) TABS tablet Take by mouth daily    diclofenac (VOLTAREN) 75 MG EC tablet Take 1 tablet by mouth 2 times daily    DULoxetine (CYMBALTA) 30 MG extended release capsule Take 30 mg by mouth 2

## 2023-05-22 ENCOUNTER — TELEPHONE (OUTPATIENT)
Dept: ORTHOPEDIC SURGERY | Age: 73
End: 2023-05-22

## 2023-05-22 NOTE — TELEPHONE ENCOUNTER
She saw Suhail Youngpinky on May 10 and was told she needs a hip replacement. She had two injections but they haven't helped. Please call. She is wondering if she can have the surgery any sooner.

## 2023-06-05 ENCOUNTER — TELEPHONE (OUTPATIENT)
Dept: ORTHOPEDIC SURGERY | Age: 73
End: 2023-06-05

## 2023-06-05 NOTE — TELEPHONE ENCOUNTER
Let patient know that I can put her on the cancellation list for surgery and as far as joint camp,that's not possible its not optional for patients.

## 2023-06-05 NOTE — TELEPHONE ENCOUNTER
Pt wants schedule her surgery earlier than August  She is having trouble walking , sleeping and she  Does not want to go to [facility], she has been  before

## 2023-06-06 DIAGNOSIS — M16.12 PRIMARY OSTEOARTHRITIS OF LEFT HIP: Primary | ICD-10-CM

## 2023-06-30 ENCOUNTER — PATIENT MESSAGE (OUTPATIENT)
Dept: PULMONOLOGY | Age: 73
End: 2023-06-30

## 2023-07-11 ENCOUNTER — HOSPITAL ENCOUNTER (OUTPATIENT)
Dept: GENERAL RADIOLOGY | Age: 73
Discharge: HOME | End: 2023-07-14
Payer: MEDICARE

## 2023-07-11 DIAGNOSIS — J84.9 ILD (INTERSTITIAL LUNG DISEASE) (HCC): ICD-10-CM

## 2023-07-11 PROCEDURE — 71046 X-RAY EXAM CHEST 2 VIEWS: CPT

## 2023-07-12 ENCOUNTER — PREP FOR PROCEDURE (OUTPATIENT)
Dept: ORTHOPEDIC SURGERY | Age: 73
End: 2023-07-12

## 2023-07-12 DIAGNOSIS — M16.12 PRIMARY OSTEOARTHRITIS OF LEFT HIP: Primary | ICD-10-CM

## 2023-07-12 RX ORDER — ACETAMINOPHEN 325 MG/1
1000 TABLET ORAL ONCE
Status: CANCELLED | OUTPATIENT
Start: 2023-07-12 | End: 2023-07-12

## 2023-07-14 ENCOUNTER — OFFICE VISIT (OUTPATIENT)
Dept: PULMONOLOGY | Age: 73
End: 2023-07-14

## 2023-07-14 VITALS
WEIGHT: 150 LBS | HEART RATE: 68 BPM | BODY MASS INDEX: 22.73 KG/M2 | RESPIRATION RATE: 20 BRPM | DIASTOLIC BLOOD PRESSURE: 80 MMHG | HEIGHT: 68 IN | TEMPERATURE: 98 F | OXYGEN SATURATION: 96 % | SYSTOLIC BLOOD PRESSURE: 110 MMHG

## 2023-07-14 DIAGNOSIS — M35.9 SYSTEMIC INVOLVEMENT OF CONNECTIVE TISSUE, UNSPECIFIED (HCC): ICD-10-CM

## 2023-07-14 DIAGNOSIS — J84.9 ILD (INTERSTITIAL LUNG DISEASE) (HCC): Primary | ICD-10-CM

## 2023-07-14 LAB
EXPIRATORY TIME: NORMAL
FEF 25-75% %PRED-PRE: NORMAL
FEF 25-75% PRED: NORMAL
FEF 25-75%-PRE: NORMAL
FEV1 %PRED-PRE: 98 %
FEV1 PRED: NORMAL
FEV1/FVC %PRED-PRE: NORMAL
FEV1/FVC PRED: NORMAL
FEV1/FVC: 84 %
FEV1: 2.44 L
FVC %PRED-PRE: 89 %
FVC PRED: NORMAL
FVC: 2.92 L
PEF %PRED-PRE: NORMAL
PEF PRED: NORMAL
PEF-PRE: NORMAL

## 2023-07-14 ASSESSMENT — PULMONARY FUNCTION TESTS
FVC: 2.92
FEV1/FVC: 84
FEV1: 2.44
FVC_PERCENT_PREDICTED_PRE: 89
FEV1_PERCENT_PREDICTED_PRE: 98

## 2023-07-14 NOTE — PROGRESS NOTES
All CT  scans performed at this facility use one or all of the following: Automated  exposure control, adjustment of the mA and/or kVp according to patient's size,  iterative reconstruction. Findings:  CT Chest:  The base of the neck is unremarkable in appearance. No axillary, or mediastinal  lymphadenopathy is seen. Evaluation of the gene is limited due to noncontrast  technique although no obvious bulky hilar changes are seen. The thoracic aorta  is normal in caliber. Evaluation with lung windows demonstrates no acute infiltrates. No worrisome  nodules, or masses are seen. Evaluation of high resolution images is  unremarkable for abnormal reticular interstitial prominence particularly at the  lung bases. Associated groundglass opacities are seen which do not improve with  prone positioning. In addition, mild cylindrical bronchiectasis is seen  extending into these areas in the lower lobes. Given the basilar predominance,  processes such as IPF, or NSIP is favored. No pleural effusion is seen. Lungs  are expanded without evidence for pneumothorax. Limited evaluation of the upper abdomen demonstrates no acute abnormality. A  hepatic calcification is seen which may be related to chronic pneumonitis  infection. The adrenal glands are unremarkable in appearance. Impression  IMPRESSION:  1. No acute or worrisome findings. Only chronic appearing interstitial changes  are seen as described above. 8/3/17      Nuclear Medicine: No results found for this or any previous visit from the past 3650 days. PFTs:   Office Spirometry Results Latest Ref Rng & Units 7/14/2023   FVC L 2.92   FEV1 L 2.44   FEV1 %PRED-PRE % 98   FVC %PRED-PRE % 89   FEV1/FVC % 84     No results found for this or any previous visit. No results found for this or any previous visit. FeNO: No results found for this or any previous visit. Exercise Oximetry: No results found for this or any previous visit.   Echo: No results found for

## 2023-07-18 ENCOUNTER — HOSPITAL ENCOUNTER (OUTPATIENT)
Dept: REHABILITATION | Age: 73
Discharge: HOME | End: 2023-07-21
Payer: MEDICARE

## 2023-07-18 ENCOUNTER — HOSPITAL ENCOUNTER (OUTPATIENT)
Dept: SURGERY | Age: 73
Discharge: HOME | End: 2023-07-21
Payer: MEDICARE

## 2023-07-18 VITALS
BODY MASS INDEX: 22.95 KG/M2 | HEIGHT: 68 IN | TEMPERATURE: 98.4 F | OXYGEN SATURATION: 98 % | DIASTOLIC BLOOD PRESSURE: 73 MMHG | RESPIRATION RATE: 18 BRPM | SYSTOLIC BLOOD PRESSURE: 133 MMHG | HEART RATE: 97 BPM | WEIGHT: 151.46 LBS

## 2023-07-18 DIAGNOSIS — M16.12 PRIMARY OSTEOARTHRITIS OF LEFT HIP: ICD-10-CM

## 2023-07-18 LAB
ANION GAP SERPL CALC-SCNC: 2 MMOL/L (ref 2–11)
APTT PPP: 33.2 SEC (ref 24.5–34.2)
BASOPHILS # BLD: 0.1 K/UL (ref 0–0.2)
BASOPHILS NFR BLD: 1 % (ref 0–2)
BUN SERPL-MCNC: 21 MG/DL (ref 8–23)
CALCIUM SERPL-MCNC: 9.4 MG/DL (ref 8.3–10.4)
CHLORIDE SERPL-SCNC: 113 MMOL/L (ref 101–110)
CO2 SERPL-SCNC: 28 MMOL/L (ref 21–32)
CREAT SERPL-MCNC: 0.8 MG/DL (ref 0.6–1)
DIFFERENTIAL METHOD BLD: ABNORMAL
EOSINOPHIL # BLD: 0.3 K/UL (ref 0–0.8)
EOSINOPHIL NFR BLD: 7 % (ref 0.5–7.8)
ERYTHROCYTE [DISTWIDTH] IN BLOOD BY AUTOMATED COUNT: 13 % (ref 11.9–14.6)
EST. AVERAGE GLUCOSE BLD GHB EST-MCNC: 103 MG/DL
GLUCOSE SERPL-MCNC: 86 MG/DL (ref 65–100)
HBA1C MFR BLD: 5.2 % (ref 4.8–5.6)
HCT VFR BLD AUTO: 38.7 % (ref 35.8–46.3)
HGB BLD-MCNC: 12.3 G/DL (ref 11.7–15.4)
IMM GRANULOCYTES # BLD AUTO: 0 K/UL (ref 0–0.5)
IMM GRANULOCYTES NFR BLD AUTO: 0 % (ref 0–5)
INR PPP: 1
LYMPHOCYTES # BLD: 0.8 K/UL (ref 0.5–4.6)
LYMPHOCYTES NFR BLD: 18 % (ref 13–44)
MCH RBC QN AUTO: 30 PG (ref 26.1–32.9)
MCHC RBC AUTO-ENTMCNC: 31.8 G/DL (ref 31.4–35)
MCV RBC AUTO: 94.4 FL (ref 82–102)
MONOCYTES # BLD: 0.6 K/UL (ref 0.1–1.3)
MONOCYTES NFR BLD: 13 % (ref 4–12)
MRSA DNA SPEC QL NAA+PROBE: NOT DETECTED
NEUTS SEG # BLD: 2.8 K/UL (ref 1.7–8.2)
NEUTS SEG NFR BLD: 61 % (ref 43–78)
NRBC # BLD: 0 K/UL (ref 0–0.2)
PLATELET # BLD AUTO: 194 K/UL (ref 150–450)
PMV BLD AUTO: 11.5 FL (ref 9.4–12.3)
POTASSIUM SERPL-SCNC: 3.8 MMOL/L (ref 3.5–5.1)
PROTHROMBIN TIME: 13 SEC (ref 12.6–14.3)
RBC # BLD AUTO: 4.1 M/UL (ref 4.05–5.2)
S AUREUS CPE NOSE QL NAA+PROBE: DETECTED
SODIUM SERPL-SCNC: 143 MMOL/L (ref 133–143)
WBC # BLD AUTO: 4.6 K/UL (ref 4.3–11.1)

## 2023-07-18 PROCEDURE — 94760 N-INVAS EAR/PLS OXIMETRY 1: CPT

## 2023-07-18 PROCEDURE — 85610 PROTHROMBIN TIME: CPT

## 2023-07-18 PROCEDURE — 85730 THROMBOPLASTIN TIME PARTIAL: CPT

## 2023-07-18 PROCEDURE — 87641 MR-STAPH DNA AMP PROBE: CPT

## 2023-07-18 PROCEDURE — 97161 PT EVAL LOW COMPLEX 20 MIN: CPT

## 2023-07-18 PROCEDURE — 80048 BASIC METABOLIC PNL TOTAL CA: CPT

## 2023-07-18 PROCEDURE — 83036 HEMOGLOBIN GLYCOSYLATED A1C: CPT

## 2023-07-18 PROCEDURE — 85025 COMPLETE CBC W/AUTO DIFF WBC: CPT

## 2023-07-18 RX ORDER — TRIAMCINOLONE ACETONIDE 1 MG/G
CREAM TOPICAL 2 TIMES DAILY PRN
COMMUNITY

## 2023-07-18 RX ORDER — DULOXETIN HYDROCHLORIDE 60 MG/1
60 CAPSULE, DELAYED RELEASE ORAL 2 TIMES DAILY
COMMUNITY

## 2023-07-18 RX ORDER — FAMOTIDINE 20 MG/1
20 TABLET, FILM COATED ORAL
COMMUNITY

## 2023-07-18 ASSESSMENT — PROMIS GLOBAL HEALTH SCALE
IN THE PAST 7 DAYS, HOW WOULD YOU RATE YOUR PAIN ON AVERAGE [ON A SCALE FROM 0 (NO PAIN) TO 10 (WORST IMAGINABLE PAIN)]?: 6
IN GENERAL, WOULD YOU SAY YOUR QUALITY OF LIFE IS...[ON A SCALE OF 1 (POOR) TO 5 (EXCELLENT)]: 4
IN THE PAST 7 DAYS, HOW OFTEN HAVE YOU BEEN BOTHERED BY EMOTIONAL PROBLEMS, SUCH AS FEELING ANXIOUS, DEPRESSED, OR IRRITABLE [ON A SCALE FROM 1 (NEVER) TO 5 (ALWAYS)]?: 5
TO WHAT EXTENT ARE YOU ABLE TO CARRY OUT YOUR EVERYDAY PHYSICAL ACTIVITIES SUCH AS WALKING, CLIMBING STAIRS, CARRYING GROCERIES, OR MOVING A CHAIR [ON A SCALE OF 1 (NOT AT ALL) TO 5 (COMPLETELY)]?: 3
WHO IS THE PERSON COMPLETING THE PROMIS V1.1 SURVEY?: 0
IN GENERAL, HOW WOULD YOU RATE YOUR PHYSICAL HEALTH [ON A SCALE OF 1 (POOR) TO 5 (EXCELLENT)]?: 4
SUM OF RESPONSES TO QUESTIONS 2, 4, 5, & 10: 18
IN GENERAL, HOW WOULD YOU RATE YOUR MENTAL HEALTH, INCLUDING YOUR MOOD AND YOUR ABILITY TO THINK [ON A SCALE OF 1 (POOR) TO 5 (EXCELLENT)]?: 4
HOW IS THE PROMIS V1.1 BEING ADMINISTERED?: 0
IN GENERAL, WOULD YOU SAY YOUR HEALTH IS...[ON A SCALE OF 1 (POOR) TO 5 (EXCELLENT)]: 4
IN GENERAL, HOW WOULD YOU RATE YOUR SATISFACTION WITH YOUR SOCIAL ACTIVITIES AND RELATIONSHIPS [ON A SCALE OF 1 (POOR) TO 5 (EXCELLENT)]?: 5
IN THE PAST 7 DAYS, HOW WOULD YOU RATE YOUR FATIGUE ON AVERAGE [ON A SCALE FROM 1 (NONE) TO 5 (VERY SEVERE)]?: 3
SUM OF RESPONSES TO QUESTIONS 3, 6, 7, & 8: 16
IN GENERAL, PLEASE RATE HOW WELL YOU CARRY OUT YOUR USUAL SOCIAL ACTIVITIES (INCLUDES ACTIVITIES AT HOME, AT WORK, AND IN YOUR COMMUNITY, AND RESPONSIBILITIES AS A PARENT, CHILD, SPOUSE, EMPLOYEE, FRIEND, ETC) [ON A SCALE OF 1 (POOR) TO 5 (EXCELLENT)]?: 4

## 2023-07-18 ASSESSMENT — PULMONARY FUNCTION TESTS
FEV1 (%PREDICTED): 91
FEV1 (LITERS): 2.07

## 2023-07-18 ASSESSMENT — PAIN SCALES - GENERAL
PAINLEVEL_OUTOF10: 0
PAINLEVEL_OUTOF10: 2

## 2023-07-18 ASSESSMENT — PAIN DESCRIPTION - FREQUENCY: FREQUENCY: CONTINUOUS

## 2023-07-18 ASSESSMENT — HOOS JR
HOOS JR RAW SCORE: 8
HOOS JR TOTAL INTERVAL SCORE: 64.664
SITTING: 1
WALKING ON UNEVEN SURFACE: 1
LYING IN BED (TURNING OVER, MAINTAINING HIP POSITION): 1
RISING FROM SITTING: 1
GOING UP OR DOWN STAIRS: 2
HOOS JR RAW SCORE: 8
BENDING TO THE FLOOR TO PICK UP OBJECT: 2

## 2023-07-18 ASSESSMENT — PAIN DESCRIPTION - LOCATION: LOCATION: HIP

## 2023-07-18 ASSESSMENT — PAIN DESCRIPTION - PAIN TYPE: TYPE: CHRONIC PAIN

## 2023-07-18 ASSESSMENT — PAIN DESCRIPTION - DESCRIPTORS: DESCRIPTORS: STABBING;SHARP;ACHING

## 2023-07-18 ASSESSMENT — PAIN - FUNCTIONAL ASSESSMENT: PAIN_FUNCTIONAL_ASSESSMENT: PREVENTS OR INTERFERES SOME ACTIVE ACTIVITIES AND ADLS

## 2023-07-18 ASSESSMENT — PAIN DESCRIPTION - ORIENTATION: ORIENTATION: LEFT

## 2023-07-18 NOTE — PERIOP NOTE
Patient verified name and . Order for consent was found in EHR and matches case posting; patient verified. Left total hip arthroplasty    Type 3 surgery, PAT joint camp assessment complete. Labs per surgeon: CBC,BMP, PT/PTT, HgbA1c ; results Pending  Labs per anesthesia protocol: no additional  EKG:Done 2023 and within anesthesia guidelines. Copy of EKG tracing faxed from Dr. Russel Vilchis office and scanned into EHR for anesthesia reference. Pt is followed by Dr. Rodriguez Erazo (Rheumatology) for systemic lupus erythematosus and raynaud's. Latest office visit 2022 and found in EHR for anesthesia reference. MRSA/MSSA swab collected; pharmacy to review and dose antibiotic as appropriate. Hospital approved surgical skin cleanser and instructions to return bottle on DOS given per hospital policy. Patient provided with handouts including Guide to Surgery, Pain Management, Hand Hygiene, Blood Transfusion Education, and [facility] Brochure. Patient answered medical/surgical history questions at their best of ability. All prior to admission medications documented in [facility]. Original medication prescription bottle was visualized during patient appointment. Patient instructed to hold all vitamins 3 weeks prior to surgery and NSAIDS 5 days prior to surgery. Patient teach back successful and patient demonstrates knowledge of instruction.

## 2023-07-18 NOTE — PERIOP NOTE
Lab results within anesthesia guidelines, no follow-up required. Labs automatically routed to ordering provider via Epic documentation.     Latest Reference Range & Units 07/18/23 08:13   Sodium 133 - 143 mmol/L 143   Potassium 3.5 - 5.1 mmol/L 3.8   Chloride 101 - 110 mmol/L 113 (H)   CO2 21 - 32 mmol/L 28   BUN,BUNPL 8 - 23 MG/DL 21   Creatinine 0.6 - 1.0 MG/DL 0.80   Anion Gap 2 - 11 mmol/L 2   Est, Glom Filt Rate >60 ml/min/1.73m2 >60   Glucose, Random 65 - 100 mg/dL 86   CALCIUM, SERUM, 195546 8.3 - 10.4 MG/DL 9.4   Hemoglobin A1C 4.8 - 5.6 % 5.2   eAG (mg/dL) mg/dL 103   WBC 4.3 - 11.1 K/uL 4.6   RBC 4.05 - 5.2 M/uL 4.10   Hemoglobin Quant 11.7 - 15.4 g/dL 12.3   Hematocrit 35.8 - 46.3 % 38.7   MCV 82.0 - 102.0 FL 94.4   MCH 26.1 - 32.9 PG 30.0   MCHC 31.4 - 35.0 g/dL 31.8   MPV 9.4 - 12.3 FL 11.5   RDW 11.9 - 14.6 % 13.0   Platelet Count 284 - 450 K/uL 194   Neutrophils % 43 - 78 % 61   Lymphocyte % 13 - 44 % 18   Monocytes % 4.0 - 12.0 % 13 (H)   Eosinophils % 0.5 - 7.8 % 7   Basophils % 0.0 - 2.0 % 1   Neutrophils Absolute 1.7 - 8.2 K/UL 2.8   Lymphocytes Absolute 0.5 - 4.6 K/UL 0.8   Monocytes Absolute 0.1 - 1.3 K/UL 0.6   Eosinophils Absolute 0.0 - 0.8 K/UL 0.3   Basophils Absolute 0.0 - 0.2 K/UL 0.1   Differential Type -   AUTOMATED   Immature Granulocytes 0.0 - 5.0 % 0   Nucleated Red Blood Cells 0.0 - 0.2 K/uL 0.00   Absolute Immature Granulocyte 0.0 - 0.5 K/UL 0.0   Prothrombin Time 12.6 - 14.3 sec 13.0   INR -   1.0   PTT 24.5 - 34.2 SEC 33.2   (H): Data is abnormally high

## 2023-07-18 NOTE — PERIOP NOTE
PLEASE CONTINUE TAKING ALL PRESCRIPTION MEDICATIONS UP TO THE DAY OF SURGERY UNLESS OTHERWISE DIRECTED BELOW. DISCONTINUE all vitamins, herbals and supplements 3 weeks prior to surgery. DISCONTINUE Non-Steriodal Anti-Inflammatory (NSAIDS) such as Advil, Ibuprofen, Motrin, Naproxen and Aleve 5 days prior to surgery. Home Medications to take  the day of surgery   Amlodipine  Adderall (if needed)   Duloxetine  Famotidine   Hydroxycholroquine     Home Medications   to Hold   Hold Diclofenac x 5 days prior to surgery        Comments   On the day before surgery please take Acetaminophen 1000mg in the morning and then again before bed. You may substitute for Tylenol 650 mg.      Drink Ensure Pre-Surgery 2 bottles the night before surgery and 1 bottles   2 hours prior to your arrival time. Bring Dynahex wash and Incentive Spirometer with you to hospital on the day of surgery. Please do not bring home medications with you on the day of surgery unless otherwise directed by your nurse. If you are instructed to bring home medications, please give them to your nurse as they will be administered by the nursing staff. If you have any questions, please call [address] [place] [address] [phone]. A copy of this note was provided to the patient for reference.

## 2023-07-18 NOTE — PROGRESS NOTES
Daniela Degree  : 1950  Primary: [facility]  Secondary:  Joint Camp at [facility]  [address] [facility], [facility] [place]  [phone]      Physical Therapy Prehab Evaluation Summary:2023   Time In/Out   PT Charge Capture  Episode     MEDICAL/REFERRING DIAGNOSIS: Unilateral primary osteoarthritis, left hip [M16.12]  REFERRING PHYSICIAN: Estrada Fox MD    ICD-10: Treatment Diagnosis:   Pain in left hip (M25.552)  Stiffness of Left Hip, Not elsewhere classified (M25.652)  Difficulty in walking, Not elsewhere classified (R26.2)    DATE OF SURGERY: 8/10/23  Assessment:   COMMENTS:  Patient presents prior to L MICHAEL. Patient had a R MICHAEL in  and did very well. She is independent at base and plans to d/c home same day. She will have assist from her spouse. PROBLEM LIST:   (Impacting functional limitations):  Ms. Linda Lyons presents with the following lower extremity(s) problems:  Home Exercise Program  Pain INTERVENTIONS PLANNED:   (Benefits and precautions of physical therapy have been discussed with the patient.)  Home Exercise Program  Educational Discussion       GOALS: (Goals have been discussed and agreed upon with patient.)  Discharge Goals: Time Frame: 1 Day  Patient will demonstrate independence with a home exercise program designed to increase strength, range of motion, balance, coordination, functional technique, and pain control to minimize functional deficits and optimize patient for total joint replacement. Subjective:   Past Medical History/Comorbidities:   Ms. Linda Lyons  has a past medical history of ADD (attention deficit disorder), Arthritis, Depression, Essential hypertension, Interstitial lung disease due to connective tissue disease ([address]), Menopause, Mitral valve prolapse, Pneumonia, Sciatic nerve disease, right, SLE (systemic lupus erythematosus) ([address]), and Unspecified sleep apnea.   Ms. Linda Lyons  has a past surgical history that includes

## 2023-07-18 NOTE — PROGRESS NOTES
07/18/23 0807   Treatment   Treatment Type Bedside spirometry   Oxygen Therapy/Pulse Ox   O2 Therapy Room air   Pulse 97   SpO2 98 %   Pulse Oximeter Device Mode Intermittent   $Pulse Oximeter $Spot check (single)   Bedside Spirometry   FEV-1/Actual (Liters) 2.07 Liters   FEV-1/Predicted (Liters) 91 Liters     Initial respiratory Assessment completed with pt. Pt was interviewed and evaluated in Joint camp prior to surgery. Patient ID:  Maynor Dickey  [id number]  25 y.o.  1950  Surgeon: Dr. Julienne Baig  Date of Surgery: [email or web address]  Procedure: Total Left Hip Arthroplasty  Primary Care Physician: Cris Kauffman -[phone]  Specialists: [facility]  7/14/2023    BS:CLEAR      Pt taught proper COUGH technique  IS REVIEWED WITH PT AS WELL AS BENEFITS OF USING IS IN SEDENTARY PTS. DIAPHRAGMATIC BREATHING EXERCISE INSTRUCTIONS GIVEN    History of smoking:   DENIES                 Quit date:         Secondhand smoke:FATHER    Past procedures with Oxygen desaturation or delayed awakening:DELAYED AWAKENING AFTER ANESTHESIA    Past Medical History:   Diagnosis Date    ADD (attention deficit disorder)     Rx daily    Arthritis     osteoarthritis in knees and hands    Depression     Essential hypertension 01/05/2017    controlled w/med    Interstitial lung disease due to connective tissue disease ([address])     followed by [facility]. most recent OV 3/2019-ILD r/t SLE. seen annually w/CXR and Spirometry. Menopause     Mitral valve prolapse     Pneumonia     Sciatic nerve disease, right     SLE (systemic lupus erythematosus) ([address]) 09/18/2017    followed by [facility]-most recent [facility] 12/2019.  SLE stable w/med    Unspecified sleep apnea     sleeps with c-pap    KINGA- CPAP  HX OF ILD- STABLE  HX OF PNA    Respiratory history:DENIES SOB                                                                 Respiratory meds:  DENIES    FAMILY PRESENT:             NO     PAST SLEEP

## 2023-07-18 NOTE — PROGRESS NOTES
Total Joint Surgery Preoperative Chart Review      Patient ID:  Tayo Greene  [id number]  53 y.o.  1950  Surgeon: Dr. Angeles Navarro  Date of Surgery: 8/10/2023  Procedure: Total Left Hip Arthroplasty  Primary Care Physician: Alison Randall [phone]  Specialty Physician(s):      Subjective:   Tayo Greene is a 74 y.o. White (non-) female who presents for preoperative evaluation for Total Left Hip arthroplasty. This is a preoperative chart review note based on data collected by the nurse at the surgical Pre-Assessment visit. Past Medical History:   Diagnosis Date    ADD (attention deficit disorder)     Rx daily    Arthritis     osteoarthritis in knees and hands    Depression     Essential hypertension 01/05/2017    controlled w/med    Interstitial lung disease due to connective tissue disease ([address])     followed by [facility]. most recent OV 3/2019-ILD r/t SLE. seen annually w/CXR and Spirometry. Menopause     Mitral valve prolapse     Pneumonia     Sciatic nerve disease, right     SLE (systemic lupus erythematosus) ([address]) 09/18/2017    followed by [facility]-most recent [facility] 12/2019. SLE stable w/med    Unspecified sleep apnea     sleeps with c-pap      Past Surgical History:   Procedure Laterality Date    COLONOSCOPY  08/20/2013    with polypectomy    COLONOSCOPY N/A 6/15/2022    COLORECTAL CANCER SCREENING, NOT HIGH RISK/23 performed by Galina Carrera MD at [address] [place] [address] 02/2020     Family History   Problem Relation Age of Onset    Post-op Nausea/Vomiting Neg Hx     Emergence Delirium Neg Hx     Pseudochol.  Deficiency Neg Hx     Post-op Cognitive Dysfunction Neg Hx     Other Neg Hx     Breast Cancer Neg Hx     Heart Disease Sister     Stroke Mother     Cancer Father         Lung    Heart Disease Father         CAD/MI    Cancer Brother

## 2023-08-03 ENCOUNTER — OFFICE VISIT (OUTPATIENT)
Dept: RHEUMATOLOGY | Age: 73
End: 2023-08-03
Payer: MEDICARE

## 2023-08-03 VITALS
HEART RATE: 94 BPM | WEIGHT: 150 LBS | BODY MASS INDEX: 22.73 KG/M2 | DIASTOLIC BLOOD PRESSURE: 68 MMHG | HEIGHT: 68 IN | SYSTOLIC BLOOD PRESSURE: 117 MMHG

## 2023-08-03 DIAGNOSIS — I73.00 RAYNAUD'S DISEASE WITHOUT GANGRENE: ICD-10-CM

## 2023-08-03 DIAGNOSIS — Z79.899 LONG-TERM USE OF HIGH-RISK MEDICATION: ICD-10-CM

## 2023-08-03 DIAGNOSIS — M32.9 SYSTEMIC LUPUS ERYTHEMATOSUS, UNSPECIFIED SLE TYPE, UNSPECIFIED ORGAN INVOLVEMENT STATUS (HCC): Primary | ICD-10-CM

## 2023-08-03 PROCEDURE — 3074F SYST BP LT 130 MM HG: CPT | Performed by: INTERNAL MEDICINE

## 2023-08-03 PROCEDURE — 1123F ACP DISCUSS/DSCN MKR DOCD: CPT | Performed by: INTERNAL MEDICINE

## 2023-08-03 PROCEDURE — 99214 OFFICE O/P EST MOD 30 MIN: CPT | Performed by: INTERNAL MEDICINE

## 2023-08-03 PROCEDURE — 3078F DIAST BP <80 MM HG: CPT | Performed by: INTERNAL MEDICINE

## 2023-08-03 RX ORDER — HYDROXYCHLOROQUINE SULFATE 200 MG/1
TABLET, FILM COATED ORAL
Qty: 90 TABLET | Refills: 1 | Status: SHIPPED | OUTPATIENT
Start: 2023-08-03

## 2023-08-03 ASSESSMENT — ROUTINE ASSESSMENT OF PATIENT INDEX DATA (RAPID3)
ON A SCALE OF ONE TO TEN, HOW MUCH PAIN HAVE YOU HAD BECAUSE OF YOUR CONDITION OVER THE PAST WEEK?: 5
ON A SCALE OF ONE TO TEN, CONSIDERING ALL THE WAYS IN WHICH ILLNESS AND HEALTH CONDITIONS MAY AFFECT YOU AT THIS TIME, PLEASE INDICATE BELOW HOW YOU ARE DOING:: 6
ON A SCALE OF ONE TO TEN, HOW DIFFICULT WAS IT FOR YOU TO COMPLETE THE LISTED DAILY PHYSICAL TASKS OVER THE LAST WEEK: 0.4
WHEN YOU AWAKENED IN THE MORNING OVER THE LAST WEEK, PLEASE INDICATE THE AMOUNT OF TIME IT TAKES UNTIL YOU ARE AS LIMBER AS YOU WILL BE FOR THE DAY: < 10 MIN
ON A SCALE OF ONE TO TEN, HOW MUCH OF A PROBLEM HAS UNUSUAL FATIGUE OR TIREDNESS BEEN FOR YOU OVER THE PAST WEEK?: 6

## 2023-08-03 ASSESSMENT — JOINT PAIN
TOTAL NUMBER OF SWOLLEN JOINTS: 0
TOTAL NUMBER OF TENDER JOINTS: 1

## 2023-08-03 NOTE — PROGRESS NOTES
Denice Wall M.D.  [address] [place] [address]., [address] [place] [address], [address] [place] [address]  Office : [phone], Fax: [phone] OFFICE VISIT NOTE  Date of Visit:  8/3/2023 1:20 PM    Patient Information:  Name:  Ebonie Marquis  :  1950  Age:  74 y.o. Gender:  female      Ms. Mike Nova is here today for follow-up of systemic lupus erythematosus, raynaud's and medication monitoring. Last visit: 23    History of Present Illness: On talking to the patient today she states that she has been having left hip pain and will have a total left hip replacement in 1 weeks time. Her other current joint complaints are as mentioned below. Since the last visit, patient is feeling \"fair\". Pain: 5/10  Location:  Occasional buckling of the left knee with no swelling, warmth and redness. Some lower back pain. Bilateral thumb pain with swelling with no warmth and redness. Quality:  Sharp pain. Modifying Factors:  Using her hands worsens the thumb pain. Associated Symptoms:  Some left thigh pain. No weakness of her legs. No tingling or numbness or pain down the arms or legs. No limitations with her ADL's. Fatigue: 6 /10    DMARD/Biologic 8/3/2023   AM Stiffness < 10 min   Pain 5   Fatigue 6   MDHAQ 0.4   Patient Global Score 6   Medication Name Plaquenil   Dose -   Medication Name -   Other Medication -     Last TB screen:    TB result: Skin (+); X-Ray (-)       Current dose of steroids: None  How long on current dose of steroids: NA  How long on continuous steroid therapy: NA       Past DMARDs, if applicable (methotrexate, plaquenil/hydroxychloroquine, sulfasalazine, Arava/leflunomide): Currently on Plaquenil 200 mg QD.        Past biologics, if applicable  (enbrel, humira, simponi, cimzia, [place], [place], remicade, simponi aria, actemra, rituximab, otezla, stelara, cosentyx): None       Past NSAIDs, if applicable (motrin, aleve, naproxen, advil,

## 2023-08-07 ENCOUNTER — HOSPITAL ENCOUNTER (OUTPATIENT)
Dept: MAMMOGRAPHY | Age: 73
Discharge: HOME | End: 2023-08-10
Payer: MEDICARE

## 2023-08-07 DIAGNOSIS — Z12.31 SCREENING MAMMOGRAM, ENCOUNTER FOR: ICD-10-CM

## 2023-08-07 PROCEDURE — 77067 SCR MAMMO BI INCL CAD: CPT

## 2023-08-07 NOTE — H&P
rhythm. No murmur or gallops. No carotid bruits. Peripheral pulses: radial 2 +, PT 2+, DP 2+ bilaterally. LUNGS: CTA bilaterally. No wheezes, rhonchi or rales. GI: positive BS. Abdomen nontender. NEUROLOGIC: Alert and oriented x 3. Bilateral equal strong had grasp and bilateral equal strong plantar flexion and dorsiflexion. GAIT: abnormal MUSCULOSKELETAL: ROM: full with pain. Tenderness: . Crepitus: not present. SKIN: No rash, bruising, swelling, redness or warmth. Labs:  No results found for this or any previous visit (from the past 24 hour(s)). Xray: Left hip: joint space narrowing with advanced degenerative changes noted. Assessment:  Advanced Left Hip Osteoarthritis. Total Left Hip Arthroplasty Indicated. Patient Active Problem List   Diagnosis    Arthritis    Pain of right lower leg    Other forms of systemic lupus erythematosus (HCC)    Menopause syndrome    Mitral valve prolapse    Interstitial lung disease due to connective tissue disease (HCC)    Sleep apnea    Palpitations    ADD (attention deficit disorder)    Essential hypertension    S/P total hip arthroplasty    Dyspepsia    Chronic right-sided low back pain with right-sided sciatica    Hypercholesterolemia    Cough    Arthritis of right hip    Raynaud's phenomenon without gangrene    Primary osteoarthritis of left hip       Plan:  I have advised the patient of the risks and consequences, including possible complications of performing total joint replacement, as well as not doing this operation. The patient had the opportunity to ask questions and have them answered to their satisfaction.      Signed:  SUDHA Brown 8/7/2023

## 2023-08-09 ENCOUNTER — ANESTHESIA EVENT (OUTPATIENT)
Dept: SURGERY | Age: 73
End: 2023-08-09
Payer: MEDICARE

## 2023-08-09 ENCOUNTER — OFFICE VISIT (OUTPATIENT)
Dept: ORTHOPEDIC SURGERY | Age: 73
End: 2023-08-09

## 2023-08-09 DIAGNOSIS — M16.12 PRIMARY OSTEOARTHRITIS OF LEFT HIP: Primary | ICD-10-CM

## 2023-08-09 RX ORDER — ASPIRIN 81 MG/1
81 TABLET ORAL 2 TIMES DAILY
Qty: 30 TABLET | Refills: 0 | Status: SHIPPED | OUTPATIENT
Start: 2023-08-09

## 2023-08-09 RX ORDER — ONDANSETRON 4 MG/1
4 TABLET, FILM COATED ORAL EVERY 6 HOURS PRN
Qty: 30 TABLET | Refills: 0 | Status: SHIPPED | OUTPATIENT
Start: 2023-08-09

## 2023-08-09 RX ORDER — IPRATROPIUM BROMIDE AND ALBUTEROL SULFATE 2.5; .5 MG/3ML; MG/3ML
1 SOLUTION RESPIRATORY (INHALATION)
Status: CANCELLED | OUTPATIENT
Start: 2023-08-09 | End: 2023-08-10

## 2023-08-09 RX ORDER — METHOCARBAMOL 750 MG/1
TABLET, FILM COATED ORAL
Qty: 40 TABLET | Refills: 0 | Status: SHIPPED | OUTPATIENT
Start: 2023-08-09

## 2023-08-09 RX ORDER — OXYCODONE HYDROCHLORIDE 5 MG/1
5-10 TABLET ORAL EVERY 4 HOURS PRN
Qty: 60 TABLET | Refills: 0 | Status: SHIPPED | OUTPATIENT
Start: 2023-08-09 | End: 2023-08-16

## 2023-08-09 NOTE — PROGRESS NOTES
Name: Davin Martins  YOB: 1950  Gender: female  MRN: [id number]    Pre-Op     CC: LEFT HIP PAIN       This patient comes in for pre-op exam prior to LEFT MICHAEL. The patient has been cleared preoperatively. I counseled the patient once again about the risks of infection, DVT formation, expected time of hospitalization, anticipated recovery time as well as rehab needs and expectations for recovery. The patient would like to proceed and we will do so as planned. The patient was provided with pain medications as well as DVT prophylaxis to have on hand postoperatively at the time of discharge from hospital. All pertinent questions asked by the patient were answered.     Lonzo Cogan, PA

## 2023-08-10 ENCOUNTER — ANESTHESIA (OUTPATIENT)
Dept: SURGERY | Age: 73
End: 2023-08-10
Payer: MEDICARE

## 2023-08-10 ENCOUNTER — APPOINTMENT (OUTPATIENT)
Dept: GENERAL RADIOLOGY | Age: 73
End: 2023-08-10
Attending: ORTHOPAEDIC SURGERY
Payer: MEDICARE

## 2023-08-10 ENCOUNTER — HOME HEALTH ADMISSION (OUTPATIENT)
Dept: HOME HEALTH SERVICES | Facility: HOME HEALTH | Age: 73
End: 2023-08-10
Payer: MEDICARE

## 2023-08-10 ENCOUNTER — HOSPITAL ENCOUNTER (OUTPATIENT)
Age: 73
Discharge: HOME | End: 2023-08-10
Attending: ORTHOPAEDIC SURGERY | Admitting: ORTHOPAEDIC SURGERY
Payer: MEDICARE

## 2023-08-10 VITALS
OXYGEN SATURATION: 92 % | SYSTOLIC BLOOD PRESSURE: 106 MMHG | HEART RATE: 68 BPM | WEIGHT: 149.5 LBS | HEIGHT: 68 IN | DIASTOLIC BLOOD PRESSURE: 61 MMHG | RESPIRATION RATE: 18 BRPM | BODY MASS INDEX: 22.66 KG/M2 | TEMPERATURE: 97.2 F

## 2023-08-10 DIAGNOSIS — M16.12 PRIMARY OSTEOARTHRITIS OF LEFT HIP: ICD-10-CM

## 2023-08-10 PROCEDURE — 6370000000 HC RX 637 (ALT 250 FOR IP): Performed by: ANESTHESIOLOGY

## 2023-08-10 PROCEDURE — 3700000000 HC ANESTHESIA ATTENDED CARE: Performed by: ORTHOPAEDIC SURGERY

## 2023-08-10 PROCEDURE — 72170 X-RAY EXAM OF PELVIS: CPT

## 2023-08-10 PROCEDURE — 2500000003 HC RX 250 WO HCPCS: Performed by: ORTHOPAEDIC SURGERY

## 2023-08-10 PROCEDURE — 6360000002 HC RX W HCPCS: Performed by: ANESTHESIOLOGY

## 2023-08-10 PROCEDURE — 2720000010 HC SURG SUPPLY STERILE: Performed by: ORTHOPAEDIC SURGERY

## 2023-08-10 PROCEDURE — 2580000003 HC RX 258: Performed by: ORTHOPAEDIC SURGERY

## 2023-08-10 PROCEDURE — 3600000005 HC SURGERY LEVEL 5 BASE: Performed by: ORTHOPAEDIC SURGERY

## 2023-08-10 PROCEDURE — 3700000001 HC ADD 15 MINUTES (ANESTHESIA): Performed by: ORTHOPAEDIC SURGERY

## 2023-08-10 PROCEDURE — 6360000002 HC RX W HCPCS: Performed by: NURSE ANESTHETIST, CERTIFIED REGISTERED

## 2023-08-10 PROCEDURE — 97161 PT EVAL LOW COMPLEX 20 MIN: CPT

## 2023-08-10 PROCEDURE — 7100000000 HC PACU RECOVERY - FIRST 15 MIN: Performed by: ORTHOPAEDIC SURGERY

## 2023-08-10 PROCEDURE — 3600000015 HC SURGERY LEVEL 5 ADDTL 15MIN: Performed by: ORTHOPAEDIC SURGERY

## 2023-08-10 PROCEDURE — 2709999900 HC NON-CHARGEABLE SUPPLY: Performed by: ORTHOPAEDIC SURGERY

## 2023-08-10 PROCEDURE — 6360000002 HC RX W HCPCS: Performed by: PHYSICIAN ASSISTANT

## 2023-08-10 PROCEDURE — 2580000003 HC RX 258: Performed by: NURSE ANESTHETIST, CERTIFIED REGISTERED

## 2023-08-10 PROCEDURE — 2500000003 HC RX 250 WO HCPCS: Performed by: ANESTHESIOLOGY

## 2023-08-10 PROCEDURE — 6360000002 HC RX W HCPCS: Performed by: ORTHOPAEDIC SURGERY

## 2023-08-10 PROCEDURE — C1776 JOINT DEVICE (IMPLANTABLE): HCPCS | Performed by: ORTHOPAEDIC SURGERY

## 2023-08-10 PROCEDURE — 97535 SELF CARE MNGMENT TRAINING: CPT

## 2023-08-10 PROCEDURE — 2580000003 HC RX 258: Performed by: ANESTHESIOLOGY

## 2023-08-10 PROCEDURE — 7100000001 HC PACU RECOVERY - ADDTL 15 MIN: Performed by: ORTHOPAEDIC SURGERY

## 2023-08-10 PROCEDURE — 97530 THERAPEUTIC ACTIVITIES: CPT

## 2023-08-10 PROCEDURE — 97165 OT EVAL LOW COMPLEX 30 MIN: CPT

## 2023-08-10 PROCEDURE — 6370000000 HC RX 637 (ALT 250 FOR IP): Performed by: PHYSICIAN ASSISTANT

## 2023-08-10 PROCEDURE — 2500000003 HC RX 250 WO HCPCS: Performed by: NURSE ANESTHETIST, CERTIFIED REGISTERED

## 2023-08-10 DEVICE — LINER ACET OD54MM ID36MM HIP ALTRX PINN: Type: IMPLANTABLE DEVICE | Site: HIP | Status: FUNCTIONAL

## 2023-08-10 DEVICE — SHELL ACET DIA54MM HIP TI GRIPTION PRI SLD LOK RNG WIHOUT H: Type: IMPLANTABLE DEVICE | Site: HIP | Status: FUNCTIONAL

## 2023-08-10 DEVICE — ELIMINATOR H APEX FOR 48-60MM PINN HIP SHELL: Type: IMPLANTABLE DEVICE | Site: HIP | Status: FUNCTIONAL

## 2023-08-10 DEVICE — STEM FEM SZ 5 HIP STD OFFSET CLLRD CEMENTLESS 12/14 TAPR: Type: IMPLANTABLE DEVICE | Site: HIP | Status: FUNCTIONAL

## 2023-08-10 DEVICE — BIOLOX DELTA CERAMIC FEMORAL HEAD +8.5 36MM DIA 12/14 TAPER
Type: IMPLANTABLE DEVICE | Site: HIP | Status: FUNCTIONAL
Brand: BIOLOX DELTA

## 2023-08-10 DEVICE — HIP H2 TOT ADV OTHER HD IMPL CAPPED SYNTHES: Type: IMPLANTABLE DEVICE | Site: HIP | Status: FUNCTIONAL

## 2023-08-10 RX ORDER — DEXAMETHASONE SODIUM PHOSPHATE 10 MG/ML
10 INJECTION INTRAMUSCULAR; INTRAVENOUS EVERY 6 HOURS
Status: DISCONTINUED | OUTPATIENT
Start: 2023-08-11 | End: 2023-08-10 | Stop reason: HOSPADM

## 2023-08-10 RX ORDER — ONDANSETRON 2 MG/ML
4 INJECTION INTRAMUSCULAR; INTRAVENOUS EVERY 6 HOURS PRN
Status: DISCONTINUED | OUTPATIENT
Start: 2023-08-10 | End: 2023-08-10 | Stop reason: HOSPADM

## 2023-08-10 RX ORDER — DULOXETIN HYDROCHLORIDE 60 MG/1
60 CAPSULE, DELAYED RELEASE ORAL 2 TIMES DAILY
Status: DISCONTINUED | OUTPATIENT
Start: 2023-08-10 | End: 2023-08-10 | Stop reason: HOSPADM

## 2023-08-10 RX ORDER — FENTANYL CITRATE 50 UG/ML
50 INJECTION, SOLUTION INTRAMUSCULAR; INTRAVENOUS EVERY 5 MIN PRN
Status: DISCONTINUED | OUTPATIENT
Start: 2023-08-10 | End: 2023-08-10 | Stop reason: HOSPADM

## 2023-08-10 RX ORDER — NALOXONE HYDROCHLORIDE 0.4 MG/ML
0.4 INJECTION, SOLUTION INTRAMUSCULAR; INTRAVENOUS; SUBCUTANEOUS PRN
Status: DISCONTINUED | OUTPATIENT
Start: 2023-08-10 | End: 2023-08-10 | Stop reason: HOSPADM

## 2023-08-10 RX ORDER — PHENYLEPHRINE HYDROCHLORIDE 10 MG/ML
INJECTION INTRAVENOUS PRN
Status: DISCONTINUED | OUTPATIENT
Start: 2023-08-10 | End: 2023-08-10 | Stop reason: SDUPTHER

## 2023-08-10 RX ORDER — SODIUM CHLORIDE 0.9 % (FLUSH) 0.9 %
5-40 SYRINGE (ML) INJECTION PRN
Status: DISCONTINUED | OUTPATIENT
Start: 2023-08-10 | End: 2023-08-10 | Stop reason: HOSPADM

## 2023-08-10 RX ORDER — DEXAMETHASONE SODIUM PHOSPHATE 10 MG/ML
INJECTION INTRAMUSCULAR; INTRAVENOUS PRN
Status: DISCONTINUED | OUTPATIENT
Start: 2023-08-10 | End: 2023-08-10 | Stop reason: SDUPTHER

## 2023-08-10 RX ORDER — EPHEDRINE SULFATE/0.9% NACL/PF 50 MG/5 ML
SYRINGE (ML) INTRAVENOUS PRN
Status: DISCONTINUED | OUTPATIENT
Start: 2023-08-10 | End: 2023-08-10 | Stop reason: SDUPTHER

## 2023-08-10 RX ORDER — GLYCOPYRROLATE 0.2 MG/ML
INJECTION INTRAMUSCULAR; INTRAVENOUS PRN
Status: DISCONTINUED | OUTPATIENT
Start: 2023-08-10 | End: 2023-08-10 | Stop reason: SDUPTHER

## 2023-08-10 RX ORDER — LIDOCAINE HYDROCHLORIDE 10 MG/ML
1 INJECTION, SOLUTION INFILTRATION; PERINEURAL
Status: COMPLETED | OUTPATIENT
Start: 2023-08-10 | End: 2023-08-10

## 2023-08-10 RX ORDER — FENTANYL CITRATE 50 UG/ML
INJECTION, SOLUTION INTRAMUSCULAR; INTRAVENOUS PRN
Status: DISCONTINUED | OUTPATIENT
Start: 2023-08-10 | End: 2023-08-10 | Stop reason: SDUPTHER

## 2023-08-10 RX ORDER — SODIUM CHLORIDE 0.9 % (FLUSH) 0.9 %
5-40 SYRINGE (ML) INJECTION EVERY 12 HOURS SCHEDULED
Status: DISCONTINUED | OUTPATIENT
Start: 2023-08-10 | End: 2023-08-10 | Stop reason: HOSPADM

## 2023-08-10 RX ORDER — ONDANSETRON 2 MG/ML
INJECTION INTRAMUSCULAR; INTRAVENOUS PRN
Status: DISCONTINUED | OUTPATIENT
Start: 2023-08-10 | End: 2023-08-10 | Stop reason: SDUPTHER

## 2023-08-10 RX ORDER — LIDOCAINE HYDROCHLORIDE 20 MG/ML
INJECTION, SOLUTION EPIDURAL; INFILTRATION; INTRACAUDAL; PERINEURAL PRN
Status: DISCONTINUED | OUTPATIENT
Start: 2023-08-10 | End: 2023-08-10 | Stop reason: SDUPTHER

## 2023-08-10 RX ORDER — ROPIVACAINE HYDROCHLORIDE 2 MG/ML
INJECTION, SOLUTION EPIDURAL; INFILTRATION; PERINEURAL PRN
Status: DISCONTINUED | OUTPATIENT
Start: 2023-08-10 | End: 2023-08-10 | Stop reason: ALTCHOICE

## 2023-08-10 RX ORDER — DIPHENHYDRAMINE HCL 25 MG
25 CAPSULE ORAL EVERY 6 HOURS PRN
Status: DISCONTINUED | OUTPATIENT
Start: 2023-08-10 | End: 2023-08-10 | Stop reason: HOSPADM

## 2023-08-10 RX ORDER — DIPHENHYDRAMINE HYDROCHLORIDE 50 MG/ML
25 INJECTION INTRAMUSCULAR; INTRAVENOUS EVERY 6 HOURS PRN
Status: DISCONTINUED | OUTPATIENT
Start: 2023-08-10 | End: 2023-08-10 | Stop reason: HOSPADM

## 2023-08-10 RX ORDER — MIDAZOLAM HYDROCHLORIDE 1 MG/ML
INJECTION INTRAMUSCULAR; INTRAVENOUS PRN
Status: DISCONTINUED | OUTPATIENT
Start: 2023-08-10 | End: 2023-08-10 | Stop reason: SDUPTHER

## 2023-08-10 RX ORDER — HYDROMORPHONE HYDROCHLORIDE 1 MG/ML
0.5 INJECTION, SOLUTION INTRAMUSCULAR; INTRAVENOUS; SUBCUTANEOUS
Status: DISCONTINUED | OUTPATIENT
Start: 2023-08-10 | End: 2023-08-10 | Stop reason: HOSPADM

## 2023-08-10 RX ORDER — FAMOTIDINE 20 MG/1
20 TABLET, FILM COATED ORAL
Status: DISCONTINUED | OUTPATIENT
Start: 2023-08-10 | End: 2023-08-10 | Stop reason: HOSPADM

## 2023-08-10 RX ORDER — SENNA AND DOCUSATE SODIUM 50; 8.6 MG/1; MG/1
1 TABLET, FILM COATED ORAL 2 TIMES DAILY
Status: DISCONTINUED | OUTPATIENT
Start: 2023-08-10 | End: 2023-08-10 | Stop reason: HOSPADM

## 2023-08-10 RX ORDER — METHOCARBAMOL 750 MG/1
750 TABLET, FILM COATED ORAL 4 TIMES DAILY PRN
Status: DISCONTINUED | OUTPATIENT
Start: 2023-08-10 | End: 2023-08-10 | Stop reason: HOSPADM

## 2023-08-10 RX ORDER — LOSARTAN POTASSIUM 50 MG/1
50 TABLET ORAL DAILY
Status: DISCONTINUED | OUTPATIENT
Start: 2023-08-11 | End: 2023-08-10 | Stop reason: HOSPADM

## 2023-08-10 RX ORDER — ONDANSETRON 2 MG/ML
4 INJECTION INTRAMUSCULAR; INTRAVENOUS
Status: DISCONTINUED | OUTPATIENT
Start: 2023-08-10 | End: 2023-08-10 | Stop reason: HOSPADM

## 2023-08-10 RX ORDER — ACETAMINOPHEN 325 MG/1
650 TABLET ORAL EVERY 6 HOURS
Status: DISCONTINUED | OUTPATIENT
Start: 2023-08-10 | End: 2023-08-10 | Stop reason: HOSPADM

## 2023-08-10 RX ORDER — ASPIRIN 81 MG/1
81 TABLET ORAL 2 TIMES DAILY
Status: DISCONTINUED | OUTPATIENT
Start: 2023-08-10 | End: 2023-08-10 | Stop reason: HOSPADM

## 2023-08-10 RX ORDER — HYDROMORPHONE HYDROCHLORIDE 1 MG/ML
1 INJECTION, SOLUTION INTRAMUSCULAR; INTRAVENOUS; SUBCUTANEOUS
Status: DISCONTINUED | OUTPATIENT
Start: 2023-08-10 | End: 2023-08-10 | Stop reason: HOSPADM

## 2023-08-10 RX ORDER — PROMETHAZINE HYDROCHLORIDE 25 MG/1
25 TABLET ORAL EVERY 6 HOURS PRN
Status: DISCONTINUED | OUTPATIENT
Start: 2023-08-10 | End: 2023-08-10 | Stop reason: HOSPADM

## 2023-08-10 RX ORDER — IPRATROPIUM BROMIDE AND ALBUTEROL SULFATE 2.5; .5 MG/3ML; MG/3ML
1 SOLUTION RESPIRATORY (INHALATION)
Status: DISCONTINUED | OUTPATIENT
Start: 2023-08-10 | End: 2023-08-10 | Stop reason: HOSPADM

## 2023-08-10 RX ORDER — DEXTROAMPHETAMINE SACCHARATE, AMPHETAMINE ASPARTATE, DEXTROAMPHETAMINE SULFATE AND AMPHETAMINE SULFATE 2.5; 2.5; 2.5; 2.5 MG/1; MG/1; MG/1; MG/1
30 TABLET ORAL DAILY
Status: DISCONTINUED | OUTPATIENT
Start: 2023-08-11 | End: 2023-08-10 | Stop reason: HOSPADM

## 2023-08-10 RX ORDER — ACETAMINOPHEN 500 MG
1000 TABLET ORAL ONCE
Status: DISCONTINUED | OUTPATIENT
Start: 2023-08-10 | End: 2023-08-10 | Stop reason: SDUPTHER

## 2023-08-10 RX ORDER — ACETAMINOPHEN 500 MG
1000 TABLET ORAL ONCE
Status: COMPLETED | OUTPATIENT
Start: 2023-08-10 | End: 2023-08-10

## 2023-08-10 RX ORDER — ROSUVASTATIN CALCIUM 5 MG/1
10 TABLET, COATED ORAL NIGHTLY
Status: DISCONTINUED | OUTPATIENT
Start: 2023-08-10 | End: 2023-08-10 | Stop reason: HOSPADM

## 2023-08-10 RX ORDER — SODIUM CHLORIDE, SODIUM LACTATE, POTASSIUM CHLORIDE, CALCIUM CHLORIDE 600; 310; 30; 20 MG/100ML; MG/100ML; MG/100ML; MG/100ML
INJECTION, SOLUTION INTRAVENOUS CONTINUOUS
Status: DISCONTINUED | OUTPATIENT
Start: 2023-08-10 | End: 2023-08-10 | Stop reason: HOSPADM

## 2023-08-10 RX ORDER — TRANEXAMIC ACID 100 MG/ML
INJECTION, SOLUTION INTRAVENOUS PRN
Status: DISCONTINUED | OUTPATIENT
Start: 2023-08-10 | End: 2023-08-10 | Stop reason: SDUPTHER

## 2023-08-10 RX ORDER — SODIUM CHLORIDE 9 MG/ML
INJECTION, SOLUTION INTRAVENOUS PRN
Status: DISCONTINUED | OUTPATIENT
Start: 2023-08-10 | End: 2023-08-10 | Stop reason: HOSPADM

## 2023-08-10 RX ORDER — MAGNESIUM HYDROXIDE/ALUMINUM HYDROXICE/SIMETHICONE 120; 1200; 1200 MG/30ML; MG/30ML; MG/30ML
15 SUSPENSION ORAL EVERY 6 HOURS PRN
Status: DISCONTINUED | OUTPATIENT
Start: 2023-08-10 | End: 2023-08-10 | Stop reason: HOSPADM

## 2023-08-10 RX ORDER — OXYCODONE HYDROCHLORIDE 5 MG/1
5 TABLET ORAL
Status: COMPLETED | OUTPATIENT
Start: 2023-08-10 | End: 2023-08-10

## 2023-08-10 RX ORDER — PROPOFOL 10 MG/ML
INJECTION, EMULSION INTRAVENOUS PRN
Status: DISCONTINUED | OUTPATIENT
Start: 2023-08-10 | End: 2023-08-10 | Stop reason: SDUPTHER

## 2023-08-10 RX ORDER — AMLODIPINE BESYLATE 10 MG/1
10 TABLET ORAL DAILY
Status: DISCONTINUED | OUTPATIENT
Start: 2023-08-11 | End: 2023-08-10 | Stop reason: HOSPADM

## 2023-08-10 RX ORDER — OXYCODONE HYDROCHLORIDE 5 MG/1
5 TABLET ORAL EVERY 4 HOURS PRN
Status: DISCONTINUED | OUTPATIENT
Start: 2023-08-10 | End: 2023-08-10 | Stop reason: HOSPADM

## 2023-08-10 RX ORDER — OXYCODONE HYDROCHLORIDE 5 MG/1
10 TABLET ORAL EVERY 4 HOURS PRN
Status: DISCONTINUED | OUTPATIENT
Start: 2023-08-10 | End: 2023-08-10 | Stop reason: HOSPADM

## 2023-08-10 RX ORDER — MIDAZOLAM HYDROCHLORIDE 2 MG/2ML
2 INJECTION, SOLUTION INTRAMUSCULAR; INTRAVENOUS
Status: DISCONTINUED | OUTPATIENT
Start: 2023-08-10 | End: 2023-08-10 | Stop reason: HOSPADM

## 2023-08-10 RX ORDER — HALOPERIDOL 5 MG/ML
1 INJECTION INTRAMUSCULAR
Status: DISCONTINUED | OUTPATIENT
Start: 2023-08-10 | End: 2023-08-10 | Stop reason: HOSPADM

## 2023-08-10 RX ORDER — KETOROLAC TROMETHAMINE 30 MG/ML
INJECTION, SOLUTION INTRAMUSCULAR; INTRAVENOUS PRN
Status: DISCONTINUED | OUTPATIENT
Start: 2023-08-10 | End: 2023-08-10 | Stop reason: ALTCHOICE

## 2023-08-10 RX ORDER — SODIUM CHLORIDE 9 MG/ML
INJECTION, SOLUTION INTRAVENOUS CONTINUOUS
Status: DISCONTINUED | OUTPATIENT
Start: 2023-08-10 | End: 2023-08-10 | Stop reason: HOSPADM

## 2023-08-10 RX ADMIN — FENTANYL CITRATE 25 MCG: 50 INJECTION, SOLUTION INTRAMUSCULAR; INTRAVENOUS at 09:03

## 2023-08-10 RX ADMIN — ONDANSETRON 4 MG: 2 INJECTION INTRAMUSCULAR; INTRAVENOUS at 08:30

## 2023-08-10 RX ADMIN — PROPOFOL 50 MCG/KG/MIN: 10 INJECTION, EMULSION INTRAVENOUS at 08:39

## 2023-08-10 RX ADMIN — TRANEXAMIC ACID 1000 MG: 100 INJECTION, SOLUTION INTRAVENOUS at 08:39

## 2023-08-10 RX ADMIN — MIDAZOLAM 1 MG: 1 INJECTION INTRAMUSCULAR; INTRAVENOUS at 08:29

## 2023-08-10 RX ADMIN — GLYCOPYRROLATE 0.1 MG: 0.2 INJECTION INTRAMUSCULAR; INTRAVENOUS at 08:46

## 2023-08-10 RX ADMIN — PROPOFOL 20 MG: 10 INJECTION, EMULSION INTRAVENOUS at 08:38

## 2023-08-10 RX ADMIN — Medication 10 MG: at 09:11

## 2023-08-10 RX ADMIN — PHENYLEPHRINE HYDROCHLORIDE 100 MCG: 10 INJECTION INTRAVENOUS at 08:57

## 2023-08-10 RX ADMIN — LIDOCAINE HYDROCHLORIDE 1 ML: 10 INJECTION, SOLUTION INFILTRATION; PERINEURAL at 07:40

## 2023-08-10 RX ADMIN — LIDOCAINE HYDROCHLORIDE 50 MG: 20 INJECTION, SOLUTION EPIDURAL; INFILTRATION; INTRACAUDAL; PERINEURAL at 08:38

## 2023-08-10 RX ADMIN — ACETAMINOPHEN 1000 MG: 500 TABLET, FILM COATED ORAL at 07:38

## 2023-08-10 RX ADMIN — PHENYLEPHRINE HYDROCHLORIDE 100 MCG: 10 INJECTION INTRAVENOUS at 09:13

## 2023-08-10 RX ADMIN — PROPOFOL 30 MG: 10 INJECTION, EMULSION INTRAVENOUS at 08:56

## 2023-08-10 RX ADMIN — PHENYLEPHRINE HYDROCHLORIDE 40 MCG/MIN: 10 INJECTION INTRAVENOUS at 09:06

## 2023-08-10 RX ADMIN — Medication 2000 MG: at 08:39

## 2023-08-10 RX ADMIN — Medication 10 MG: at 09:25

## 2023-08-10 RX ADMIN — SENNOSIDES AND DOCUSATE SODIUM 1 TABLET: 50; 8.6 TABLET ORAL at 11:48

## 2023-08-10 RX ADMIN — SODIUM CHLORIDE, SODIUM LACTATE, POTASSIUM CHLORIDE, AND CALCIUM CHLORIDE: 600; 310; 30; 20 INJECTION, SOLUTION INTRAVENOUS at 08:52

## 2023-08-10 RX ADMIN — MEPIVACAINE HYDROCHLORIDE 60 MG: 20 INJECTION, SOLUTION EPIDURAL; INFILTRATION at 08:36

## 2023-08-10 RX ADMIN — DEXAMETHASONE SODIUM PHOSPHATE 10 MG: 10 INJECTION INTRAMUSCULAR; INTRAVENOUS at 08:39

## 2023-08-10 RX ADMIN — OXYCODONE HYDROCHLORIDE 5 MG: 5 TABLET ORAL at 10:26

## 2023-08-10 RX ADMIN — Medication 10 MG: at 09:13

## 2023-08-10 RX ADMIN — SODIUM CHLORIDE, SODIUM LACTATE, POTASSIUM CHLORIDE, AND CALCIUM CHLORIDE: 600; 310; 30; 20 INJECTION, SOLUTION INTRAVENOUS at 07:39

## 2023-08-10 ASSESSMENT — PAIN SCALES - GENERAL: PAINLEVEL_OUTOF10: 4

## 2023-08-10 ASSESSMENT — PAIN DESCRIPTION - LOCATION: LOCATION: HIP

## 2023-08-10 ASSESSMENT — PAIN - FUNCTIONAL ASSESSMENT: PAIN_FUNCTIONAL_ASSESSMENT: 0-10

## 2023-08-10 ASSESSMENT — PAIN DESCRIPTION - ORIENTATION: ORIENTATION: LEFT

## 2023-08-10 NOTE — CARE COORDINATION
Patient is a 74y.o. year old female admitted for Left MICHAEL . Patient plans to return home on discharge. Order received to arrange home health. Patient requesting [place] [facility] with [facility]. Referral sent to [facility]. Patient denies any equipment needs as patient has a walker. Will follow until discharge. 08/10/23 1655   Service Assessment   Patient Orientation Alert and Oriented   Cognition Alert   History Provided By Patient   Services At/After Discharge   Transition of Care Consult (CM Consult) [address] Discharge Home Health;PT   Mode of Transport at Discharge Self   Confirm Follow Up Transport Self   Condition of Participation: Discharge Planning   The Plan for Transition of Care is related to the following treatment goals: improve mobility   The Patient and/or Patient Representative was provided with a Choice of Provider? Patient   The Patient and/Or Patient Representative agree with the Discharge Plan? Yes   Freedom of Choice list was provided with basic dialogue that supports the patient's individualized plan of care/goals, treatment preferences, and shares the quality data associated with the providers?   Yes

## 2023-08-10 NOTE — ANESTHESIA PROCEDURE NOTES
Spinal Block    Patient location during procedure: OR  End time: 8/10/2023 8:38 AM  Reason for block: primary anesthetic  Staffing  Performed: anesthesiologist   Anesthesiologist: Katie Silva MD  Spinal Block  Patient position: sitting  Prep: ChloraPrep  Patient monitoring: continuous pulse ox and oxygen  Approach: midline  Location: L3/L4  Provider prep: mask and sterile gloves  Local infiltration: lidocaine  Needle  Needle type: pencil-tip   Needle gauge: 25 G  Needle length: 3.5 in  Assessment  Swirl obtained: Yes  CSF: clear  Attempts: 1  Hemodynamics: stable  Additional Notes  Uneventful spinal block  Preanesthetic Checklist  Completed: patient identified, IV checked, site marked, risks and benefits discussed, surgical/procedural consents, equipment checked, pre-op evaluation, timeout performed, anesthesia consent given, oxygen available, monitors applied/VS acknowledged, fire risk safety assessment completed and verbalized and blood product R/B/A discussed and consented

## 2023-08-10 NOTE — OP NOTE
placed and the appropriate capsulotomy performed. Soft tissue was removed from the acetabulum. The acetabulum was sequentially reamed. Using trial components the acetabulum was sized to a 54 mm acetabular cup. Utilizing the femoral retractor, the canal was prepared with appropriate laterization and reamed with the starter reamer. The canal was then broached progressively to size 5. The calcar planar was utilized. A trial reduction with a size +8.5 neck length was utilized. The Head size was 36mm. This was found to be the most stable to flexion greater than 90 degrees and an internal and external rotation. Limb lengths were found to be equilibrated and with appropriate stability as mentioned above. All trial components were removed. The cementless permanent stem was impacted into place. A trial reduction was performed and the appropiate neck length was selected. A permanent 36mm femoral head was impacted into place. Ileana Unger's hip was reduced and the stability was as mentioned as above. The betadine lavage protocol was used. The sciatic nerve was palpated and noted to be intact. The abductors were repaired through drill holes in the greater trochanter. The remainder was closed in layers with a Zipline closure for the skin. The patient was then rolled to a supine position. The sponge and needle counts were correct. The patient tolerated the procedure without difficulty and left the operating room in satisfactory condition. EBL:300cc  Additional Findings: Severe DJD  Implants:   Implant Name Type Inv.  Item Serial No.  Lot No. LRB No. Used Action   SHELL ACET [id number] HIP TI GRIPTION HERACLIO SLD CAITLIN [facility] - [id number]  SHELL ACET [id number] HIP TI GRIPTION HERACLIO SLD CAITLIN [facility] [employer]-WD [id number] Left 1 Implanted   ELIMINATOR H APEX FOR 48-60MM PINN HIP SHELL - [id number]  ELIMINATOR H APEX FOR 48-60MM PINN HIP SHELL  [facility] [employer]

## 2023-08-10 NOTE — PROGRESS NOTES
Discharge instructions reviewed and copy provided for pt. Opportunity provided for questions. Iv was removed without difficulty.
Pt received to room. Pt alert and oriented. Pt oriented to room and bed controls. Spouse at bedside.
TRANSFER - IN REPORT:    Verbal report received from [facility], [address] [place] [address] on Robby Carpenter  being received from  PACU for routine post-op      Report consisted of patient's Situation, Background, Assessment and   Recommendations(SBAR). Information from the following report(s) Nurse Handoff Report was reviewed with the receiving nurse. Opportunity for questions and clarification was provided. Assessment completed upon patient's arrival to unit and care assumed.
includes using toilet, bedpan, or urinal)?: None  How much help is needed for putting on and taking off regular upper body clothing?: None  How much help is needed for taking care of personal grooming?: None  How much help for eating meals?: None  AM-PAC Inpatient Daily Activity Raw Score: 24  AM-PAC Inpatient ADL T-Scale Score : 57.54  ADL Inpatient CMS 0-100% Score: 0  ADL Inpatient CMS G-Code Modifier : [address] [place] [address]     SUBJECTIVE:     Ms. Aramis Villalta states, \"I am suppose to go home\"      Social/Functional   Lives With: Spouse  Type of Home: House  Home Layout: One level  Home Access: Stairs to enter with rails  Entrance Stairs - Number of Steps: 3  Entrance Stairs - Rails: Both (can't reach both)  Bathroom Shower/Tub: Walk-in shower  Bathroom Equipment: 3-in-1 commode  Home Equipment: [employer] [place] [employer] (will borrow RW from [employer])    OBJECTIVE:     Vera Peace / Izabella Ball / Richard Orourke: None    RESTRICTIONS/PRECAUTIONS:  Restrictions/Precautions: Weight Bearing  Left Lower Extremity Weight Bearing: Weight Bearing As Tolerated    PAIN: VITALS / O2:   Pre Treatment:          Post Treatment: none Vitals          Oxygen        GROSS EVALUATION: INTACT IMPAIRED   (See Comments)   UE AROM [x] []   UE PROM [x] []   Strength [x]       Posture / Balance [x]     Sensation [x]     Coordination [x]       Tone [x]       Edema []    Activity Tolerance [x]       Hand Dominance R [] L []      COGNITION/  PERCEPTION: INTACT IMPAIRED   (See Comments)   Orientation [x]     Vision [x]     Hearing [x]     Cognition  [x]     Perception [x]       MOBILITY: I Mod I S SBA CGA Min Mod Max Total  NT x2 Comments:   Bed Mobility    Rolling [] [] [] [] [] [] [] [] [] [] []    Supine to Sit [] [] [] [x] [] [] [] [] [] [] []    Scooting [] [] [] [x] [] [] [] [] [] [] []    Sit to Supine [] [] [] [] [] [] [] [] [] [] []    Transfers    Sit to Stand [] [] [] [x] [] [] [] [] [] [] []    Bed to Chair [] [] [] [x] [] [] [] [] [] [] []    Stand to Sit [] [] [] [x] [] [] [] [] [] [] []
1135  Time Out: [address]  Minutes: [address], PT

## 2023-08-10 NOTE — DISCHARGE SUMMARY
[facility]  Total Joint Discharge Summary      Patient ID:  Kelvin Camacho  [id number]  60 y.o.  1950    Admit date: 8/10/2023  Discharge date and time: 08/10/23   Admitting Physician: Deanna Casiano MD  Surgeon: Same  Admission Diagnoses: Primary osteoarthritis of left hip [M16.12]  Discharge Diagnoses: Principal Problem:    Primary osteoarthritis of left hip  Resolved Problems:    * No resolved hospital problems. *                              Perioperative Antibiotics: Ancef 1 to 3 g was given depending on patient's weight. If allergic to Ancef or due to other indications, patient was given Vancomycin/Gent per protocol      Hospital Medications given:   [START ON 8/11/2023] amLODIPine, 10 mg, Daily  [START ON 8/11/2023] amphetamine-dextroamphetamine, 30 mg, Daily  DULoxetine, 60 mg, BID  famotidine, 20 mg, Dinner  [START ON 8/11/2023] losartan, 50 mg, Daily  rosuvastatin, 10 mg, Nightly  sodium chloride flush, 5-40 mL, 2 times per day  ceFAZolin (ANCEF) IVPB, 2,000 mg, Q8H  acetaminophen, 650 mg, Q6H  sennosides-docusate sodium, 1 tablet, BID  aspirin, 81 mg, BID  [START ON 8/11/2023] dexamethasone, 10 mg, Q6H      sodium chloride  sodium chloride      sodium chloride flush, 5-40 mL, PRN  sodium chloride, , PRN  oxyCODONE, 5 mg, Q4H PRN   Or  oxyCODONE, 10 mg, Q4H PRN  HYDROmorphone, 0.5 mg, Q3H PRN   Or  HYDROmorphone, 1 mg, Q3H PRN  promethazine, 25 mg, Q6H PRN   Or  ondansetron, 4 mg, Q6H PRN  aluminum & magnesium hydroxide-simethicone, 15 mL, Q6H PRN  diphenhydrAMINE, 25 mg, Q6H PRN   Or  diphenhydrAMINE, 25 mg, Q6H PRN  methocarbamol, 750 mg, 4x Daily PRN  naloxone, 0.4 mg, PRN        Discharge Medications given:  Current Discharge Medication List        CONTINUE these medications which have NOT CHANGED    Details   oxyCODONE (ROXICODONE) 5 MG immediate release tablet Take 1-2 tablets by mouth every 4 hours as needed for Pain for up to 7 days.  Max Daily Amount: 60 mg  Qty: 60

## 2023-08-10 NOTE — ANESTHESIA PRE PROCEDURE
07/18/2023 08:13 AM    LABGLOM 91 03/14/2022 10:55 AM    GLUCOSE 86 07/18/2023 08:13 AM    PROT 7.0 04/12/2023 11:41 AM    CALCIUM 9.4 07/18/2023 08:13 AM    BILITOT 0.8 04/12/2023 11:41 AM    ALKPHOS 94 04/12/2023 11:41 AM    ALKPHOS 95 03/14/2022 10:55 AM    AST 22 04/12/2023 11:41 AM    ALT 32 04/12/2023 11:41 AM       POC Tests: No results for input(s): POCGLU, POCNA, POCK, POCCL, POCBUN, POCHEMO, POCHCT in the last 72 hours. Coags:   Lab Results   Component Value Date/Time    PROTIME 13.0 07/18/2023 08:13 AM    INR 1.0 07/18/2023 08:13 AM    APTT 33.2 07/18/2023 08:13 AM    APTT 28.1 01/28/2020 10:45 AM       HCG (If Applicable): No results found for: PREGTESTUR, PREGSERUM, HCG, HCGQUANT     ABGs: No results found for: PHART, PO2ART, IQE7ONR, RHZ7NNL, BEART, R4QHJTDA     Type & Screen (If Applicable):  No results found for: LABABO, LABRH    Drug/Infectious Status (If Applicable):  Lab Results   Component Value Date/Time    HEPCAB <0.1 10/09/2017 10:03 AM       COVID-19 Screening (If Applicable): No results found for: COVID19        Anesthesia Evaluation  Patient summary reviewed and Nursing notes reviewed no history of anesthetic complications:   Airway: Mallampati: II  TM distance: >3 FB   Neck ROM: full  Mouth opening: > = 3 FB   Dental: normal exam         Pulmonary:Negative Pulmonary ROS and normal exam    (+) sleep apnea:                            ROS comment: Interstitial lung disease associated with SLE   Cardiovascular:  Exercise tolerance: good (>4 METS),   (+) hypertension:,     (-) past MI      Rhythm: regular  Rate: normal                    Neuro/Psych:   Negative Neuro/Psych ROS  (+) neuromuscular disease (Right sided sciatica):, psychiatric history (ADD):   (-) CVA           GI/Hepatic/Renal: Neg GI/Hepatic/Renal ROS            Endo/Other: Negative Endo/Other ROS   (+) : arthritis:., .                 Abdominal:             Vascular: negative vascular ROS.          Other Findings:

## 2023-08-10 NOTE — PERIOP NOTE
MD Sedlak at bedside with patient. Pt VSS stable. Pain and Nausea controlled at this time. Verbal sign out per MD when pacu care is completed. Plan of care continues.

## 2023-08-10 NOTE — DISCHARGE INSTRUCTIONS
[address] [place] [address]      Patient Discharge Instructions    Massimo Cummings / [id number] : 1950    Admitted 8/10/2023 Discharged: 8/10/2023     IF YOU HAVE ANY PROBLEMS ONCE YOU ARE AT HOME CALL THE FOLLOWING NUMBERS:   Main office number: [phone]      Medications    The medications you are to continue on are listed on the medication reconciliation sheet. Narcotic pain medications as well as supplemental iron can cause constipation. If this occurs try stopping the narcotic pain medication and/or the iron. It is important that you take the medication exactly as they are prescribed. Medications which increase your risk of blood clots are listed to stop for 5 weeks after surgery as well as medications or supplements which increase your risk of bleeding complications. Keep your medication in the bottles provided by the pharmacist and keep a list of the medication names, dosages, and times to be taken in your wallet. Do not take other medications without consulting your doctor. Important Information    Do NOT smoke as this will greatly increase your risk of infection! Resume your prehospital diet. If you have excessive nausea or vomitting call your doctor's office     Leg swelling and warmth is normal for 6 months after surgery. If you experience swelling in your leg elevate you leg while laying down with your toes above your heart. If you have sudden onset severe swelling with leg pain call our office. Use Ronnell Hose stockings until we see you in the office for your follow up appointment. The stitches deep inside take approximately 6 months to dissolve. There will be sharp shooting, stinging and burning pain. This is normal and will resolve between 3-6 months after surgery. Difficulty sleeping is normal following total Knee and Hip replacement. You may try melatonin, an over-the-counter sleep aid or benadryl to help with sleep.  Most patients will resume sleeping through the night 8

## 2023-08-10 NOTE — INTERVAL H&P NOTE
Update History & Physical    The patient's History and Physical of August 7, 23 was reviewed with the patient and I examined the patient. There was no change. The surgical site was confirmed by the patient and me. Plan: The risks, benefits, expected outcome, and alternative to the recommended procedure have been discussed with the patient. Patient understands and wants to proceed with the procedure.      Electronically signed by SUDHA Brown on 8/10/2023 at 7:03 AM

## 2023-08-10 NOTE — PERIOP NOTE
TRANSFER - OUT REPORT:    Verbal report given to Bhanuchanel Bingham on Shawn Ramsay  being transferred to room 334 for routine progression of patient care       Report consisted of patient's Situation, Background, Assessment and   Recommendations(SBAR). Information from the following report(s) Nurse Handoff Report, Adult Overview, and MAR was reviewed with the receiving nurse. Lines:   Peripheral IV 08/10/23 Posterior;Right Hand (Active)   Site Assessment Clean, dry & intact 08/10/23 1003   Line Status Infusing 08/10/23 1003   Line Care Connections checked and tightened 08/10/23 1003   Phlebitis Assessment No symptoms 08/10/23 1003   Infiltration Assessment 0 08/10/23 1003   Dressing Status Clean, dry & intact 08/10/23 1003   Dressing Type Transparent 08/10/23 1003        Opportunity for questions and clarification was provided.       Patient transported with:  O2 @ 0lpm

## 2023-08-11 ENCOUNTER — HOME CARE VISIT (OUTPATIENT)
Dept: SCHEDULING | Facility: HOME HEALTH | Age: 73
End: 2023-08-11
Payer: MEDICARE

## 2023-08-11 VITALS
HEART RATE: 78 BPM | TEMPERATURE: 97.4 F | DIASTOLIC BLOOD PRESSURE: 76 MMHG | RESPIRATION RATE: 14 BRPM | OXYGEN SATURATION: 98 % | SYSTOLIC BLOOD PRESSURE: 122 MMHG

## 2023-08-11 PROCEDURE — G0151 HHCP-SERV OF PT,EA 15 MIN: HCPCS

## 2023-08-11 ASSESSMENT — ENCOUNTER SYMPTOMS: PAIN LOCATION - PAIN QUALITY: ACHES, SORE

## 2023-08-11 NOTE — HOME HEALTH
SITUATION. 74year old female patient s/p L MICHAEL 8/10/23 per Dr Adelaida Salas. Hospitalized 8/10/23. History of ADD, arthritis, depression, HTN, interstitial lung disease, mitral valve prolapse, pneumonia, R sciatic nerve disease, systemic lupus erythematosus, unspecified sleep apnea   BACKGROUND. .Lives with . Home is single level with several steps to enter. Patient has a RW.   ASSESSMENT. Chan Adame Patient alert and oriented with good participation during visit. Pt presents with L MICHAEL with aquacel dressing in place and intact with no drainage saturating through and no signs or symptoms of infection. Treatment: Instructed patient in infection control, taking medication as directed, use and application of CP, and signs and symptoms of DVT.  Instructed supine and seated MICHAEL HEP protocol 20xeach of AP, QS, GS, hip abd/add, HS, SAQ  Frequency: 1w1, 3w1, 2w2  Follow up: MICHAEL protocol

## 2023-08-15 ENCOUNTER — HOME CARE VISIT (OUTPATIENT)
Dept: SCHEDULING | Facility: HOME HEALTH | Age: 73
End: 2023-08-15
Payer: MEDICARE

## 2023-08-15 PROCEDURE — G0157 HHC PT ASSISTANT EA 15: HCPCS

## 2023-08-16 ENCOUNTER — TELEPHONE (OUTPATIENT)
Dept: ORTHOPEDIC SURGERY | Age: 73
End: 2023-08-16

## 2023-08-16 ENCOUNTER — HOME CARE VISIT (OUTPATIENT)
Dept: SCHEDULING | Facility: HOME HEALTH | Age: 73
End: 2023-08-16
Payer: MEDICARE

## 2023-08-16 VITALS
DIASTOLIC BLOOD PRESSURE: 66 MMHG | SYSTOLIC BLOOD PRESSURE: 122 MMHG | TEMPERATURE: 97 F | RESPIRATION RATE: 16 BRPM | OXYGEN SATURATION: 97 % | HEART RATE: 100 BPM

## 2023-08-16 PROCEDURE — G0157 HHC PT ASSISTANT EA 15: HCPCS

## 2023-08-16 NOTE — TELEPHONE ENCOUNTER
FYI  She fell last night. She has an abrasion on her let knee. She has a bruise over her right eye and her glasses scraped her face on the right side. Hip seems okay. She is just sore today.

## 2023-08-17 VITALS
RESPIRATION RATE: 16 BRPM | SYSTOLIC BLOOD PRESSURE: 124 MMHG | HEART RATE: 95 BPM | OXYGEN SATURATION: 99 % | TEMPERATURE: 97.4 F | DIASTOLIC BLOOD PRESSURE: 70 MMHG

## 2023-08-17 ASSESSMENT — ENCOUNTER SYMPTOMS: PAIN LOCATION - PAIN QUALITY: ACHES.SORE

## 2023-08-17 NOTE — CASE COMMUNICATION
patient reports falling on 8/15/23 around 445pm while attempting to reenter home from front porch. lost balance forward. injured left knee and right side of face. patient called neighbor and she assisted patient to her feet. notified Ibis Bailey at 10am on 8/16/23.        dulcolax   1 to 3 tablets by mouth as needed for constipation . started 8/15/23

## 2023-08-18 ENCOUNTER — HOME CARE VISIT (OUTPATIENT)
Dept: SCHEDULING | Facility: HOME HEALTH | Age: 73
End: 2023-08-18
Payer: MEDICARE

## 2023-08-18 VITALS
RESPIRATION RATE: 16 BRPM | HEART RATE: 70 BPM | SYSTOLIC BLOOD PRESSURE: 120 MMHG | DIASTOLIC BLOOD PRESSURE: 60 MMHG | TEMPERATURE: 97.2 F | OXYGEN SATURATION: 97 %

## 2023-08-18 PROCEDURE — G0157 HHC PT ASSISTANT EA 15: HCPCS

## 2023-08-18 ASSESSMENT — ENCOUNTER SYMPTOMS: PAIN LOCATION - PAIN QUALITY: SORE.ACHES

## 2023-08-21 ENCOUNTER — HOME CARE VISIT (OUTPATIENT)
Dept: SCHEDULING | Facility: HOME HEALTH | Age: 73
End: 2023-08-21
Payer: MEDICARE

## 2023-08-21 PROCEDURE — G0157 HHC PT ASSISTANT EA 15: HCPCS

## 2023-08-22 VITALS
SYSTOLIC BLOOD PRESSURE: 122 MMHG | DIASTOLIC BLOOD PRESSURE: 70 MMHG | RESPIRATION RATE: 16 BRPM | HEART RATE: 85 BPM | TEMPERATURE: 98 F | OXYGEN SATURATION: 99 %

## 2023-08-22 ASSESSMENT — ENCOUNTER SYMPTOMS: PAIN LOCATION - PAIN QUALITY: SORE.ACHES

## 2023-08-24 ENCOUNTER — HOME CARE VISIT (OUTPATIENT)
Dept: SCHEDULING | Facility: HOME HEALTH | Age: 73
End: 2023-08-24
Payer: MEDICARE

## 2023-08-24 ENCOUNTER — TELEPHONE (OUTPATIENT)
Dept: ORTHOPEDIC SURGERY | Age: 73
End: 2023-08-24

## 2023-08-24 VITALS
DIASTOLIC BLOOD PRESSURE: 78 MMHG | HEART RATE: 100 BPM | RESPIRATION RATE: 16 BRPM | OXYGEN SATURATION: 98 % | TEMPERATURE: 97 F | SYSTOLIC BLOOD PRESSURE: 122 MMHG

## 2023-08-24 PROCEDURE — G0157 HHC PT ASSISTANT EA 15: HCPCS

## 2023-08-24 ASSESSMENT — ENCOUNTER SYMPTOMS: PAIN LOCATION - PAIN QUALITY: SORE.ACHES

## 2023-08-24 NOTE — TELEPHONE ENCOUNTER
Rocio Flores is calling to report that this week her thigh is more swollen and she is still on the walker because she cant bear much weight on the leg. She took a picture of her thigh as well. If there is anything they need to do different let her know.

## 2023-08-24 NOTE — TELEPHONE ENCOUNTER
Left a vm for patient letting her know I talked with Vesta Woods, therapist, and was following up on her message.

## 2023-08-25 NOTE — CASE COMMUNICATION
increased swelling in left thigh and muscle guarding. picture taken of both legs in supine via [employer] ramos for chart. notifed [email or web address] office on nurses line regarding pain and swelling lle . performed 5 minute massaage to left thigh with good pain relief. instructed patient to continue with ice. rest and take musle relaxer until thigh pain decreases. patient to continue to use rw for ambulation. 2 magnesuim pills last night to  help sleep. states her neice whose a nurse told her it would help her sleep. no bottle available for dosage    Call back from [address] and states continue to monitor patient.have patient take painmeds and muscle relaxers. talked with patient and patient recieved call from MA. Patient is feeling better with painmeds.  muscle relaxer and rest.

## 2023-08-27 ENCOUNTER — HOME CARE VISIT (OUTPATIENT)
Dept: HOME HEALTH SERVICES | Facility: HOME HEALTH | Age: 73
End: 2023-08-27
Payer: MEDICARE

## 2023-08-28 ENCOUNTER — HOME CARE VISIT (OUTPATIENT)
Dept: SCHEDULING | Facility: HOME HEALTH | Age: 73
End: 2023-08-28
Payer: MEDICARE

## 2023-08-28 DIAGNOSIS — M16.12 PRIMARY OSTEOARTHRITIS OF LEFT HIP: Primary | ICD-10-CM

## 2023-08-28 PROCEDURE — G0157 HHC PT ASSISTANT EA 15: HCPCS

## 2023-08-28 RX ORDER — ASPIRIN 81 MG/1
81 TABLET ORAL 2 TIMES DAILY
Qty: 30 TABLET | Refills: 0 | Status: SHIPPED | OUTPATIENT
Start: 2023-08-28

## 2023-08-29 VITALS
DIASTOLIC BLOOD PRESSURE: 68 MMHG | HEART RATE: 100 BPM | OXYGEN SATURATION: 98 % | TEMPERATURE: 97.6 F | SYSTOLIC BLOOD PRESSURE: 122 MMHG | RESPIRATION RATE: 16 BRPM

## 2023-08-29 ASSESSMENT — ENCOUNTER SYMPTOMS: PAIN LOCATION - PAIN QUALITY: SORE.ACHES

## 2023-08-29 NOTE — CASE COMMUNICATION
reviewed meds and will  run out of aspirin. dosage on bottle states take 2 for 15 days. contacted  office and left message with MA. Recieved message and stated patient to continue 2 times a day with aspirin for a total of 30 days. Contacted patient later in day and informed that new perscrption was sent to [facility] per patient.

## 2023-08-31 ENCOUNTER — HOME CARE VISIT (OUTPATIENT)
Dept: SCHEDULING | Facility: HOME HEALTH | Age: 73
End: 2023-08-31
Payer: MEDICARE

## 2023-08-31 VITALS
OXYGEN SATURATION: 97 % | DIASTOLIC BLOOD PRESSURE: 63 MMHG | TEMPERATURE: 97.5 F | HEART RATE: 100 BPM | RESPIRATION RATE: 16 BRPM | SYSTOLIC BLOOD PRESSURE: 122 MMHG

## 2023-08-31 PROCEDURE — G0151 HHCP-SERV OF PT,EA 15 MIN: HCPCS

## 2023-08-31 ASSESSMENT — ENCOUNTER SYMPTOMS: PAIN LOCATION - PAIN QUALITY: SORE, ACHY

## 2023-09-05 ENCOUNTER — HOME CARE VISIT (OUTPATIENT)
Dept: SCHEDULING | Facility: HOME HEALTH | Age: 73
End: 2023-09-05
Payer: MEDICARE

## 2023-09-05 VITALS
OXYGEN SATURATION: 99 % | SYSTOLIC BLOOD PRESSURE: 122 MMHG | HEART RATE: 100 BPM | RESPIRATION RATE: 16 BRPM | DIASTOLIC BLOOD PRESSURE: 64 MMHG | TEMPERATURE: 97 F

## 2023-09-05 PROCEDURE — G0157 HHC PT ASSISTANT EA 15: HCPCS

## 2023-09-05 ASSESSMENT — ENCOUNTER SYMPTOMS: PAIN LOCATION - PAIN QUALITY: SORE

## 2023-09-07 ENCOUNTER — HOME CARE VISIT (OUTPATIENT)
Dept: SCHEDULING | Facility: HOME HEALTH | Age: 73
End: 2023-09-07
Payer: MEDICARE

## 2023-09-07 ENCOUNTER — HOME CARE VISIT (OUTPATIENT)
Dept: HOME HEALTH SERVICES | Facility: HOME HEALTH | Age: 73
End: 2023-09-07
Payer: MEDICARE

## 2023-09-07 VITALS
OXYGEN SATURATION: 98 % | SYSTOLIC BLOOD PRESSURE: 118 MMHG | DIASTOLIC BLOOD PRESSURE: 68 MMHG | RESPIRATION RATE: 16 BRPM | TEMPERATURE: 97.2 F | HEART RATE: 100 BPM

## 2023-09-07 PROCEDURE — G0157 HHC PT ASSISTANT EA 15: HCPCS

## 2023-09-07 ASSESSMENT — ENCOUNTER SYMPTOMS: PAIN LOCATION - PAIN QUALITY: SORE.ACHES

## 2023-09-11 ENCOUNTER — OFFICE VISIT (OUTPATIENT)
Dept: ORTHOPEDIC SURGERY | Age: 73
End: 2023-09-11

## 2023-09-11 DIAGNOSIS — Z09 FOLLOW-UP EXAMINATION: ICD-10-CM

## 2023-09-11 DIAGNOSIS — Z96.642 H/O TOTAL HIP ARTHROPLASTY, LEFT: ICD-10-CM

## 2023-09-11 DIAGNOSIS — M16.12 PRIMARY OSTEOARTHRITIS OF LEFT HIP: Primary | ICD-10-CM

## 2023-09-11 PROCEDURE — 99024 POSTOP FOLLOW-UP VISIT: CPT | Performed by: PHYSICIAN ASSISTANT

## 2023-09-11 NOTE — PROGRESS NOTES
Name: Meghan Buchanan  YOB: 1950  Gender: female  MRN: [id number]    LEFT Post-Op MICHAEL: 4 weeks    This patient returns now 4 weeks s/p MICHAEL. They are doing well. There have been no significant issues since last visit. Patient is anxious to increase level of activity. Today they complain of no significant symptoms. PE: On exam today, incision looks good. The patient is ambulating with a steady gait with the use of CANE. There is minimal discomfort with gentle range of motion of the operative hip. RADIOGRAPHS:  AP pelvis and table lateral of the LEFT hip which demonstrate well fixed implants in good position. No sign of fracture or concern. RADIOGRAPHIC IMPRESSION:  Stable LEFTTHA. CLINICAL IMPRESSION AND PLAN:  Now four weeks s/p LEFT MICHAEL. I advised them to continue to wean from their current assistive device and to resume activities as tolerated. Further activity was discussed with the patient as was further pain medications. The patient will return in 4-5 months.     Work/Activity Restrictions: NOT APPLICABLE    SUDHA Obrien

## 2023-09-13 ENCOUNTER — HOME CARE VISIT (OUTPATIENT)
Dept: SCHEDULING | Facility: HOME HEALTH | Age: 73
End: 2023-09-13
Payer: MEDICARE

## 2023-09-13 VITALS
TEMPERATURE: 98.4 F | DIASTOLIC BLOOD PRESSURE: 64 MMHG | RESPIRATION RATE: 16 BRPM | HEART RATE: 89 BPM | OXYGEN SATURATION: 97 % | SYSTOLIC BLOOD PRESSURE: 108 MMHG

## 2023-09-13 PROCEDURE — G0151 HHCP-SERV OF PT,EA 15 MIN: HCPCS

## 2023-09-13 ASSESSMENT — ENCOUNTER SYMPTOMS: PAIN LOCATION - PAIN QUALITY: SORE

## 2023-12-04 ENCOUNTER — OFFICE VISIT (OUTPATIENT)
Dept: RHEUMATOLOGY | Age: 73
End: 2023-12-04
Payer: MEDICARE

## 2023-12-04 VITALS
SYSTOLIC BLOOD PRESSURE: 121 MMHG | BODY MASS INDEX: 24.11 KG/M2 | WEIGHT: 150 LBS | HEART RATE: 98 BPM | HEIGHT: 66 IN | DIASTOLIC BLOOD PRESSURE: 77 MMHG

## 2023-12-04 DIAGNOSIS — Z79.899 LONG-TERM USE OF HIGH-RISK MEDICATION: ICD-10-CM

## 2023-12-04 DIAGNOSIS — M32.9 SYSTEMIC LUPUS ERYTHEMATOSUS, UNSPECIFIED SLE TYPE, UNSPECIFIED ORGAN INVOLVEMENT STATUS (HCC): ICD-10-CM

## 2023-12-04 DIAGNOSIS — I73.00 RAYNAUD'S DISEASE WITHOUT GANGRENE: ICD-10-CM

## 2023-12-04 DIAGNOSIS — M32.9 SYSTEMIC LUPUS ERYTHEMATOSUS, UNSPECIFIED SLE TYPE, UNSPECIFIED ORGAN INVOLVEMENT STATUS (HCC): Primary | ICD-10-CM

## 2023-12-04 LAB
ALBUMIN SERPL-MCNC: 4.1 G/DL (ref 3.2–4.6)
ALBUMIN/GLOB SERPL: 1.1 (ref 0.4–1.6)
ALP SERPL-CCNC: 125 U/L (ref 50–136)
ALT SERPL-CCNC: 30 U/L (ref 12–65)
ANION GAP SERPL CALC-SCNC: 6 MMOL/L (ref 2–11)
APPEARANCE UR: CLEAR
AST SERPL-CCNC: 23 U/L (ref 15–37)
BACTERIA URNS QL MICRO: NEGATIVE /HPF
BASOPHILS # BLD: 0 K/UL (ref 0–0.2)
BASOPHILS NFR BLD: 1 % (ref 0–2)
BILIRUB SERPL-MCNC: 0.3 MG/DL (ref 0.2–1.1)
BILIRUB UR QL: NEGATIVE
BUN SERPL-MCNC: 11 MG/DL (ref 8–23)
CALCIUM SERPL-MCNC: 9.6 MG/DL (ref 8.3–10.4)
CASTS URNS QL MICRO: ABNORMAL /LPF (ref 0–2)
CHLORIDE SERPL-SCNC: 107 MMOL/L (ref 101–110)
CO2 SERPL-SCNC: 26 MMOL/L (ref 21–32)
COLOR UR: ABNORMAL
CREAT SERPL-MCNC: 0.8 MG/DL (ref 0.6–1)
DIFFERENTIAL METHOD BLD: ABNORMAL
EOSINOPHIL # BLD: 0.2 K/UL (ref 0–0.8)
EOSINOPHIL NFR BLD: 4 % (ref 0.5–7.8)
EPI CELLS #/AREA URNS HPF: ABNORMAL /HPF (ref 0–5)
ERYTHROCYTE [DISTWIDTH] IN BLOOD BY AUTOMATED COUNT: 14.1 % (ref 11.9–14.6)
GLOBULIN SER CALC-MCNC: 3.7 G/DL (ref 2.8–4.5)
GLUCOSE SERPL-MCNC: 72 MG/DL (ref 65–100)
GLUCOSE UR STRIP.AUTO-MCNC: NEGATIVE MG/DL
HCT VFR BLD AUTO: 39.9 % (ref 35.8–46.3)
HGB BLD-MCNC: 12.4 G/DL (ref 11.7–15.4)
HGB UR QL STRIP: NEGATIVE
IMM GRANULOCYTES # BLD AUTO: 0 K/UL (ref 0–0.5)
IMM GRANULOCYTES NFR BLD AUTO: 0 % (ref 0–5)
KETONES UR QL STRIP.AUTO: NEGATIVE MG/DL
LEUKOCYTE ESTERASE UR QL STRIP.AUTO: ABNORMAL
LYMPHOCYTES # BLD: 0.7 K/UL (ref 0.5–4.6)
LYMPHOCYTES NFR BLD: 18 % (ref 13–44)
MCH RBC QN AUTO: 28.8 PG (ref 26.1–32.9)
MCHC RBC AUTO-ENTMCNC: 31.1 G/DL (ref 31.4–35)
MCV RBC AUTO: 92.8 FL (ref 82–102)
MONOCYTES # BLD: 0.5 K/UL (ref 0.1–1.3)
MONOCYTES NFR BLD: 14 % (ref 4–12)
MUCOUS THREADS URNS QL MICRO: 0 /LPF
NEUTS SEG # BLD: 2.3 K/UL (ref 1.7–8.2)
NEUTS SEG NFR BLD: 63 % (ref 43–78)
NITRITE UR QL STRIP.AUTO: NEGATIVE
NRBC # BLD: 0 K/UL (ref 0–0.2)
PH UR STRIP: 6.5 (ref 5–9)
PLATELET # BLD AUTO: 179 K/UL (ref 150–450)
PMV BLD AUTO: 10.9 FL (ref 9.4–12.3)
POTASSIUM SERPL-SCNC: 3.7 MMOL/L (ref 3.5–5.1)
PROT SERPL-MCNC: 7.8 G/DL (ref 6.3–8.2)
PROT UR STRIP-MCNC: NEGATIVE MG/DL
RBC # BLD AUTO: 4.3 M/UL (ref 4.05–5.2)
RBC #/AREA URNS HPF: ABNORMAL /HPF (ref 0–5)
SODIUM SERPL-SCNC: 139 MMOL/L (ref 133–143)
SP GR UR REFRACTOMETRY: 1.02 (ref 1–1.02)
URINE CULTURE IF INDICATED: ABNORMAL
UROBILINOGEN UR QL STRIP.AUTO: 0.2 EU/DL (ref 0.2–1)
WBC # BLD AUTO: 3.6 K/UL (ref 4.3–11.1)
WBC URNS QL MICRO: ABNORMAL /HPF (ref 0–4)

## 2023-12-04 PROCEDURE — 1123F ACP DISCUSS/DSCN MKR DOCD: CPT | Performed by: INTERNAL MEDICINE

## 2023-12-04 PROCEDURE — 3074F SYST BP LT 130 MM HG: CPT | Performed by: INTERNAL MEDICINE

## 2023-12-04 PROCEDURE — 3078F DIAST BP <80 MM HG: CPT | Performed by: INTERNAL MEDICINE

## 2023-12-04 PROCEDURE — 99214 OFFICE O/P EST MOD 30 MIN: CPT | Performed by: INTERNAL MEDICINE

## 2023-12-04 RX ORDER — HYDROXYCHLOROQUINE SULFATE 200 MG/1
200 TABLET, FILM COATED ORAL DAILY
Qty: 90 TABLET | Refills: 1 | Status: SHIPPED | OUTPATIENT
Start: 2023-12-04

## 2023-12-04 ASSESSMENT — ROUTINE ASSESSMENT OF PATIENT INDEX DATA (RAPID3)
ON A SCALE OF ONE TO TEN, HOW MUCH OF A PROBLEM HAS UNUSUAL FATIGUE OR TIREDNESS BEEN FOR YOU OVER THE PAST WEEK?: 5
ON A SCALE OF ONE TO TEN, CONSIDERING ALL THE WAYS IN WHICH ILLNESS AND HEALTH CONDITIONS MAY AFFECT YOU AT THIS TIME, PLEASE INDICATE BELOW HOW YOU ARE DOING:: 2
WHEN YOU AWAKENED IN THE MORNING OVER THE LAST WEEK, PLEASE INDICATE THE AMOUNT OF TIME IT TAKES UNTIL YOU ARE AS LIMBER AS YOU WILL BE FOR THE DAY: < 10 MIN
ON A SCALE OF ONE TO TEN, HOW MUCH PAIN HAVE YOU HAD BECAUSE OF YOUR CONDITION OVER THE PAST WEEK?: 2
ON A SCALE OF ONE TO TEN, HOW DIFFICULT WAS IT FOR YOU TO COMPLETE THE LISTED DAILY PHYSICAL TASKS OVER THE LAST WEEK: 0.6

## 2023-12-04 ASSESSMENT — JOINT PAIN
TOTAL NUMBER OF SWOLLEN JOINTS: 0
TOTAL NUMBER OF TENDER JOINTS: 2

## 2023-12-04 NOTE — PROGRESS NOTES
Michelle Montero M.D.  [address] [place] [address]., [facility], [address] [place] [address]  Office : [phone], Fax: [phone] OFFICE VISIT NOTE  Date of Visit:  2023 11:25 AM    Patient Information:  Name:  Phillip Aquino  :  1950  Age:  74 y.o. Gender:  female      Ms. Anila Rivera is here today for follow-up of systemic lupus erythematosus, raynaud's and medication monitoring. Last visit: 8/3/2023     History of Present Illness: On reviewing patient's records it does appear that her last eye exam from 1/3/23 did not show any evidence of Plaquenil toxicity. On talking to the patient today she states that she has had a total hip joint replacement on 8/10/2023 and is feeling so much better. On talking to her further she states that she did have a fall 5 days after her replacement surgery and injured her left knee with a carpet burn when she landed on her left knee and her left elbow but the left elbow and forehead did not bother her after the accident. Patient's current joint complaints are as mentioned below. Since the last visit, patient is feeling \"very good\". Pain: 2/10  Location: Some lower back pain. Bilateral wrist pain with no swelling, warmth and redness. Bilateral ankle swelling with no warmth and redness. No buckling of the ankles. Quality:  Deep achy pain. Modifying Factors:  Leaning over worsens the lower back pain. Associated Symptoms:  No tingling, numbness or pain down the arms or legs. No UE or LE weakness. Has a good  with no difficulty opening jars or buttoning and unbuttoning.           2023     9:00 AM   DMARD/Biologic   AM Stiffness < 10 min   Pain 2   Fatigue 5   MDHAQ 0.6   Patient Global Score 2   Medication Name Plaquenil   Medication Name Other   Other Medication Diclofenac     Last TB screen:    TB result: Skin (+); X-Ray (-)       Current dose of steroids: None  How long on current dose of

## 2023-12-05 LAB — DSDNA AB SER-ACNC: 5 IU/ML (ref 0–9)

## 2023-12-07 LAB
C3 SERPL-MCNC: 206 MG/DL (ref 82–167)
C4 SERPL-MCNC: 33 MG/DL (ref 12–38)

## 2024-01-01 NOTE — PERIOP NOTE
The following records have been requested from [facility]- Dr. Lowell Moreno    Please send EKG 2023 via fax to [phone]. Pt having joint replacement and EKG needed for anesthesia reference. Pt name: Liborio West  : 1950    Thank you! 35

## 2024-02-14 ENCOUNTER — OFFICE VISIT (OUTPATIENT)
Dept: ORTHOPEDIC SURGERY | Age: 74
End: 2024-02-14

## 2024-02-14 DIAGNOSIS — Z96.642 H/O TOTAL HIP ARTHROPLASTY, LEFT: ICD-10-CM

## 2024-02-14 DIAGNOSIS — Z09 SURGERY FOLLOW-UP: Primary | ICD-10-CM

## 2024-02-14 NOTE — PROGRESS NOTES
Name: Ileana Unger  YOB: 1950  Gender: female  MRN: [id number]    Post-Op LEFT MICHAEL: 6 months    HPI: This patient returns now six months out from surgery.  The patient is happy with their return to function.  They are experiencing minimal discomfort.  They have weaned away from the cane.  Interval medical change is noted on the patient history form which is updated today.    Past Medical History:    Allergies:  Allergies   Allergen Reactions    Sulfa Antibiotics Other (See Comments)     Visual dusturbances    Penicillins Rash       Medications:  Current Outpatient Medications   Medication Sig    hydroxychloroquine (PLAQUENIL) 200 MG tablet Take 1 tablet by mouth daily after food    diclofenac (VOLTAREN) 75 MG EC tablet Take 1 tablet by mouth 2 times daily    aspirin 81 MG EC tablet Take 1 tablet by mouth 2 times daily    famotidine (PEPCID) 20 MG tablet Take 1 tablet by mouth Daily with supper    DULoxetine (CYMBALTA) 60 MG extended release capsule Take 1 capsule by mouth 2 times daily    triamcinolone (KENALOG) 0.1 % cream Apply 1 g topically 2 times daily as needed (skin irritation)    Cyanocobalamin (VITAMIN B 12 PO) Take 1,000 mcg by mouth daily    Glucosamine-Chondroitin (GLUCOSAMINE CHONDR COMPLEX PO) Take 2 capsules by mouth in the morning and at bedtime    Coenzyme Q10 (CO Q 10) 10 MG CAPS Take 1 capsule by mouth daily    Calcium Carbonate-Vitamin D (CALTRATE 600+D PO) Take 1 tablet by mouth daily    acetaminophen (TYLENOL) 500 MG tablet Take 2 tablets by mouth every 6 hours as needed for Pain (breakthrough)    acyclovir (ZOVIRAX) 400 MG tablet Take 1 tablet by mouth as needed (fever blisters)    alclomethasone (ACLOVATE) 0.05 % cream Apply 1 g topically 2 times daily as needed (allergies)    amLODIPine (NORVASC) 10 MG tablet Take 1 tablet by mouth daily    amphetamine-dextroamphetamine (ADDERALL) 30 MG tablet Take 1 tablet by mouth daily.    hydrocortisone 1 % cream Apply topically 2

## 2024-03-06 ENCOUNTER — OFFICE VISIT (OUTPATIENT)
Dept: RHEUMATOLOGY | Age: 74
End: 2024-03-06
Payer: MEDICARE

## 2024-03-06 VITALS
HEART RATE: 92 BPM | BODY MASS INDEX: 24.75 KG/M2 | WEIGHT: 154 LBS | DIASTOLIC BLOOD PRESSURE: 83 MMHG | HEIGHT: 66 IN | SYSTOLIC BLOOD PRESSURE: 142 MMHG

## 2024-03-06 DIAGNOSIS — I73.00 RAYNAUD'S DISEASE WITHOUT GANGRENE: ICD-10-CM

## 2024-03-06 DIAGNOSIS — M32.9 SYSTEMIC LUPUS ERYTHEMATOSUS, UNSPECIFIED SLE TYPE, UNSPECIFIED ORGAN INVOLVEMENT STATUS (HCC): Primary | ICD-10-CM

## 2024-03-06 DIAGNOSIS — Z79.899 LONG-TERM USE OF HIGH-RISK MEDICATION: ICD-10-CM

## 2024-03-06 DIAGNOSIS — M32.9 SYSTEMIC LUPUS ERYTHEMATOSUS, UNSPECIFIED SLE TYPE, UNSPECIFIED ORGAN INVOLVEMENT STATUS (HCC): ICD-10-CM

## 2024-03-06 PROCEDURE — 99214 OFFICE O/P EST MOD 30 MIN: CPT | Performed by: INTERNAL MEDICINE

## 2024-03-06 PROCEDURE — 1123F ACP DISCUSS/DSCN MKR DOCD: CPT | Performed by: INTERNAL MEDICINE

## 2024-03-06 PROCEDURE — 3079F DIAST BP 80-89 MM HG: CPT | Performed by: INTERNAL MEDICINE

## 2024-03-06 PROCEDURE — 3077F SYST BP >= 140 MM HG: CPT | Performed by: INTERNAL MEDICINE

## 2024-03-06 RX ORDER — HYDROXYCHLOROQUINE SULFATE 200 MG/1
200 TABLET, FILM COATED ORAL DAILY
Qty: 90 TABLET | Refills: 1 | Status: SHIPPED | OUTPATIENT
Start: 2024-03-06

## 2024-03-06 RX ORDER — DEXTROAMPHETAMINE SACCHARATE, AMPHETAMINE ASPARTATE MONOHYDRATE, DEXTROAMPHETAMINE SULFATE AND AMPHETAMINE SULFATE 7.5; 7.5; 7.5; 7.5 MG/1; MG/1; MG/1; MG/1
30 CAPSULE, EXTENDED RELEASE ORAL EVERY MORNING
COMMUNITY

## 2024-03-06 ASSESSMENT — ROUTINE ASSESSMENT OF PATIENT INDEX DATA (RAPID3)
ON A SCALE OF ONE TO TEN, HOW MUCH PAIN HAVE YOU HAD BECAUSE OF YOUR CONDITION OVER THE PAST WEEK?: 1
ON A SCALE OF ONE TO TEN, HOW MUCH OF A PROBLEM HAS UNUSUAL FATIGUE OR TIREDNESS BEEN FOR YOU OVER THE PAST WEEK?: 7
ON A SCALE OF ONE TO TEN, HOW DIFFICULT WAS IT FOR YOU TO COMPLETE THE LISTED DAILY PHYSICAL TASKS OVER THE LAST WEEK: 0.4
WHEN YOU AWAKENED IN THE MORNING OVER THE LAST WEEK, PLEASE INDICATE THE AMOUNT OF TIME IT TAKES UNTIL YOU ARE AS LIMBER AS YOU WILL BE FOR THE DAY: < 10 MIN
ON A SCALE OF ONE TO TEN, CONSIDERING ALL THE WAYS IN WHICH ILLNESS AND HEALTH CONDITIONS MAY AFFECT YOU AT THIS TIME, PLEASE INDICATE BELOW HOW YOU ARE DOING:: 1

## 2024-03-06 ASSESSMENT — JOINT PAIN
TOTAL NUMBER OF TENDER JOINTS: 0
TOTAL NUMBER OF SWOLLEN JOINTS: 0

## 2024-03-06 NOTE — PROGRESS NOTES
11.9 - 14.6 % Final    Platelets 12/04/2023 179  150 - 450 K/uL Final    MPV 12/04/2023 10.9  9.4 - 12.3 FL Final    nRBC 12/04/2023 0.00  0.0 - 0.2 K/uL Final    **Note: Absolute NRBC parameter is now reported with Hemogram**    Differential Type 12/04/2023 AUTOMATED    Final    Neutrophils % 12/04/2023 63  43 - 78 % Final    Lymphocytes % 12/04/2023 18  13 - 44 % Final    Monocytes % 12/04/2023 14 (H)  4.0 - 12.0 % Final    Eosinophils % 12/04/2023 4  0.5 - 7.8 % Final    Basophils % 12/04/2023 1  0.0 - 2.0 % Final    Immature Granulocytes 12/04/2023 0  0.0 - 5.0 % Final    Neutrophils Absolute 12/04/2023 2.3  1.7 - 8.2 K/UL Final    Lymphocytes Absolute 12/04/2023 0.7  0.5 - 4.6 K/UL Final    Monocytes Absolute 12/04/2023 0.5  0.1 - 1.3 K/UL Final    Eosinophils Absolute 12/04/2023 0.2  0.0 - 0.8 K/UL Final    Basophils Absolute 12/04/2023 0.0  0.0 - 0.2 K/UL Final    Absolute Immature Granulocyte 12/04/2023 0.0  0.0 - 0.5 K/UL Final    C3 Complement 12/04/2023 206 (H)  82 - 167 mg/dL Final    Comment: (NOTE)  Performed At: [address] [id number]  Franky Rasmussen MD Ph:[phone]      dsDNA Ab 12/04/2023 5  0 - 9 IU/mL Final    Comment: (NOTE)                                    Negative      <5                                    Equivocal  5 - 9                                    Positive      >9  Performed At: [address] [id number]  Franky Rasmussen MD Ph:[phone]      C4 Complement 12/04/2023 33  12 - 38 mg/dL Final    Comment: (NOTE)  Performed At: [address] [id number]  Franky Rasmussen MD Ph:[phone]       The results above were reviewed and discussed with patient.       Assessment/Plan:   Ileana Unger is a 73 y.o. female who presents with:     Systemic lupus erythematosus, unspecified SLE type, unspecified organ involvement status (HCC): Patient was instructed to continue Plaquenil

## 2024-03-07 LAB
C3 SERPL-MCNC: 134 MG/DL (ref 82–167)
C4 SERPL-MCNC: 23 MG/DL (ref 12–38)
DSDNA AB SER-ACNC: 4 IU/ML (ref 0–9)

## 2024-04-25 NOTE — RT PROTOCOL NOTE
15 second breath hold. Patient may be changed to self-administer as appropriate. Patients in isolation will be provided an individual inhaler. Unassisted aerosol (UA) is indicated if  Ventilation is inadequate  Patient is unable to perform MDI appropriately     VII. Guidelines:   Monitor patient's vital signs and evaluate patient's clinical status. The need to change medication and/or modality may be indicated by:  A pulse greater than 120 bpm, or if a pulse increase of 20 bpm occurs with bronchodilator medications. Significant worsening of dyspnea or wheezing occurring during or within 30 minutes of discontinuing therapy. Worsening of patient's sensorium (e.g. patient becomes confused or obtunded, and unable to follow directions). Worsening of patient's chest x-ray. Change in sputum (e.g. increased pulmonary infiltrate, which might indicate need for volume expansion therapy). Patient has difficulty coughing up secretions, which might indicate need for acetylcysteine and/or bronchial hygiene therapy. Call physician immediately if dyspnea worsens and is not responsive to modifications allowed by protocol. VIII. Clinical Responsibility:  The therapy assessment guidelines will be used to evaluate all patients receiving aerosolized medications with the exception of critical care areas. RCP's will perform changes in therapy per protocol. It will be the responsibility of RCP to provide instruction regarding respiratory medications, possible side effects, aerosol therapy and proper MDI technique, as well as, spacer usage to patients ordered MDI therapy. Current therapy that is part of a patient's home regimen will not be discontinued. Provide spacer and educate patient on proper inhaler technique if needed. IX. Documentation  Document assessment findings in the respiratory assessment section of the patient's EMR.   Document changes in therapy per protocol in the respiratory orders section and in the

## 2024-05-15 NOTE — PROGRESS NOTES
[facility]  [address]  [place] [address]  [phone]    Patient Name:  Ileana Unger  YOB: 1950      Office Visit 5/16/2024    CHIEF COMPLAINT:    Chief Complaint   Patient presents with    New Patient       HISTORY OF PRESENT ILLNESS:      The patient presents in outpatient consultation at the request of Dr. Atilio Henderson for management of obstructive sleep apnea.  Chronic medical problems include mitral valve prolapse, palpitations, hypertension, Raynaud's, interstitial lung disease, sleep apnea, systemic lupus erythematosus, ADD, hypercholesterolemia, osteoarthritis.    Patient was diagnosed with sleep apnea 15+ years ago at [facility]. She had a study due to hypersomnia and nocturnal gasping. She was placed on cpap therapy and these symptoms resolved.    She remains on CPAP 10 cm using a nasal mask. Her machine is Resmed S8. Download reveals an AHI of 4.3/hr with leak of 1.12. Download reports she is only using pap therapy 3.29 hours but she is using it 7-8 hours/night. The machine is very old and needs to be replaced.     She presents today because she needs a new machine. She also has noticed more fatigue recently. She is taking Adderall 30 mg XR prescribed by PCP for ADD.  Her PCP wanted to ensure her apnea was controlled. Patient's  has noticed that she is snoring again on cpap therapy. She uses it every night and even uses it if she naps.   She will try to get sleep studies from [facility].            Sleepiness Scale:       5/16/2024     2:44 PM   Sleep Medicine   Sitting and reading 3   Watching TV 3   Sitting, inactive in a public place (e.g. a theatre or a meeting) 0   As a passenger in a car for an hour without a break 3   Lying down to rest in the afternoon when circumstances permit 3   Sitting and talking to someone 0   Sitting quietly after a lunch without alcohol 3   In a car, while stopped for a few minutes in traffic 0

## 2024-05-16 ENCOUNTER — OFFICE VISIT (OUTPATIENT)
Dept: SLEEP MEDICINE | Age: 74
End: 2024-05-16

## 2024-05-16 VITALS
SYSTOLIC BLOOD PRESSURE: 116 MMHG | HEIGHT: 66 IN | BODY MASS INDEX: 25.23 KG/M2 | DIASTOLIC BLOOD PRESSURE: 67 MMHG | TEMPERATURE: 97.6 F | HEART RATE: 105 BPM | WEIGHT: 157 LBS | RESPIRATION RATE: 19 BRPM | OXYGEN SATURATION: 95 %

## 2024-05-16 DIAGNOSIS — G47.10 HYPERSOMNIA: ICD-10-CM

## 2024-05-16 DIAGNOSIS — G47.34 NOCTURNAL HYPOXEMIA: ICD-10-CM

## 2024-05-16 DIAGNOSIS — G47.33 OSA (OBSTRUCTIVE SLEEP APNEA): Primary | ICD-10-CM

## 2024-05-16 RX ORDER — LOSARTAN POTASSIUM 100 MG/1
TABLET ORAL
COMMUNITY
Start: 2024-04-26

## 2024-05-16 ASSESSMENT — SLEEP AND FATIGUE QUESTIONNAIRES
HOW LIKELY ARE YOU TO NOD OFF OR FALL ASLEEP WHILE SITTING QUIETLY AFTER LUNCH WITHOUT ALCOHOL: HIGH CHANCE OF DOZING
HOW LIKELY ARE YOU TO NOD OFF OR FALL ASLEEP WHILE SITTING AND TALKING TO SOMEONE: WOULD NEVER DOZE
HOW LIKELY ARE YOU TO NOD OFF OR FALL ASLEEP WHILE LYING DOWN TO REST IN THE AFTERNOON WHEN CIRCUMSTANCES PERMIT: HIGH CHANCE OF DOZING
HOW LIKELY ARE YOU TO NOD OFF OR FALL ASLEEP WHILE SITTING INACTIVE IN A PUBLIC PLACE: WOULD NEVER DOZE
HOW LIKELY ARE YOU TO NOD OFF OR FALL ASLEEP WHEN YOU ARE A PASSENGER IN A CAR FOR AN HOUR WITHOUT A BREAK: HIGH CHANCE OF DOZING
HOW LIKELY ARE YOU TO NOD OFF OR FALL ASLEEP IN A CAR, WHILE STOPPED FOR A FEW MINUTES IN TRAFFIC: WOULD NEVER DOZE
HOW LIKELY ARE YOU TO NOD OFF OR FALL ASLEEP WHILE WATCHING TV: HIGH CHANCE OF DOZING
ESS TOTAL SCORE: 15

## 2024-05-16 NOTE — PATIENT INSTRUCTIONS
New machine will be ordered set to CPAP 10 cm using a nasal mask.  Order will be sent to [employer].  Try to obtain copies of study from [facility]. Paper provided.    Follow up will be in 4 months or sooner if needed      The company who will be taking care of your CPAP supplies is:    [facility] [id number] [id number]  Phone: [phone] Option #1  Fax: [phone]

## 2024-06-10 ENCOUNTER — CLINICAL DOCUMENTATION (OUTPATIENT)
Dept: SLEEP MEDICINE | Age: 74
End: 2024-06-10

## 2024-06-10 NOTE — PROGRESS NOTES
Sleep study records received from [facility]/ Dr. Tres Holloway. Disc given to sleep clinic on 6/10/24.

## 2024-06-17 ENCOUNTER — TELEPHONE (OUTPATIENT)
Dept: SLEEP MEDICINE | Age: 74
End: 2024-06-17

## 2024-06-17 NOTE — TELEPHONE ENCOUNTER
----- Message from Ileana Unger sent at 6/14/2024 11:14 AM EDT -----  Regarding: New Cpap machine  Contact: [phone]  I was in for my first visit with Baylee Villanueva 5-16-24 to inquire about getting a new Cpap machine. The one I have is old & I don’t think it is working properly, as I have daytime sleepiness & fatigue even after 7-8 hours sleep. You were to get the records from Dr Tres Holloway & I see on [employer] they have been sent just this week. Do you know how much longer before I hear something? The girl from the supply location called the day after my visit & said when my insurance approved the purchase, I would be notified. Are they waiting on more info from you with the records from Dr Holloway to get this completed? Just trying to get an idea of when I may hear something.  Thank you!! You can leave me the info on the message in [employer] or call me at [phone]. Thank you

## 2024-07-02 ENCOUNTER — OFFICE VISIT (OUTPATIENT)
Dept: RHEUMATOLOGY | Age: 74
End: 2024-07-02
Payer: MEDICARE

## 2024-07-02 VITALS
BODY MASS INDEX: 25.23 KG/M2 | WEIGHT: 157 LBS | HEIGHT: 66 IN | SYSTOLIC BLOOD PRESSURE: 145 MMHG | DIASTOLIC BLOOD PRESSURE: 94 MMHG | HEART RATE: 87 BPM

## 2024-07-02 DIAGNOSIS — M32.9 SYSTEMIC LUPUS ERYTHEMATOSUS, UNSPECIFIED SLE TYPE, UNSPECIFIED ORGAN INVOLVEMENT STATUS (HCC): Primary | ICD-10-CM

## 2024-07-02 DIAGNOSIS — Z79.899 LONG-TERM USE OF HIGH-RISK MEDICATION: ICD-10-CM

## 2024-07-02 DIAGNOSIS — I73.00 RAYNAUD'S DISEASE WITHOUT GANGRENE: ICD-10-CM

## 2024-07-02 DIAGNOSIS — M32.9 SYSTEMIC LUPUS ERYTHEMATOSUS, UNSPECIFIED SLE TYPE, UNSPECIFIED ORGAN INVOLVEMENT STATUS (HCC): ICD-10-CM

## 2024-07-02 LAB
ALBUMIN SERPL-MCNC: 4 G/DL (ref 3.2–4.6)
ALBUMIN/GLOB SERPL: 1.4 (ref 1–1.9)
ALP SERPL-CCNC: 109 U/L (ref 35–104)
ALT SERPL-CCNC: 18 U/L (ref 12–65)
ANION GAP SERPL CALC-SCNC: 9 MMOL/L (ref 9–18)
APPEARANCE UR: CLEAR
AST SERPL-CCNC: 28 U/L (ref 15–37)
BACTERIA URNS QL MICRO: NEGATIVE /HPF
BASOPHILS # BLD: 0 K/UL (ref 0–0.2)
BASOPHILS NFR BLD: 1 % (ref 0–2)
BILIRUB SERPL-MCNC: 0.5 MG/DL (ref 0–1.2)
BILIRUB UR QL: NEGATIVE
BUN SERPL-MCNC: 17 MG/DL (ref 8–23)
CALCIUM SERPL-MCNC: 9.6 MG/DL (ref 8.8–10.2)
CHLORIDE SERPL-SCNC: 108 MMOL/L (ref 98–107)
CO2 SERPL-SCNC: 27 MMOL/L (ref 20–28)
COLOR UR: ABNORMAL
CREAT SERPL-MCNC: 0.76 MG/DL (ref 0.6–1.1)
DIFFERENTIAL METHOD BLD: ABNORMAL
EOSINOPHIL # BLD: 0.3 K/UL (ref 0–0.8)
EOSINOPHIL NFR BLD: 5 % (ref 0.5–7.8)
EPI CELLS #/AREA URNS HPF: ABNORMAL /HPF (ref 0–5)
ERYTHROCYTE [DISTWIDTH] IN BLOOD BY AUTOMATED COUNT: 12.6 % (ref 11.9–14.6)
GLOBULIN SER CALC-MCNC: 2.8 G/DL (ref 2.3–3.5)
GLUCOSE SERPL-MCNC: 95 MG/DL (ref 70–99)
GLUCOSE UR STRIP.AUTO-MCNC: NEGATIVE MG/DL
HCT VFR BLD AUTO: 40.2 % (ref 35.8–46.3)
HGB BLD-MCNC: 12.4 G/DL (ref 11.7–15.4)
HGB UR QL STRIP: NEGATIVE
HYALINE CASTS URNS QL MICRO: ABNORMAL /LPF
IMM GRANULOCYTES # BLD AUTO: 0 K/UL (ref 0–0.5)
IMM GRANULOCYTES NFR BLD AUTO: 0 % (ref 0–5)
KETONES UR QL STRIP.AUTO: ABNORMAL MG/DL
LEUKOCYTE ESTERASE UR QL STRIP.AUTO: NEGATIVE
LYMPHOCYTES # BLD: 1 K/UL (ref 0.5–4.6)
LYMPHOCYTES NFR BLD: 18 % (ref 13–44)
MCH RBC QN AUTO: 29.6 PG (ref 26.1–32.9)
MCHC RBC AUTO-ENTMCNC: 30.8 G/DL (ref 31.4–35)
MCV RBC AUTO: 95.9 FL (ref 82–102)
MONOCYTES # BLD: 0.6 K/UL (ref 0.1–1.3)
MONOCYTES NFR BLD: 11 % (ref 4–12)
MUCOUS THREADS URNS QL MICRO: 0 /LPF
NEUTS SEG # BLD: 3.5 K/UL (ref 1.7–8.2)
NEUTS SEG NFR BLD: 65 % (ref 43–78)
NITRITE UR QL STRIP.AUTO: NEGATIVE
NRBC # BLD: 0 K/UL (ref 0–0.2)
PH UR STRIP: 5 (ref 5–9)
PLATELET # BLD AUTO: 184 K/UL (ref 150–450)
PMV BLD AUTO: 11.9 FL (ref 9.4–12.3)
POTASSIUM SERPL-SCNC: 4.6 MMOL/L (ref 3.5–5.1)
PROT SERPL-MCNC: 6.8 G/DL (ref 6.3–8.2)
PROT UR STRIP-MCNC: NEGATIVE MG/DL
RBC # BLD AUTO: 4.19 M/UL (ref 4.05–5.2)
RBC #/AREA URNS HPF: ABNORMAL /HPF (ref 0–5)
SODIUM SERPL-SCNC: 144 MMOL/L (ref 136–145)
SP GR UR REFRACTOMETRY: 1.03 (ref 1–1.02)
URINE CULTURE IF INDICATED: ABNORMAL
UROBILINOGEN UR QL STRIP.AUTO: 0.2 EU/DL (ref 0.2–1)
WBC # BLD AUTO: 5.3 K/UL (ref 4.3–11.1)
WBC URNS QL MICRO: ABNORMAL /HPF (ref 0–4)

## 2024-07-02 PROCEDURE — 1123F ACP DISCUSS/DSCN MKR DOCD: CPT | Performed by: INTERNAL MEDICINE

## 2024-07-02 PROCEDURE — 99214 OFFICE O/P EST MOD 30 MIN: CPT | Performed by: INTERNAL MEDICINE

## 2024-07-02 PROCEDURE — 3077F SYST BP >= 140 MM HG: CPT | Performed by: INTERNAL MEDICINE

## 2024-07-02 PROCEDURE — G2211 COMPLEX E/M VISIT ADD ON: HCPCS | Performed by: INTERNAL MEDICINE

## 2024-07-02 PROCEDURE — 3080F DIAST BP >= 90 MM HG: CPT | Performed by: INTERNAL MEDICINE

## 2024-07-02 RX ORDER — AMLODIPINE BESYLATE 5 MG/1
5 TABLET ORAL DAILY
COMMUNITY
Start: 2024-02-01

## 2024-07-02 RX ORDER — HYDROXYCHLOROQUINE SULFATE 200 MG/1
200 TABLET, FILM COATED ORAL DAILY
Qty: 90 TABLET | Refills: 1 | Status: SHIPPED | OUTPATIENT
Start: 2024-07-02

## 2024-07-02 ASSESSMENT — ROUTINE ASSESSMENT OF PATIENT INDEX DATA (RAPID3)
WHEN YOU AWAKENED IN THE MORNING OVER THE LAST WEEK, PLEASE INDICATE THE AMOUNT OF TIME IT TAKES UNTIL YOU ARE AS LIMBER AS YOU WILL BE FOR THE DAY: < 10 MIN
ON A SCALE OF ONE TO TEN, HOW MUCH OF A PROBLEM HAS UNUSUAL FATIGUE OR TIREDNESS BEEN FOR YOU OVER THE PAST WEEK?: 3
ON A SCALE OF ONE TO TEN, HOW MUCH PAIN HAVE YOU HAD BECAUSE OF YOUR CONDITION OVER THE PAST WEEK?: 1
ON A SCALE OF ONE TO TEN, CONSIDERING ALL THE WAYS IN WHICH ILLNESS AND HEALTH CONDITIONS MAY AFFECT YOU AT THIS TIME, PLEASE INDICATE BELOW HOW YOU ARE DOING:: 2
ON A SCALE OF ONE TO TEN, HOW DIFFICULT WAS IT FOR YOU TO COMPLETE THE LISTED DAILY PHYSICAL TASKS OVER THE LAST WEEK: 0.5

## 2024-07-02 ASSESSMENT — JOINT PAIN
TOTAL NUMBER OF SWOLLEN JOINTS: 0
TOTAL NUMBER OF TENDER JOINTS: 2

## 2024-07-02 NOTE — PROGRESS NOTES
[facility]  Apolonia Flores M.D.  [address] , [address]   [facility]  Office : [phone], Fax: [phone]     RHEUMATOLOGY OFFICE VISIT NOTE  Date of Visit:  2024 12:18 PM    Patient Information:  Name:  Ileana Unger  :  1950  Age:  73 y.o.   Gender:  female      Ms. Unger is here today for follow-up of systemic lupus erythematosus, raynaud's and medication monitoring.     Last visit: 3/6/24    History of Present Illness: On talking to the patient today she states that she has not had any cough, congestion, fever or chills secondary to seasonal allergies.  Patient's last eye exam from 24 at [facility], did not show any evidence of Plaquenil toxicity.  With regard to her elevated blood pressure in the office today she states that her B.P at home runs in the range of 127/70-80.  Since she last saw me she has not had a flare of her underlying joint condition.  Her current joint complaints are as mentioned below.    Since the last visit, patient is feeling \"very good\".    Pain: 1/10  Location:  Some left worse than the right CMC joint pain with no swelling with no warmth and redness. No knee pain with some swelling of the right knee with no warmth and redness. No buckling of the right knee. Lateral hip pain with no groin pain. Some lower back pain.   Quality:  Throbbing pain.   Modifying Factors:  Movement eases the stiffness and pain.   Associated Symptoms:  No limitations with her ADL's. No tingling, numbness or pain down the arms or legs. Bilateral LE weakness.         2024     9:00 AM   DMARD/Biologic   AM Stiffness < 10 min   Pain 1   Fatigue 3   MDHAQ 0.5   Patient Global Score 2   Medication Name Plaquenil     Last TB screen:    TB result: Skin (+); X-Ray (-)       Current dose of steroids: None  How long on current dose of steroids: NA  How long on continuous steroid therapy: NA       Past DMARDs, if applicable (methotrexate,

## 2024-07-04 LAB
C3 SERPL-MCNC: 141 MG/DL (ref 82–167)
C4 SERPL-MCNC: 22 MG/DL (ref 12–38)
DSDNA AB SER-ACNC: 5 IU/ML (ref 0–9)

## 2024-08-14 ENCOUNTER — OFFICE VISIT (OUTPATIENT)
Dept: ORTHOPEDIC SURGERY | Age: 74
End: 2024-08-14
Payer: MEDICARE

## 2024-08-14 VITALS — BODY MASS INDEX: 25.23 KG/M2 | HEIGHT: 66 IN | WEIGHT: 157 LBS

## 2024-08-14 DIAGNOSIS — Z09 FOLLOW-UP EXAMINATION: ICD-10-CM

## 2024-08-14 DIAGNOSIS — Z96.642 H/O TOTAL HIP ARTHROPLASTY, LEFT: Primary | ICD-10-CM

## 2024-08-14 PROCEDURE — 99213 OFFICE O/P EST LOW 20 MIN: CPT | Performed by: PHYSICIAN ASSISTANT

## 2024-08-14 PROCEDURE — 1123F ACP DISCUSS/DSCN MKR DOCD: CPT | Performed by: PHYSICIAN ASSISTANT

## 2024-08-14 NOTE — PROGRESS NOTES
MICHAEL.    CLINICAL IMPRESSION AND PLAN:  Stable LEFT MICHAEL.  I have made activity recommendations to the patient.  I have discussed indications for further follow-up with the patient..  Given their current level of function and current result I do not think they need routine follow-up.  However, if they have a change in their function or concerns we will be happy to see them as needed.    Work/Activity Restrictions: NOT APPLICABLE    SUDHA Quan

## 2024-08-20 ENCOUNTER — TRANSCRIBE ORDERS (OUTPATIENT)
Dept: SCHEDULING | Age: 74
End: 2024-08-20

## 2024-08-20 DIAGNOSIS — Z78.0 OSTEOPENIA AFTER MENOPAUSE: Primary | ICD-10-CM

## 2024-08-20 DIAGNOSIS — M85.80 OSTEOPENIA AFTER MENOPAUSE: Primary | ICD-10-CM

## 2024-10-04 ENCOUNTER — HOSPITAL ENCOUNTER (OUTPATIENT)
Dept: MAMMOGRAPHY | Age: 74
Discharge: HOME | End: 2024-10-07
Payer: MEDICARE

## 2024-10-04 DIAGNOSIS — M85.80 OSTEOPENIA AFTER MENOPAUSE: ICD-10-CM

## 2024-10-04 DIAGNOSIS — Z12.31 ENCOUNTER FOR SCREENING MAMMOGRAM FOR MALIGNANT NEOPLASM OF BREAST: ICD-10-CM

## 2024-10-04 DIAGNOSIS — Z78.0 OSTEOPENIA AFTER MENOPAUSE: ICD-10-CM

## 2024-10-04 PROCEDURE — 77080 DXA BONE DENSITY AXIAL: CPT

## 2024-10-04 PROCEDURE — 77063 BREAST TOMOSYNTHESIS BI: CPT

## 2024-10-07 NOTE — PROGRESS NOTES
facility-administered medications for this visit.             REVIEW OF SYSTEMS:   CONSTITUTIONAL:   There is no history of fever, chills, night sweats.   +weight loss    CARDIAC:   No chest pain, pressure, discomfort, palpitations, orthopnea, murmurs, or edema.   GI:   No dysphagia, heartburn reflux, nausea/vomiting, diarrhea, abdominal pain, or bleeding.   NEURO:   There is no history of AMS, persistent headache, decreased level of consciousness, seizures, or motor or sensory deficits.    VIRTUAL EXAM  PHYSICAL EXAMINATION:  [ INSTRUCTIONS:  \"[x]\" Indicates a positive item  \"[]\" Indicates a negative item   Vital Signs: (As obtained by patient/caregiver at home)  There were no vitals taken for this visit.     Constitutional: [x] Appears well-developed and well-nourished [x] No apparent distress      [] Abnormal     Mental status: [x] Alert and awake  [x] Oriented to person/place/time [x] Able to follow commands    [] Abnormal -     Eyes:   EOM    [x]  Normal    [] Abnormal -   Sclera  [x]  Normal    [] Abnormal -          Discharge [x]  None visible   [] Abnormal -    HENT: [x] Normocephalic, atraumatic  [] Abnormal -  [x] Mouth/Throat: Mucous membranes are moist    External Ears [x] Normal  [] Abnormal -    Neck: [x] No visualized mass [] Abnormal -     Pulmonary/Chest: [x] Respiratory effort normal   [x] No visualized signs of difficulty breathing or respiratory distress        [] Abnormal -      Musculoskeletal:   [x] Normal gait with no signs of ataxia         [x] Normal range of motion of neck        [] Abnormal -     Neurological:        [x] No Facial Asymmetry (Cranial nerve 7 motor function) (limited exam due to video visit)          [x] No gaze palsy        [] Abnormal -         Skin:        [x] No significant exanthematous lesions or discoloration noted on facial skin         [] Abnormal -            Psychiatric:       [x] Normal Affect [] Abnormal -       [x] No Hallucinations    Other pertinent

## 2024-10-08 ENCOUNTER — TELEMEDICINE (OUTPATIENT)
Dept: SLEEP MEDICINE | Age: 74
End: 2024-10-08
Payer: MEDICARE

## 2024-10-08 DIAGNOSIS — G47.33 OSA (OBSTRUCTIVE SLEEP APNEA): Primary | ICD-10-CM

## 2024-10-08 DIAGNOSIS — G47.34 NOCTURNAL HYPOXEMIA: ICD-10-CM

## 2024-10-08 DIAGNOSIS — G47.10 HYPERSOMNIA: ICD-10-CM

## 2024-10-08 PROCEDURE — 99213 OFFICE O/P EST LOW 20 MIN: CPT | Performed by: NURSE PRACTITIONER

## 2024-10-08 PROCEDURE — 1123F ACP DISCUSS/DSCN MKR DOCD: CPT | Performed by: NURSE PRACTITIONER

## 2024-10-08 ASSESSMENT — SLEEP AND FATIGUE QUESTIONNAIRES
HOW LIKELY ARE YOU TO NOD OFF OR FALL ASLEEP WHILE SITTING QUIETLY AFTER LUNCH WITHOUT ALCOHOL: SLIGHT CHANCE OF DOZING
HOW LIKELY ARE YOU TO NOD OFF OR FALL ASLEEP WHILE SITTING INACTIVE IN A PUBLIC PLACE: WOULD NEVER DOZE
ESS TOTAL SCORE: 8
HOW LIKELY ARE YOU TO NOD OFF OR FALL ASLEEP WHILE SITTING AND READING: MODERATE CHANCE OF DOZING
HOW LIKELY ARE YOU TO NOD OFF OR FALL ASLEEP WHEN YOU ARE A PASSENGER IN A CAR FOR AN HOUR WITHOUT A BREAK: WOULD NEVER DOZE
HOW LIKELY ARE YOU TO NOD OFF OR FALL ASLEEP WHILE LYING DOWN TO REST IN THE AFTERNOON WHEN CIRCUMSTANCES PERMIT: HIGH CHANCE OF DOZING
HOW LIKELY ARE YOU TO NOD OFF OR FALL ASLEEP WHILE WATCHING TV: MODERATE CHANCE OF DOZING
HOW LIKELY ARE YOU TO NOD OFF OR FALL ASLEEP IN A CAR, WHILE STOPPED FOR A FEW MINUTES IN TRAFFIC: WOULD NEVER DOZE
HOW LIKELY ARE YOU TO NOD OFF OR FALL ASLEEP WHILE SITTING AND TALKING TO SOMEONE: WOULD NEVER DOZE

## 2024-11-14 RX ORDER — HYDROXYCHLOROQUINE SULFATE 200 MG/1
TABLET, FILM COATED ORAL
Qty: 90 TABLET | Refills: 1 | OUTPATIENT
Start: 2024-11-14

## 2024-12-09 ENCOUNTER — OFFICE VISIT (OUTPATIENT)
Dept: RHEUMATOLOGY | Age: 74
End: 2024-12-09
Payer: MEDICARE

## 2024-12-09 VITALS
DIASTOLIC BLOOD PRESSURE: 81 MMHG | HEART RATE: 95 BPM | WEIGHT: 158.2 LBS | BODY MASS INDEX: 25.43 KG/M2 | SYSTOLIC BLOOD PRESSURE: 133 MMHG | HEIGHT: 66 IN

## 2024-12-09 DIAGNOSIS — M32.9 SYSTEMIC LUPUS ERYTHEMATOSUS, UNSPECIFIED SLE TYPE, UNSPECIFIED ORGAN INVOLVEMENT STATUS (HCC): Primary | ICD-10-CM

## 2024-12-09 DIAGNOSIS — Z79.899 LONG-TERM USE OF HIGH-RISK MEDICATION: ICD-10-CM

## 2024-12-09 DIAGNOSIS — I73.00 RAYNAUD'S DISEASE WITHOUT GANGRENE: ICD-10-CM

## 2024-12-09 LAB
BASOPHILS # BLD: 0 K/UL (ref 0–0.2)
BASOPHILS NFR BLD: 1 % (ref 0–2)
DIFFERENTIAL METHOD BLD: ABNORMAL
EOSINOPHIL # BLD: 0.2 K/UL (ref 0–0.8)
EOSINOPHIL NFR BLD: 4 % (ref 0.5–7.8)
ERYTHROCYTE [DISTWIDTH] IN BLOOD BY AUTOMATED COUNT: 12.7 % (ref 11.9–14.6)
HCT VFR BLD AUTO: 37.8 % (ref 35.8–46.3)
HGB BLD-MCNC: 12.1 G/DL (ref 11.7–15.4)
IMM GRANULOCYTES # BLD AUTO: 0 K/UL (ref 0–0.5)
IMM GRANULOCYTES NFR BLD AUTO: 0 % (ref 0–5)
LYMPHOCYTES # BLD: 0.9 K/UL (ref 0.5–4.6)
LYMPHOCYTES NFR BLD: 17 % (ref 13–44)
MCH RBC QN AUTO: 30.2 PG (ref 26.1–32.9)
MCHC RBC AUTO-ENTMCNC: 32 G/DL (ref 31.4–35)
MCV RBC AUTO: 94.3 FL (ref 82–102)
MONOCYTES # BLD: 0.5 K/UL (ref 0.1–1.3)
MONOCYTES NFR BLD: 11 % (ref 4–12)
NEUTS SEG # BLD: 3.5 K/UL (ref 1.7–8.2)
NEUTS SEG NFR BLD: 67 % (ref 43–78)
NRBC # BLD: 0 K/UL (ref 0–0.2)
PLATELET # BLD AUTO: 177 K/UL (ref 150–450)
PMV BLD AUTO: 11.8 FL (ref 9.4–12.3)
RBC # BLD AUTO: 4.01 M/UL (ref 4.05–5.2)
WBC # BLD AUTO: 5.1 K/UL (ref 4.3–11.1)

## 2024-12-09 PROCEDURE — 3079F DIAST BP 80-89 MM HG: CPT | Performed by: INTERNAL MEDICINE

## 2024-12-09 PROCEDURE — 1123F ACP DISCUSS/DSCN MKR DOCD: CPT | Performed by: INTERNAL MEDICINE

## 2024-12-09 PROCEDURE — 3075F SYST BP GE 130 - 139MM HG: CPT | Performed by: INTERNAL MEDICINE

## 2024-12-09 PROCEDURE — 1160F RVW MEDS BY RX/DR IN RCRD: CPT | Performed by: INTERNAL MEDICINE

## 2024-12-09 PROCEDURE — G2211 COMPLEX E/M VISIT ADD ON: HCPCS | Performed by: INTERNAL MEDICINE

## 2024-12-09 PROCEDURE — 1159F MED LIST DOCD IN RCRD: CPT | Performed by: INTERNAL MEDICINE

## 2024-12-09 PROCEDURE — 99214 OFFICE O/P EST MOD 30 MIN: CPT | Performed by: INTERNAL MEDICINE

## 2024-12-09 RX ORDER — HYDROXYCHLOROQUINE SULFATE 200 MG/1
200 TABLET, FILM COATED ORAL DAILY
Qty: 90 TABLET | Refills: 1 | Status: SHIPPED | OUTPATIENT
Start: 2024-12-09

## 2024-12-09 RX ORDER — PHENOL 1.4 %
1 AEROSOL, SPRAY (ML) MUCOUS MEMBRANE DAILY
COMMUNITY

## 2024-12-09 RX ORDER — DEXTROAMPHETAMINE SACCHARATE, AMPHETAMINE ASPARTATE, DEXTROAMPHETAMINE SULFATE AND AMPHETAMINE SULFATE 7.5; 7.5; 7.5; 7.5 MG/1; MG/1; MG/1; MG/1
30 TABLET ORAL DAILY
COMMUNITY

## 2024-12-09 ASSESSMENT — ROUTINE ASSESSMENT OF PATIENT INDEX DATA (RAPID3)
ON A SCALE OF ONE TO TEN, HOW MUCH OF A PROBLEM HAS UNUSUAL FATIGUE OR TIREDNESS BEEN FOR YOU OVER THE PAST WEEK?: 3
ON A SCALE OF ONE TO TEN, HOW MUCH PAIN HAVE YOU HAD BECAUSE OF YOUR CONDITION OVER THE PAST WEEK?: 3
WHEN YOU AWAKENED IN THE MORNING OVER THE LAST WEEK, PLEASE INDICATE THE AMOUNT OF TIME IT TAKES UNTIL YOU ARE AS LIMBER AS YOU WILL BE FOR THE DAY: < 10 MIN
ON A SCALE OF ONE TO TEN, CONSIDERING ALL THE WAYS IN WHICH ILLNESS AND HEALTH CONDITIONS MAY AFFECT YOU AT THIS TIME, PLEASE INDICATE BELOW HOW YOU ARE DOING:: 2
ON A SCALE OF ONE TO TEN, HOW DIFFICULT WAS IT FOR YOU TO COMPLETE THE LISTED DAILY PHYSICAL TASKS OVER THE LAST WEEK: 0.4

## 2024-12-09 ASSESSMENT — JOINT PAIN
TOTAL NUMBER OF TENDER JOINTS: 2
TOTAL NUMBER OF SWOLLEN JOINTS: 0

## 2024-12-09 NOTE — PROGRESS NOTES
Healthcare System    Exercise Vital Sign     Days of Exercise per Week: 3 days     Minutes of Exercise per Session: 10 min   Stress: No Stress Concern Present (4/22/2024)    [facility]    [employer] [employer] [place] [employer] - Occupational Stress Questionnaire     Feeling of Stress : Only a little   Social Connections: Socially Integrated (4/22/2024)    [facility]    Social Connection and Isolation Panel [NHANES]     Frequency of Communication with Friends and Family: More than three times a week     Frequency of Social Gatherings with Friends and Family: More than three times a week     Attends Nondenominational Services: More than 4 times per year     Active Member of Clubs or Organizations: Yes     Attends Club or Organization Meetings: More than 4 times per year     Marital Status:    Intimate Partner Violence: Not At Risk (4/22/2024)    [facility]    Humiliation, Afraid, Rape, and Kick questionnaire     Fear of Current or Ex-Partner: No     Emotionally Abused: No     Physically Abused: No     Sexually Abused: No   Housing Stability: Unknown (4/22/2024)    [facility]    Housing Stability Vital Sign     Unable to Pay for Housing in the Last Year: No     Unstable Housing in the Last Year: No       Allergy:  Allergies   Allergen Reactions    Sulfa Antibiotics Other (See Comments)     Visual dusturbances    Causes extreme light sensitivity    Penicillins Rash and Hives    Omeprazole Diarrhea         Current Medications:  Outpatient Encounter Medications as of 12/9/2024   Medication Sig Dispense Refill    calcium carbonate 600 MG TABS tablet Take 1 tablet by mouth daily

## 2024-12-10 LAB
ALBUMIN SERPL-MCNC: 4 G/DL (ref 3.2–4.6)
ALBUMIN/GLOB SERPL: 1.4 (ref 1–1.9)
ALP SERPL-CCNC: 112 U/L (ref 35–104)
ALT SERPL-CCNC: 24 U/L (ref 8–45)
ANION GAP SERPL CALC-SCNC: 11 MMOL/L (ref 7–16)
AST SERPL-CCNC: 27 U/L (ref 15–37)
BILIRUB SERPL-MCNC: 0.3 MG/DL (ref 0–1.2)
BUN SERPL-MCNC: 19 MG/DL (ref 8–23)
CALCIUM SERPL-MCNC: 9.8 MG/DL (ref 8.8–10.2)
CHLORIDE SERPL-SCNC: 107 MMOL/L (ref 98–107)
CO2 SERPL-SCNC: 25 MMOL/L (ref 20–29)
CREAT SERPL-MCNC: 0.73 MG/DL (ref 0.6–1.1)
DSDNA AB SER-ACNC: 5 IU/ML (ref 0–9)
GLOBULIN SER CALC-MCNC: 2.8 G/DL (ref 2.3–3.5)
GLUCOSE SERPL-MCNC: 90 MG/DL (ref 70–99)
POTASSIUM SERPL-SCNC: 4 MMOL/L (ref 3.5–5.1)
PROT SERPL-MCNC: 6.7 G/DL (ref 6.3–8.2)
SODIUM SERPL-SCNC: 143 MMOL/L (ref 136–145)

## 2024-12-11 LAB
C3 SERPL-MCNC: 151 MG/DL (ref 82–167)
C4 SERPL-MCNC: 23 MG/DL (ref 12–38)

## 2025-01-31 ENCOUNTER — TELEPHONE (OUTPATIENT)
Dept: PULMONOLOGY | Age: 75
End: 2025-01-31

## 2025-01-31 DIAGNOSIS — J84.9 ILD (INTERSTITIAL LUNG DISEASE) (HCC): Primary | ICD-10-CM

## 2025-01-31 NOTE — TELEPHONE ENCOUNTER
CXR Order has been established and patient has been notified. // [facility]     no

## 2025-01-31 NOTE — TELEPHONE ENCOUNTER
Patient needs an order for CXR sent over. She wants to have it done at [facility] a couple days prior to appt

## 2025-02-13 ENCOUNTER — TELEPHONE (OUTPATIENT)
Dept: RHEUMATOLOGY | Age: 75
End: 2025-02-13

## 2025-02-13 NOTE — TELEPHONE ENCOUNTER
Eye note received 02/13/25 from [facility] eye [facility] 02/11/25 , it was put in Dr Flores's folder to be  reviewed and signed, then it will be sent to front office be scanned into the chart.

## 2025-04-15 ENCOUNTER — OFFICE VISIT (OUTPATIENT)
Dept: RHEUMATOLOGY | Age: 75
End: 2025-04-15
Payer: MEDICARE

## 2025-04-15 VITALS
WEIGHT: 159 LBS | BODY MASS INDEX: 25.55 KG/M2 | SYSTOLIC BLOOD PRESSURE: 130 MMHG | DIASTOLIC BLOOD PRESSURE: 66 MMHG | OXYGEN SATURATION: 99 % | HEIGHT: 66 IN | HEART RATE: 90 BPM

## 2025-04-15 DIAGNOSIS — I73.00 RAYNAUD'S DISEASE WITHOUT GANGRENE: ICD-10-CM

## 2025-04-15 DIAGNOSIS — M32.9 SYSTEMIC LUPUS ERYTHEMATOSUS, UNSPECIFIED SLE TYPE, UNSPECIFIED ORGAN INVOLVEMENT STATUS (HCC): Primary | ICD-10-CM

## 2025-04-15 DIAGNOSIS — M32.9 SYSTEMIC LUPUS ERYTHEMATOSUS, UNSPECIFIED SLE TYPE, UNSPECIFIED ORGAN INVOLVEMENT STATUS (HCC): ICD-10-CM

## 2025-04-15 DIAGNOSIS — Z79.899 LONG-TERM USE OF HIGH-RISK MEDICATION: ICD-10-CM

## 2025-04-15 LAB
ALBUMIN SERPL-MCNC: 4.1 G/DL (ref 3.2–4.6)
ALBUMIN/GLOB SERPL: 1.5 (ref 1–1.9)
ALP SERPL-CCNC: 113 U/L (ref 35–104)
ALT SERPL-CCNC: 28 U/L (ref 8–45)
ANION GAP SERPL CALC-SCNC: 12 MMOL/L (ref 7–16)
APPEARANCE UR: CLEAR
AST SERPL-CCNC: 32 U/L (ref 15–37)
BACTERIA URNS QL MICRO: NEGATIVE /HPF
BASOPHILS # BLD: 0.02 K/UL (ref 0–0.2)
BASOPHILS NFR BLD: 0.5 % (ref 0–2)
BILIRUB SERPL-MCNC: 0.5 MG/DL (ref 0–1.2)
BILIRUB UR QL: NEGATIVE
BUN SERPL-MCNC: 15 MG/DL (ref 8–23)
CALCIUM SERPL-MCNC: 9.8 MG/DL (ref 8.8–10.2)
CHLORIDE SERPL-SCNC: 106 MMOL/L (ref 98–107)
CO2 SERPL-SCNC: 26 MMOL/L (ref 20–29)
COLOR UR: NORMAL
CREAT SERPL-MCNC: 0.72 MG/DL (ref 0.6–1.1)
DIFFERENTIAL METHOD BLD: ABNORMAL
EOSINOPHIL # BLD: 0.26 K/UL (ref 0–0.8)
EOSINOPHIL NFR BLD: 6.4 % (ref 0.5–7.8)
EPI CELLS #/AREA URNS HPF: NORMAL /HPF (ref 0–5)
ERYTHROCYTE [DISTWIDTH] IN BLOOD BY AUTOMATED COUNT: 12.9 % (ref 11.9–14.6)
GLOBULIN SER CALC-MCNC: 2.8 G/DL (ref 2.3–3.5)
GLUCOSE SERPL-MCNC: 98 MG/DL (ref 70–99)
GLUCOSE UR STRIP.AUTO-MCNC: NEGATIVE MG/DL
HCT VFR BLD AUTO: 39.1 % (ref 35.8–46.3)
HGB BLD-MCNC: 12.7 G/DL (ref 11.7–15.4)
HGB UR QL STRIP: NEGATIVE
HYALINE CASTS URNS QL MICRO: NORMAL /LPF
IMM GRANULOCYTES # BLD AUTO: 0.01 K/UL (ref 0–0.5)
IMM GRANULOCYTES NFR BLD AUTO: 0.2 % (ref 0–5)
KETONES UR QL STRIP.AUTO: NEGATIVE MG/DL
LEUKOCYTE ESTERASE UR QL STRIP.AUTO: NEGATIVE
LYMPHOCYTES # BLD: 0.7 K/UL (ref 0.5–4.6)
LYMPHOCYTES NFR BLD: 17.2 % (ref 13–44)
MCH RBC QN AUTO: 29.7 PG (ref 26.1–32.9)
MCHC RBC AUTO-ENTMCNC: 32.5 G/DL (ref 31.4–35)
MCV RBC AUTO: 91.6 FL (ref 82–102)
MONOCYTES # BLD: 0.49 K/UL (ref 0.1–1.3)
MONOCYTES NFR BLD: 12 % (ref 4–12)
MUCOUS THREADS URNS QL MICRO: 0 /LPF
NEUTS SEG # BLD: 2.59 K/UL (ref 1.7–8.2)
NEUTS SEG NFR BLD: 63.7 % (ref 43–78)
NITRITE UR QL STRIP.AUTO: NEGATIVE
NRBC # BLD: 0 K/UL (ref 0–0.2)
PH UR STRIP: 6.5 (ref 5–9)
PLATELET # BLD AUTO: 130 K/UL (ref 150–450)
PMV BLD AUTO: 12.4 FL (ref 9.4–12.3)
POTASSIUM SERPL-SCNC: 4.2 MMOL/L (ref 3.5–5.1)
PROT SERPL-MCNC: 6.9 G/DL (ref 6.3–8.2)
PROT UR STRIP-MCNC: NEGATIVE MG/DL
RBC # BLD AUTO: 4.27 M/UL (ref 4.05–5.2)
RBC #/AREA URNS HPF: NORMAL /HPF (ref 0–5)
SODIUM SERPL-SCNC: 144 MMOL/L (ref 136–145)
SP GR UR REFRACTOMETRY: 1.02 (ref 1–1.02)
URINE CULTURE IF INDICATED: NORMAL
UROBILINOGEN UR QL STRIP.AUTO: 1 EU/DL (ref 0.2–1)
WBC # BLD AUTO: 4.1 K/UL (ref 4.3–11.1)
WBC URNS QL MICRO: NORMAL /HPF (ref 0–4)

## 2025-04-15 PROCEDURE — 3078F DIAST BP <80 MM HG: CPT | Performed by: INTERNAL MEDICINE

## 2025-04-15 PROCEDURE — 3075F SYST BP GE 130 - 139MM HG: CPT | Performed by: INTERNAL MEDICINE

## 2025-04-15 PROCEDURE — 1123F ACP DISCUSS/DSCN MKR DOCD: CPT | Performed by: INTERNAL MEDICINE

## 2025-04-15 PROCEDURE — 99214 OFFICE O/P EST MOD 30 MIN: CPT | Performed by: INTERNAL MEDICINE

## 2025-04-15 PROCEDURE — 1160F RVW MEDS BY RX/DR IN RCRD: CPT | Performed by: INTERNAL MEDICINE

## 2025-04-15 PROCEDURE — 1159F MED LIST DOCD IN RCRD: CPT | Performed by: INTERNAL MEDICINE

## 2025-04-15 PROCEDURE — G2211 COMPLEX E/M VISIT ADD ON: HCPCS | Performed by: INTERNAL MEDICINE

## 2025-04-15 RX ORDER — HYDROXYCHLOROQUINE SULFATE 200 MG/1
200 TABLET, FILM COATED ORAL DAILY
Qty: 90 TABLET | Refills: 1 | Status: SHIPPED | OUTPATIENT
Start: 2025-04-15

## 2025-04-15 ASSESSMENT — ROUTINE ASSESSMENT OF PATIENT INDEX DATA (RAPID3)
ON A SCALE OF ONE TO TEN, HOW MUCH OF A PROBLEM HAS UNUSUAL FATIGUE OR TIREDNESS BEEN FOR YOU OVER THE PAST WEEK?: 3
WHEN YOU AWAKENED IN THE MORNING OVER THE LAST WEEK, PLEASE INDICATE THE AMOUNT OF TIME IT TAKES UNTIL YOU ARE AS LIMBER AS YOU WILL BE FOR THE DAY: < 10 MIN
ON A SCALE OF ONE TO TEN, CONSIDERING ALL THE WAYS IN WHICH ILLNESS AND HEALTH CONDITIONS MAY AFFECT YOU AT THIS TIME, PLEASE INDICATE BELOW HOW YOU ARE DOING:: 3
ON A SCALE OF ONE TO TEN, HOW DIFFICULT WAS IT FOR YOU TO COMPLETE THE LISTED DAILY PHYSICAL TASKS OVER THE LAST WEEK: 0.4
ON A SCALE OF ONE TO TEN, HOW MUCH PAIN HAVE YOU HAD BECAUSE OF YOUR CONDITION OVER THE PAST WEEK?: 2

## 2025-04-15 ASSESSMENT — JOINT PAIN
TOTAL NUMBER OF TENDER JOINTS: 4
TOTAL NUMBER OF SWOLLEN JOINTS: 2

## 2025-04-15 NOTE — PROGRESS NOTES
[facility]  Apolonia Flores M.D.  [address] , [address]   [facility]  Office : [phone], Fax: [phone]     RHEUMATOLOGY OFFICE VISIT NOTE  Date of Visit:  4/15/2025 10:41 AM    Patient Information:  Name:  Ileana Unger  :  1950  Age:  74 y.o.   Gender:  female      Ms. Unger is here today for follow-up of systemic lupus erythematosus,   raynaud's and medication monitoring.     Last visit: 24    History of Present Illness: On talking to the patient today she states that she has had some PND with a runny nose with no congestion or sinus headaches.  Patient did have an eye exam on 25 at [facility], which showed no evidence of Plaquenil toxicity.  Patient's current joint complaints are minimal as mentioned below.    Since the last visit, patient is feeling \"very good\".    Pain: 2/10  Location: Some bilateral knee pain and swelling with occasional swelling of the ankles. Some lower back pain. Some neck stiffness with no pain with neck ROM. No tension headaches with no para spinal muscle pain. Bilateral wrist pain with no swelling, warmth and redness. Bilateral thumb pain with no swelling, warmth and redness.   Quality:  Sharp pain in the knee.   Modifying Factors:  End of the day the pain is the worst.   Associated Symptoms:  No limitations with her ADL's. Intermittent pain with tingling and numbness from the right hip to the knee and occasionally to the foot. Intermittent pain with no tingling and numbness from the left hip to the left mid thigh. Some right LE weakness. No limitations with her ADL's.         4/15/2025    10:00 AM   DMARD/Biologic   AM Stiffness < 10 min   Pain 2   Fatigue 3   MDHAQ 0.4   Patient Global Score 3   Medication Name Plaquenil     Last TB screen:    TB result: Skin (+); X-Ray (-)       Current dose of steroids: None  How long on current dose of steroids: NA  How long on continuous steroid therapy: NA

## 2025-04-16 LAB — DSDNA AB SER-ACNC: 6 IU/ML (ref 0–9)

## 2025-04-17 LAB
C3 SERPL-MCNC: 149 MG/DL (ref 82–167)
C4 SERPL-MCNC: 24 MG/DL (ref 12–38)

## 2025-06-26 ENCOUNTER — HOSPITAL ENCOUNTER (OUTPATIENT)
Dept: GENERAL RADIOLOGY | Age: 75
Discharge: HOME | End: 2025-06-29
Payer: MEDICARE

## 2025-06-26 DIAGNOSIS — J84.9 ILD (INTERSTITIAL LUNG DISEASE) (HCC): ICD-10-CM

## 2025-06-26 PROCEDURE — 71046 X-RAY EXAM CHEST 2 VIEWS: CPT

## 2025-07-01 ENCOUNTER — OFFICE VISIT (OUTPATIENT)
Dept: PULMONOLOGY | Age: 75
End: 2025-07-01
Payer: MEDICARE

## 2025-07-01 VITALS
RESPIRATION RATE: 20 BRPM | BODY MASS INDEX: 25.43 KG/M2 | TEMPERATURE: 98 F | DIASTOLIC BLOOD PRESSURE: 70 MMHG | HEIGHT: 67 IN | HEART RATE: 82 BPM | OXYGEN SATURATION: 99 % | WEIGHT: 162 LBS | SYSTOLIC BLOOD PRESSURE: 130 MMHG

## 2025-07-01 DIAGNOSIS — M32.9 SLE (SYSTEMIC LUPUS ERYTHEMATOSUS RELATED SYNDROME) (HCC): ICD-10-CM

## 2025-07-01 DIAGNOSIS — J84.9 ILD (INTERSTITIAL LUNG DISEASE) (HCC): Primary | ICD-10-CM

## 2025-07-01 DIAGNOSIS — K21.9 GASTROESOPHAGEAL REFLUX DISEASE WITHOUT ESOPHAGITIS: ICD-10-CM

## 2025-07-01 LAB
EXPIRATORY TIME: NORMAL
FEF 25-75% %PRED-PRE: NORMAL
FEF 25-75% PRED: NORMAL
FEF 25-75-PRE: NORMAL
FEV1 %PRED-PRE: 99 %
FEV1 PRED: NORMAL
FEV1/FVC %PRED-PRE: NORMAL
FEV1/FVC PRED: NORMAL
FEV1: 2.21 L
FVC %PRED-PRE: 91 %
FVC PRED: NORMAL
FVC: 2.67 L
PEF %PRED-PRE: NORMAL
PEF PRED: NORMAL
PEF-PRE: NORMAL

## 2025-07-01 PROCEDURE — G2211 COMPLEX E/M VISIT ADD ON: HCPCS | Performed by: INTERNAL MEDICINE

## 2025-07-01 PROCEDURE — 1159F MED LIST DOCD IN RCRD: CPT | Performed by: INTERNAL MEDICINE

## 2025-07-01 PROCEDURE — 94010 BREATHING CAPACITY TEST: CPT | Performed by: INTERNAL MEDICINE

## 2025-07-01 PROCEDURE — 99214 OFFICE O/P EST MOD 30 MIN: CPT | Performed by: INTERNAL MEDICINE

## 2025-07-01 PROCEDURE — 1123F ACP DISCUSS/DSCN MKR DOCD: CPT | Performed by: INTERNAL MEDICINE

## 2025-07-01 PROCEDURE — 1160F RVW MEDS BY RX/DR IN RCRD: CPT | Performed by: INTERNAL MEDICINE

## 2025-07-01 PROCEDURE — 3075F SYST BP GE 130 - 139MM HG: CPT | Performed by: INTERNAL MEDICINE

## 2025-07-01 PROCEDURE — 3078F DIAST BP <80 MM HG: CPT | Performed by: INTERNAL MEDICINE

## 2025-07-01 ASSESSMENT — PULMONARY FUNCTION TESTS
FEV1: 2.21
FEV1_PERCENT_PREDICTED_PRE: 99
FVC_PERCENT_PREDICTED_PRE: 91
FVC: 2.67

## 2025-07-01 NOTE — PROGRESS NOTES
Patient Name:  Ileana Unger                             YOB: 1950  MRN: [id number]                                              Office Visit 7/1/2025    ASSESSMENT AND PLAN:  (Medical Decision Making)    Pt with a history of ILD related to SLE. Fortunately has remained mild over many years and has not required therapy, just monitoring.   Now, however, reporting about 4 months of noticeable worsening RESTREPO. No other associated symptoms.   CXR and in office spirometry is stable.     -will repeat CT and cPFT's now.   -if they confirm progressive disease would want to confer with her rheumatologist about additional agent such as cellcept.   -GERD is also poorly controlled. On omeprazole 20mg daily. Will increase to bid.   F/u in 6 months.       Ileana was seen today for ild (interstitial lung disease) .    Diagnoses and all orders for this visit:    ILD (interstitial lung disease) ([facility])  -     Spirometry Without Bronchodilator          Jorge Ch MD    _________________________________________________________________________    HISTORY OF PRESENT ILLNESS:    Ms. Ileana Unger in our clinic today who presents with ILD (interstitial lung disease)    She has a history of ADD, KINGA, ILD (UIP pattern on CT) felt to be related to SLE.   So far disease has been stable without specific therapy.     Returns today for follow up appointment.   At her last appt the plan was to repeat PFT's and CXR with follow up in 1 year.   CXR 7/11/23 was stable.   Spirometry was performed today which showed stable findings.     She states she is to have a hip replacement Aug 10. Dr Nelson at [facility].   She denies worsening shortness of breath, cough, or wheeze.   Sometimes cough related to acid reflux.   She is on omeprazole which controls it pretty well.     Just on plaquenil for SLE. No flares of symptoms. Well controlled.     At her last appt the plan was to continue monitoring:  -spirometry and cxr at next

## 2025-08-15 ENCOUNTER — OFFICE VISIT (OUTPATIENT)
Dept: RHEUMATOLOGY | Age: 75
End: 2025-08-15
Payer: MEDICARE

## 2025-08-15 VITALS
OXYGEN SATURATION: 99 % | DIASTOLIC BLOOD PRESSURE: 66 MMHG | SYSTOLIC BLOOD PRESSURE: 128 MMHG | WEIGHT: 157 LBS | BODY MASS INDEX: 24.64 KG/M2 | HEART RATE: 87 BPM | HEIGHT: 67 IN

## 2025-08-15 DIAGNOSIS — M32.9 SYSTEMIC LUPUS ERYTHEMATOSUS, UNSPECIFIED SLE TYPE, UNSPECIFIED ORGAN INVOLVEMENT STATUS (HCC): ICD-10-CM

## 2025-08-15 DIAGNOSIS — Z79.899 LONG-TERM USE OF HIGH-RISK MEDICATION: ICD-10-CM

## 2025-08-15 DIAGNOSIS — M32.9 SYSTEMIC LUPUS ERYTHEMATOSUS, UNSPECIFIED SLE TYPE, UNSPECIFIED ORGAN INVOLVEMENT STATUS (HCC): Primary | ICD-10-CM

## 2025-08-15 DIAGNOSIS — I73.00 RAYNAUD'S DISEASE WITHOUT GANGRENE: ICD-10-CM

## 2025-08-15 LAB
ALBUMIN SERPL-MCNC: 4 G/DL (ref 3.2–4.6)
ALBUMIN/GLOB SERPL: 1.3 (ref 1–1.9)
ALP SERPL-CCNC: 110 U/L (ref 35–104)
ALT SERPL-CCNC: 24 U/L (ref 8–45)
ANION GAP SERPL CALC-SCNC: 10 MMOL/L (ref 7–16)
AST SERPL-CCNC: 26 U/L (ref 15–37)
BASOPHILS # BLD: 0.03 K/UL (ref 0–0.2)
BASOPHILS NFR BLD: 0.7 % (ref 0–2)
BILIRUB SERPL-MCNC: 0.5 MG/DL (ref 0–1.2)
BUN SERPL-MCNC: 16 MG/DL (ref 8–23)
CALCIUM SERPL-MCNC: 10.1 MG/DL (ref 8.8–10.2)
CHLORIDE SERPL-SCNC: 106 MMOL/L (ref 98–107)
CO2 SERPL-SCNC: 27 MMOL/L (ref 20–29)
CREAT SERPL-MCNC: 0.79 MG/DL (ref 0.6–1.1)
DIFFERENTIAL METHOD BLD: ABNORMAL
EOSINOPHIL # BLD: 0.28 K/UL (ref 0–0.8)
EOSINOPHIL NFR BLD: 6.1 % (ref 0.5–7.8)
ERYTHROCYTE [DISTWIDTH] IN BLOOD BY AUTOMATED COUNT: 13 % (ref 11.9–14.6)
GLOBULIN SER CALC-MCNC: 3.1 G/DL (ref 2.3–3.5)
GLUCOSE SERPL-MCNC: 104 MG/DL (ref 70–99)
HCT VFR BLD AUTO: 39.9 % (ref 35.8–46.3)
HGB BLD-MCNC: 12.6 G/DL (ref 11.7–15.4)
IMM GRANULOCYTES # BLD AUTO: 0.02 K/UL (ref 0–0.5)
IMM GRANULOCYTES NFR BLD AUTO: 0.4 % (ref 0–5)
LYMPHOCYTES # BLD: 0.89 K/UL (ref 0.5–4.6)
LYMPHOCYTES NFR BLD: 19.4 % (ref 13–44)
MCH RBC QN AUTO: 30.2 PG (ref 26.1–32.9)
MCHC RBC AUTO-ENTMCNC: 31.6 G/DL (ref 31.4–35)
MCV RBC AUTO: 95.7 FL (ref 82–102)
MONOCYTES # BLD: 0.5 K/UL (ref 0.1–1.3)
MONOCYTES NFR BLD: 10.9 % (ref 4–12)
NEUTS SEG # BLD: 2.87 K/UL (ref 1.7–8.2)
NEUTS SEG NFR BLD: 62.5 % (ref 43–78)
NRBC # BLD: 0 K/UL (ref 0–0.2)
PLATELET # BLD AUTO: 134 K/UL (ref 150–450)
PMV BLD AUTO: 12 FL (ref 9.4–12.3)
POTASSIUM SERPL-SCNC: 4.4 MMOL/L (ref 3.5–5.1)
PROT SERPL-MCNC: 7.1 G/DL (ref 6.3–8.2)
RBC # BLD AUTO: 4.17 M/UL (ref 4.05–5.2)
SODIUM SERPL-SCNC: 143 MMOL/L (ref 136–145)
WBC # BLD AUTO: 4.6 K/UL (ref 4.3–11.1)

## 2025-08-15 PROCEDURE — 1160F RVW MEDS BY RX/DR IN RCRD: CPT | Performed by: INTERNAL MEDICINE

## 2025-08-15 PROCEDURE — 3074F SYST BP LT 130 MM HG: CPT | Performed by: INTERNAL MEDICINE

## 2025-08-15 PROCEDURE — 3078F DIAST BP <80 MM HG: CPT | Performed by: INTERNAL MEDICINE

## 2025-08-15 PROCEDURE — 1159F MED LIST DOCD IN RCRD: CPT | Performed by: INTERNAL MEDICINE

## 2025-08-15 PROCEDURE — 99214 OFFICE O/P EST MOD 30 MIN: CPT | Performed by: INTERNAL MEDICINE

## 2025-08-15 PROCEDURE — 1123F ACP DISCUSS/DSCN MKR DOCD: CPT | Performed by: INTERNAL MEDICINE

## 2025-08-15 PROCEDURE — G2211 COMPLEX E/M VISIT ADD ON: HCPCS | Performed by: INTERNAL MEDICINE

## 2025-08-15 RX ORDER — HYDROXYCHLOROQUINE SULFATE 200 MG/1
200 TABLET, FILM COATED ORAL DAILY
Qty: 90 TABLET | Refills: 1 | Status: SHIPPED | OUTPATIENT
Start: 2025-08-15

## 2025-08-15 ASSESSMENT — ROUTINE ASSESSMENT OF PATIENT INDEX DATA (RAPID3)
WHEN YOU AWAKENED IN THE MORNING OVER THE LAST WEEK, PLEASE INDICATE THE AMOUNT OF TIME IT TAKES UNTIL YOU ARE AS LIMBER AS YOU WILL BE FOR THE DAY: < 10 MIN
ON A SCALE OF ONE TO TEN, HOW MUCH PAIN HAVE YOU HAD BECAUSE OF YOUR CONDITION OVER THE PAST WEEK?: 2
ON A SCALE OF ONE TO TEN, HOW MUCH OF A PROBLEM HAS UNUSUAL FATIGUE OR TIREDNESS BEEN FOR YOU OVER THE PAST WEEK?: 2
ON A SCALE OF ONE TO TEN, CONSIDERING ALL THE WAYS IN WHICH ILLNESS AND HEALTH CONDITIONS MAY AFFECT YOU AT THIS TIME, PLEASE INDICATE BELOW HOW YOU ARE DOING:: 3
ON A SCALE OF ONE TO TEN, HOW DIFFICULT WAS IT FOR YOU TO COMPLETE THE LISTED DAILY PHYSICAL TASKS OVER THE LAST WEEK: 0.6

## 2025-08-15 ASSESSMENT — JOINT PAIN
TOTAL NUMBER OF TENDER JOINTS: 2
TOTAL NUMBER OF SWOLLEN JOINTS: 1

## 2025-08-16 LAB — DSDNA AB SER-ACNC: 7 IU/ML (ref 0–9)

## 2025-08-17 LAB
C3 SERPL-MCNC: 181 MG/DL (ref 82–167)
C4 SERPL-MCNC: 27 MG/DL (ref 12–38)

## 2025-08-29 ENCOUNTER — CLINICAL SUPPORT (OUTPATIENT)
Dept: PULMONOLOGY | Age: 75
End: 2025-08-29

## 2025-08-29 DIAGNOSIS — J84.9 ILD (INTERSTITIAL LUNG DISEASE) (HCC): Primary | ICD-10-CM

## 2025-08-29 LAB
FEF25-27, POC: 2.13 L/S
FET, POC: NORMAL
FEV 1 , POC: 2.14 L
FEV1/FVC, POC: 80
FVC, POC: 2.66
LUNG AGE, POC: NORMAL
PEF, POC: 4.65 L/S

## 2025-08-29 ASSESSMENT — PULMONARY FUNCTION TESTS
FEV1/FVC_POC: 80
FVC_POC: 2.66

## (undated) DEVICE — NEEDLE SPNL 22GA L3.5IN BLK HUB S STL REG WALL FIT STYL W/

## (undated) DEVICE — SYRINGE CATH TIP 50ML

## (undated) DEVICE — ZIP 16 SURGICAL SKIN CLOSURE DEVICE: Brand: ZIP 16 SURGICAL SKIN CLOSURE DEVICE

## (undated) DEVICE — BNDG COHESIVE 4INX5YD COBAN --

## (undated) DEVICE — REM POLYHESIVE ADULT PATIENT RETURN ELECTRODE: Brand: VALLEYLAB

## (undated) DEVICE — COVER,MAYO STAND,XL,STERILE: Brand: MEDLINE

## (undated) DEVICE — SUTURE VCRL SZ 2-0 L27IN ABSRB UD L36MM CP-1 1/2 CIR REV J266H

## (undated) DEVICE — SOLUTION IRRIG 3000ML 0.9% SOD CHL FLX CONT 0797208] ICU MEDICAL INC]

## (undated) DEVICE — SOLUTION IRRIG 1000ML 0.9% SOD CHL USP POUR PLAS BTL

## (undated) DEVICE — T4 HOOD

## (undated) DEVICE — SOLUTION IRRIG 3000ML 0.9% SOD CHL USP UROMATIC PLAS CONT

## (undated) DEVICE — SUTURE ETHBND EXCEL SZ 5 L30IN NONABSORBABLE GRN L40MM V-37 MB66G

## (undated) DEVICE — SOLUTION IRRIG 1000ML STRL H2O USP PLAS POUR BTL

## (undated) DEVICE — TRAY PREP DRY W/ PREM GLV 2 APPL 6 SPNG 2 UNDPD 1 OVERWRAP

## (undated) DEVICE — SUTURE STRATAFIX SPRL SZ 1 L14IN ABSRB VLT L48CM CTX 1/2 SXPD2B405

## (undated) DEVICE — SUTURE VCRL SZ 1 L36IN ABSRB UD CTX L48MM 1/2 CIR J977H

## (undated) DEVICE — YANKAUER,FLEXIBLE HANDLE,REGLR CAPACITY: Brand: MEDLINE INDUSTRIES, INC.

## (undated) DEVICE — SUTURE PDS II SZ 1 L96IN ABSRB VLT TP-1 L65MM 1/2 CIR Z880G

## (undated) DEVICE — SYR LR LCK 1ML GRAD NSAF 30ML --

## (undated) DEVICE — SYRINGE MED 50ML LUERLOCK TIP

## (undated) DEVICE — CLOSURE SKIN FACILITATES COMPATIBILITY W/ CERTAIN IS DSG

## (undated) DEVICE — BIPOLAR SEALER 23-112-1 AQM 6.0: Brand: AQUAMANTYS ®

## (undated) DEVICE — SINGLE PORT MANIFOLD: Brand: NEPTUNE 2

## (undated) DEVICE — LUBE JELLY FOIL PACK 1.4 OZ: Brand: MEDLINE INDUSTRIES, INC.

## (undated) DEVICE — DRESSING HYDROFIBER AQUACEL AG ADVANTAGE 3.5X10 IN

## (undated) DEVICE — STRYKER PERFORMANCE SERIES SAGITTAL BLADE: Brand: STRYKER PERFORMANCE SERIES

## (undated) DEVICE — CANNULA NSL ORAL AD FOR CAPNOFLEX CO2 O2 AIRLFE

## (undated) DEVICE — SOLUTION IV 1000ML 0.9% SOD CHL

## (undated) DEVICE — DRSG AQUACEL SURG 3.5 X 10IN -- CONVERT TO ITEM 370183

## (undated) DEVICE — SYRINGE MED 10ML LUERLOCK TIP W/O SFTY DISP

## (undated) DEVICE — HANDPIECE SET WITH COAXIAL HIGH FLOW TIP AND SUCTION TUBE: Brand: INTERPULSE

## (undated) DEVICE — KENDALL RADIOLUCENT FOAM MONITORING ELECTRODE RECTANGULAR SHAPE: Brand: KENDALL

## (undated) DEVICE — NEEDLE SYR 18GA L1.5IN RED PLAS HUB S STL BLNT FILL W/O

## (undated) DEVICE — TOTAL HIP DR RIDGEWAY: Brand: MEDLINE INDUSTRIES, INC.

## (undated) DEVICE — SYRINGE MED 3ML CLR PLAS STD N CTRL LUERLOCK TIP DISP

## (undated) DEVICE — (D)PREP SKN CHLRAPRP APPL 26ML -- CONVERT TO ITEM 371833

## (undated) DEVICE — AIRLIFE™ OXYGEN TUBING 7 FEET (2.1 M) CRUSH RESISTANT OXYGEN TUBING, VINYL TIPPED: Brand: AIRLIFE™

## (undated) DEVICE — SUTURE VCRL SZ 1 L27IN ABSRB UD L36MM CP-1 1/2 CIR REV CUT J268H

## (undated) DEVICE — GAUZE,SPONGE,4"X4",12PLY,WOVEN,NS,LF: Brand: MEDLINE

## (undated) DEVICE — 3M™ IOBAN™ 2 ANTIMICROBIAL INCISE DRAPE 6651EZ: Brand: IOBAN™ 2

## (undated) DEVICE — NEEDLE,18GX1.5",REG,BEVEL: Brand: MEDLINE

## (undated) DEVICE — YANKAUER,BULB TIP,W/O VENT,RIGID,STERILE: Brand: MEDLINE

## (undated) DEVICE — STERILE PVP: Brand: MEDLINE INDUSTRIES, INC.

## (undated) DEVICE — SYR 50ML LR LCK 1ML GRAD NSAF --

## (undated) DEVICE — SOLUTION IV 250ML 0.9% SOD CHL PH 5 INJ USP VIAFLX PLAS

## (undated) DEVICE — 3M™ STERI-DRAPE™ INSTRUMENT POUCH 1018: Brand: STERI-DRAPE™

## (undated) DEVICE — STRAP RESTRAIN W3.5XL19IN TECLIN STRRP POS LEG DURING LITH

## (undated) DEVICE — THE TORRENT IRRIGATION TUBING IS INTENDED TO PROVIDE IRRIGATION VIA IRRIGATION FLUIDS, SUCH AS STERILE WATER, DURING GASTROINTESTINAL ENDOSCOPIC PROCEDURES WHEN USED IN CONJUNCTION WITH AN IRRIGATION PUMP OR ELECTROSURGICAL UNIT.: Brand: TORRENT

## (undated) DEVICE — CONNECTOR TBNG OD5-7MM O2 END DISP

## (undated) DEVICE — SOLUTION IV 500ML 0.9% SOD CHL FLX CONT